# Patient Record
Sex: MALE | Race: WHITE | NOT HISPANIC OR LATINO | Employment: OTHER | URBAN - METROPOLITAN AREA
[De-identification: names, ages, dates, MRNs, and addresses within clinical notes are randomized per-mention and may not be internally consistent; named-entity substitution may affect disease eponyms.]

---

## 2023-10-26 ENCOUNTER — HOSPITAL ENCOUNTER (EMERGENCY)
Facility: HOSPITAL | Age: 83
Discharge: HOME | End: 2023-10-26
Attending: EMERGENCY MEDICINE
Payer: MEDICARE

## 2023-10-26 ENCOUNTER — APPOINTMENT (OUTPATIENT)
Dept: RADIOLOGY | Facility: HOSPITAL | Age: 83
End: 2023-10-26
Payer: MEDICARE

## 2023-10-26 VITALS
SYSTOLIC BLOOD PRESSURE: 148 MMHG | HEIGHT: 62 IN | TEMPERATURE: 98.1 F | OXYGEN SATURATION: 97 % | RESPIRATION RATE: 14 BRPM | BODY MASS INDEX: 24.84 KG/M2 | DIASTOLIC BLOOD PRESSURE: 76 MMHG | WEIGHT: 135 LBS | HEART RATE: 62 BPM

## 2023-10-26 DIAGNOSIS — F10.920 ALCOHOLIC INTOXICATION WITHOUT COMPLICATION (CMS-HCC): ICD-10-CM

## 2023-10-26 DIAGNOSIS — S09.90XA CLOSED HEAD INJURY, INITIAL ENCOUNTER: ICD-10-CM
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
## 2023-10-26 LAB
ALBUMIN SERPL BCP-MCNC: 4 G/DL (ref 3.4–5)
ALP SERPL-CCNC: 57 U/L (ref 33–136)
ALT SERPL W P-5'-P-CCNC: 20 U/L (ref 10–52)
ANION GAP SERPL CALC-SCNC: 13 MMOL/L (ref 10–20)
AST SERPL W P-5'-P-CCNC: 24 U/L (ref 9–39)
BASOPHILS # BLD AUTO: 0.03 X10*3/UL (ref 0–0.1)
BASOPHILS NFR BLD AUTO: 0.6 %
BILIRUB SERPL-MCNC: 0.4 MG/DL (ref 0–1.2)
BUN SERPL-MCNC: 12 MG/DL (ref 6–23)
CALCIUM SERPL-MCNC: 9.5 MG/DL (ref 8.6–10.3)
CHLORIDE SERPL-SCNC: 104 MMOL/L (ref 98–107)
CO2 SERPL-SCNC: 27 MMOL/L (ref 21–32)
CREAT SERPL-MCNC: 1.27 MG/DL (ref 0.5–1.3)
EOSINOPHIL # BLD AUTO: 0.32 X10*3/UL (ref 0–0.4)
EOSINOPHIL NFR BLD AUTO: 6.4 %
ERYTHROCYTE [DISTWIDTH] IN BLOOD BY AUTOMATED COUNT: 13.9 % (ref 11.5–14.5)
ETHANOL SERPL-MCNC: 171 MG/DL
GFR SERPL CREATININE-BSD FRML MDRD: 55 ML/MIN/1.73M*2
GLUCOSE SERPL-MCNC: 85 MG/DL (ref 74–99)
HCT VFR BLD AUTO: 47 % (ref 41–52)
HGB BLD-MCNC: 14.8 G/DL (ref 13.5–17.5)
IMM GRANULOCYTES # BLD AUTO: 0.03 X10*3/UL (ref 0–0.5)
IMM GRANULOCYTES NFR BLD AUTO: 0.6 % (ref 0–0.9)
LYMPHOCYTES # BLD AUTO: 1.48 X10*3/UL (ref 0.8–3)
LYMPHOCYTES NFR BLD AUTO: 29.8 %
MCH RBC QN AUTO: 29.3 PG (ref 26–34)
MCHC RBC AUTO-ENTMCNC: 31.5 G/DL (ref 32–36)
MCV RBC AUTO: 93 FL (ref 80–100)
MONOCYTES # BLD AUTO: 0.42 X10*3/UL (ref 0.05–0.8)
MONOCYTES NFR BLD AUTO: 8.5 %
NEUTROPHILS # BLD AUTO: 2.69 X10*3/UL (ref 1.6–5.5)
NEUTROPHILS NFR BLD AUTO: 54.1 %
NRBC BLD-RTO: 0 /100 WBCS (ref 0–0)
PLATELET # BLD AUTO: 221 X10*3/UL (ref 150–450)
PMV BLD AUTO: 10.6 FL (ref 7.5–11.5)
POTASSIUM SERPL-SCNC: 3.7 MMOL/L (ref 3.5–5.3)
PROT SERPL-MCNC: 7 G/DL (ref 6.4–8.2)
RBC # BLD AUTO: 5.05 X10*6/UL (ref 4.5–5.9)
SODIUM SERPL-SCNC: 140 MMOL/L (ref 136–145)
WBC # BLD AUTO: 5 X10*3/UL (ref 4.4–11.3)

## 2023-10-26 PROCEDURE — 72125 CT NECK SPINE W/O DYE: CPT | Performed by: RADIOLOGY

## 2023-10-26 PROCEDURE — 70450 CT HEAD/BRAIN W/O DYE: CPT | Performed by: RADIOLOGY

## 2023-10-26 PROCEDURE — 71045 X-RAY EXAM CHEST 1 VIEW: CPT | Mod: FOREIGN READ | Performed by: RADIOLOGY

## 2023-10-26 PROCEDURE — 36415 COLL VENOUS BLD VENIPUNCTURE: CPT | Performed by: EMERGENCY MEDICINE

## 2023-10-26 PROCEDURE — 70450 CT HEAD/BRAIN W/O DYE: CPT | Mod: ME

## 2023-10-26 PROCEDURE — 99285 EMERGENCY DEPT VISIT HI MDM: CPT | Mod: 25 | Performed by: EMERGENCY MEDICINE

## 2023-10-26 PROCEDURE — 80053 COMPREHEN METABOLIC PANEL: CPT | Performed by: EMERGENCY MEDICINE

## 2023-10-26 PROCEDURE — 82077 ASSAY SPEC XCP UR&BREATH IA: CPT | Performed by: EMERGENCY MEDICINE

## 2023-10-26 PROCEDURE — 72125 CT NECK SPINE W/O DYE: CPT | Mod: ME

## 2023-10-26 PROCEDURE — 85025 COMPLETE CBC W/AUTO DIFF WBC: CPT | Performed by: EMERGENCY MEDICINE

## 2023-10-26 PROCEDURE — 71045 X-RAY EXAM CHEST 1 VIEW: CPT | Mod: FY

## 2023-10-26 ASSESSMENT — COLUMBIA-SUICIDE SEVERITY RATING SCALE - C-SSRS
2. HAVE YOU ACTUALLY HAD ANY THOUGHTS OF KILLING YOURSELF?: NO
1. IN THE PAST MONTH, HAVE YOU WISHED YOU WERE DEAD OR WISHED YOU COULD GO TO SLEEP AND NOT WAKE UP?: NO
6. HAVE YOU EVER DONE ANYTHING, STARTED TO DO ANYTHING, OR PREPARED TO DO ANYTHING TO END YOUR LIFE?: NO

## 2023-10-26 ASSESSMENT — PAIN SCALES - GENERAL
PAINLEVEL_OUTOF10: 0 - NO PAIN

## 2023-10-26 ASSESSMENT — PAIN - FUNCTIONAL ASSESSMENT: PAIN_FUNCTIONAL_ASSESSMENT: 0-10

## 2023-10-26 NOTE — ED PROVIDER NOTES
"HPI   Chief Complaint   Patient presents with    Fall     Per family, pt \"lost his balance getting pants on\" and fell and hit the back of his head. Denies LOC, no blood thinners. Pt denies any head pain. Family states pt is normally alert and oriented, pt is alert to himself and the location. Pt states he has been drinking today, unknown amount.        83-year-old male who presents by squad for a fall.  Patient states he fell getting up today putting on his pants fell hitting his head.  He is not on any blood thinners.  Patient admits to doing 2 shots of Mauro Beam today.  He denies any other injuries in his neck or back.  Denies any pelvic pain.  Family states that he was hard to arouse initially.  He does live alone.                          No data recorded                Patient History   Past Medical History:   Diagnosis Date    Personal history of other diseases of the respiratory system 09/28/2016    History of acute bronchitis    Personal history of other endocrine, nutritional and metabolic disease 01/13/2017    History of hyperlipidemia    Unspecified asthma with (acute) exacerbation 08/15/2018    Asthma exacerbation    Vitamin D deficiency, unspecified 02/11/2022    Vitamin D deficiency     No past surgical history on file.  No family history on file.  Social History     Tobacco Use    Smoking status: Not on file    Smokeless tobacco: Not on file   Substance Use Topics    Alcohol use: Not on file    Drug use: Not on file       Physical Exam   ED Triage Vitals [10/26/23 1900]   Temp Heart Rate Resp BP   36.7 °C (98.1 °F) 78 15 (!) 179/105      SpO2 Temp Source Heart Rate Source Patient Position   98 % Temporal Monitor --      BP Location FiO2 (%)     -- --       Physical Exam  Constitutional:       Appearance: Normal appearance. He is normal weight.   HENT:      Head: Normocephalic and atraumatic.      Nose: Nose normal.      Mouth/Throat:      Mouth: Mucous membranes are moist.      Pharynx: Oropharynx is " clear.   Eyes:      Extraocular Movements: Extraocular movements intact.      Conjunctiva/sclera: Conjunctivae normal.      Pupils: Pupils are equal, round, and reactive to light.   Cardiovascular:      Rate and Rhythm: Normal rate and regular rhythm.   Pulmonary:      Effort: Pulmonary effort is normal.      Breath sounds: Normal breath sounds.   Abdominal:      General: Abdomen is flat. Bowel sounds are normal.      Palpations: Abdomen is soft.   Musculoskeletal:         General: Normal range of motion.      Cervical back: Normal range of motion and neck supple.   Skin:     General: Skin is warm and dry.      Capillary Refill: Capillary refill takes less than 2 seconds.   Neurological:      General: No focal deficit present.      Mental Status: He is alert.   Psychiatric:         Mood and Affect: Mood normal.         Behavior: Behavior normal.         Thought Content: Thought content normal.         Judgment: Judgment normal.         ED Course & MDM   Diagnoses as of 10/26/23 2043   Fall, initial encounter   Closed head injury, initial encounter   Alcoholic intoxication without complication (CMS/[facility])       Medical Decision Making  Emergency department course, laboratory studies were obtained and reviewed which were unremarkable.  Alcohol level was obtained which was 171.  Chest x-ray shows no acute infiltrate or effusion.  CT of the head and cervical spine will be obtained and is currently pending will be turned over to oncoming physician.        Procedure  Procedures     Raine Leonard DO  10/26/23 2043

## 2023-10-27 NOTE — PROGRESS NOTES
In short this is an 83-year-old individual that was signed out to me pending CT imaging of the head and cervical spine reads.  Up until this point he was worked up by the previous physician obtain basic labs to find that he is actively intoxicated patient does admit to alcohol use.  He is alert and oriented to person time place although is clinically intoxicated.  Plan at time of signout likely discharge home after metabolized alcohol or has safe discharge with family who can watch him.    Patient's family member is now present they have agreed to escort patient home in their car and continue to monitor until patient is sober.  He is recommended alcohol cessation once again provided strict return precautions appropriate follow-up and discharged in stable condition.  He is aware of incidental findings on CT imaging as well as family member.    CT head wo IV contrast   Final Result   1. Advanced brain atrophy and findings suggestive of chronic small   vessel ischemic disease. No evidence of acute cortical infarct or   intracranial hemorrhage.        2. Extensive postoperative changes and diffuse inflammatory changes   of the paranasal sinuses as above. No air-fluid levels.        MACRO:   None        Signed by: Yeny Justice 10/26/2023 9:18 PM   Dictation workstation:   [id number]      CT cervical spine wo IV contrast   Final Result   1. No evidence for an acute fracture or subluxation of the cervical   spine.        MACRO:   None        Signed by: Yeny Justice 10/26/2023 9:21 PM   Dictation workstation:   [id number]      XR chest 1 view   Final Result   No acute pulmonary pathology.   Signed by Familia Kwan MD

## 2023-10-27 NOTE — DISCHARGE INSTRUCTIONS
You are diagnosed with intoxication recommend alcohol cessation no evidence of fracture dislocation or bleeding of the brain.  Please follow-up with your primary physician as you may have suffered a concussion.  Should you been experiencing confusion new worsening pain symptoms concerning to call 911 or return immediately.

## 2024-02-22 ENCOUNTER — HOSPITAL ENCOUNTER (EMERGENCY)
Facility: HOSPITAL | Age: 84
Discharge: HOME | End: 2024-02-22
Attending: EMERGENCY MEDICINE
Payer: MEDICARE

## 2024-02-22 ENCOUNTER — APPOINTMENT (OUTPATIENT)
Dept: RADIOLOGY | Facility: HOSPITAL | Age: 84
End: 2024-02-22
Payer: MEDICARE

## 2024-02-22 VITALS
SYSTOLIC BLOOD PRESSURE: 168 MMHG | HEART RATE: 74 BPM | HEIGHT: 64 IN | WEIGHT: 135 LBS | OXYGEN SATURATION: 98 % | RESPIRATION RATE: 16 BRPM | DIASTOLIC BLOOD PRESSURE: 80 MMHG | BODY MASS INDEX: 23.05 KG/M2 | TEMPERATURE: 97.2 F

## 2024-02-22 DIAGNOSIS — R41.0 CONFUSION: Primary | ICD-10-CM

## 2024-02-22 LAB
ALBUMIN SERPL BCP-MCNC: 3.8 G/DL (ref 3.4–5)
ALP SERPL-CCNC: 53 U/L (ref 33–136)
ALT SERPL W P-5'-P-CCNC: 6 U/L (ref 10–52)
ANION GAP SERPL CALC-SCNC: 14 MMOL/L (ref 10–20)
APPEARANCE UR: CLEAR
AST SERPL W P-5'-P-CCNC: 11 U/L (ref 9–39)
BASOPHILS # BLD AUTO: 0.04 X10*3/UL (ref 0–0.1)
BASOPHILS NFR BLD AUTO: 0.5 %
BILIRUB SERPL-MCNC: 0.6 MG/DL (ref 0–1.2)
BILIRUB UR STRIP.AUTO-MCNC: NEGATIVE MG/DL
BUN SERPL-MCNC: 28 MG/DL (ref 6–23)
CALCIUM SERPL-MCNC: 9.5 MG/DL (ref 8.6–10.3)
CHLORIDE SERPL-SCNC: 104 MMOL/L (ref 98–107)
CO2 SERPL-SCNC: 25 MMOL/L (ref 21–32)
COLOR UR: YELLOW
CREAT SERPL-MCNC: 1.39 MG/DL (ref 0.5–1.3)
EGFRCR SERPLBLD CKD-EPI 2021: 50 ML/MIN/1.73M*2
EOSINOPHIL # BLD AUTO: 0.3 X10*3/UL (ref 0–0.4)
EOSINOPHIL NFR BLD AUTO: 3.6 %
ERYTHROCYTE [DISTWIDTH] IN BLOOD BY AUTOMATED COUNT: 13.3 % (ref 11.5–14.5)
ETHANOL SERPL-MCNC: <10 MG/DL
GLUCOSE SERPL-MCNC: 94 MG/DL (ref 74–99)
GLUCOSE UR STRIP.AUTO-MCNC: NEGATIVE MG/DL
HCT VFR BLD AUTO: 42.1 % (ref 41–52)
HGB BLD-MCNC: 13.7 G/DL (ref 13.5–17.5)
HOLD SPECIMEN: NORMAL
HYALINE CASTS #/AREA URNS AUTO: ABNORMAL /LPF
IMM GRANULOCYTES # BLD AUTO: 0.04 X10*3/UL (ref 0–0.5)
IMM GRANULOCYTES NFR BLD AUTO: 0.5 % (ref 0–0.9)
KETONES UR STRIP.AUTO-MCNC: NEGATIVE MG/DL
LEUKOCYTE ESTERASE UR QL STRIP.AUTO: NEGATIVE
LYMPHOCYTES # BLD AUTO: 1.96 X10*3/UL (ref 0.8–3)
LYMPHOCYTES NFR BLD AUTO: 23.4 %
MCH RBC QN AUTO: 30.4 PG (ref 26–34)
MCHC RBC AUTO-ENTMCNC: 32.5 G/DL (ref 32–36)
MCV RBC AUTO: 93 FL (ref 80–100)
MONOCYTES # BLD AUTO: 0.86 X10*3/UL (ref 0.05–0.8)
MONOCYTES NFR BLD AUTO: 10.3 %
NEUTROPHILS # BLD AUTO: 5.17 X10*3/UL (ref 1.6–5.5)
NEUTROPHILS NFR BLD AUTO: 61.7 %
NITRITE UR QL STRIP.AUTO: NEGATIVE
NRBC BLD-RTO: 0 /100 WBCS (ref 0–0)
PH UR STRIP.AUTO: 5 [PH]
PLATELET # BLD AUTO: 256 X10*3/UL (ref 150–450)
POTASSIUM SERPL-SCNC: 3.8 MMOL/L (ref 3.5–5.3)
PROT SERPL-MCNC: 6.9 G/DL (ref 6.4–8.2)
PROT UR STRIP.AUTO-MCNC: ABNORMAL MG/DL
RBC # BLD AUTO: 4.51 X10*6/UL (ref 4.5–5.9)
RBC # UR STRIP.AUTO: NEGATIVE /UL
RBC #/AREA URNS AUTO: ABNORMAL /HPF
SODIUM SERPL-SCNC: 139 MMOL/L (ref 136–145)
SP GR UR STRIP.AUTO: 1.01
UROBILINOGEN UR STRIP.AUTO-MCNC: <2 MG/DL
WBC # BLD AUTO: 8.4 X10*3/UL (ref 4.4–11.3)
WBC #/AREA URNS AUTO: ABNORMAL /HPF

## 2024-02-22 PROCEDURE — 99284 EMERGENCY DEPT VISIT MOD MDM: CPT | Mod: 25

## 2024-02-22 PROCEDURE — 82077 ASSAY SPEC XCP UR&BREATH IA: CPT | Performed by: EMERGENCY MEDICINE

## 2024-02-22 PROCEDURE — 70450 CT HEAD/BRAIN W/O DYE: CPT | Performed by: RADIOLOGY

## 2024-02-22 PROCEDURE — 71045 X-RAY EXAM CHEST 1 VIEW: CPT

## 2024-02-22 PROCEDURE — 80053 COMPREHEN METABOLIC PANEL: CPT | Performed by: EMERGENCY MEDICINE

## 2024-02-22 PROCEDURE — 71045 X-RAY EXAM CHEST 1 VIEW: CPT | Mod: FOREIGN READ | Performed by: RADIOLOGY

## 2024-02-22 PROCEDURE — 36415 COLL VENOUS BLD VENIPUNCTURE: CPT | Performed by: EMERGENCY MEDICINE

## 2024-02-22 PROCEDURE — 81001 URINALYSIS AUTO W/SCOPE: CPT | Performed by: EMERGENCY MEDICINE

## 2024-02-22 PROCEDURE — 70450 CT HEAD/BRAIN W/O DYE: CPT

## 2024-02-22 PROCEDURE — 85025 COMPLETE CBC W/AUTO DIFF WBC: CPT | Performed by: EMERGENCY MEDICINE

## 2024-02-22 ASSESSMENT — LIFESTYLE VARIABLES
HAVE YOU EVER FELT YOU SHOULD CUT DOWN ON YOUR DRINKING: NO
EVER HAD A DRINK FIRST THING IN THE MORNING TO STEADY YOUR NERVES TO GET RID OF A HANGOVER: NO
HAVE PEOPLE ANNOYED YOU BY CRITICIZING YOUR DRINKING: NO
EVER FELT BAD OR GUILTY ABOUT YOUR DRINKING: NO
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
## 2024-03-13 ENCOUNTER — NURSING HOME VISIT (OUTPATIENT)
Dept: POST ACUTE CARE | Facility: EXTERNAL LOCATION | Age: 84
End: 2024-03-13
Payer: MEDICARE

## 2024-03-13 DIAGNOSIS — I10 HYPERTENSION, ESSENTIAL: Primary | ICD-10-CM

## 2024-03-13 DIAGNOSIS — K21.9 GASTROESOPHAGEAL REFLUX DISEASE WITHOUT ESOPHAGITIS: ICD-10-CM

## 2024-03-13 DIAGNOSIS — F10.27 MILD DEMENTIA ASSOCIATED WITH ALCOHOLISM, WITHOUT BEHAVIORAL DISTURBANCE, PSYCHOTIC DISTURBANCE, MOOD DISTURBANCE, OR ANXIETY (MULTI): ICD-10-CM

## 2024-03-13 PROCEDURE — 99349 HOME/RES VST EST MOD MDM 40: CPT

## 2024-03-13 NOTE — LETTER
Patient: Rasheed Antonio  : 1940    Encounter Date: 2024    PROGRESS NOTE    Subjective  Chief complaint: Rasheed Antonio is a 84 y.o. male who is an assisted living facility patient being seen and evaluated for  general medical care and monthly follow-up    HPI:  Patient seen and evaluated for general medical care and monthly follow-up.  Patient at baseline mentation, cooperative, pleasant.  Offers no complaints at time.  Engages with other residents and meals and activities.  BP slightly elevated at time of assessment.  Offers no complaints of epigastric pain or acidic taste in mouth.  Appetite good.  Sleeping well.  No concerns per nursing      Objective  Vital signs:  148/84-99.0-72-18-99%    Physical Exam  Constitutional:       Appearance: Normal appearance.   HENT:      Head: Normocephalic.      Mouth/Throat:      Mouth: Mucous membranes are moist.   Eyes:      Extraocular Movements: Extraocular movements intact.   Cardiovascular:      Rate and Rhythm: Normal rate and regular rhythm.      Pulses: Normal pulses.      Heart sounds: Normal heart sounds.   Pulmonary:      Effort: Pulmonary effort is normal.      Breath sounds: Normal breath sounds.   Abdominal:      General: Bowel sounds are normal.      Palpations: Abdomen is soft.   Musculoskeletal:         General: Normal range of motion.      Cervical back: Normal range of motion and neck supple.   Skin:     General: Skin is warm and dry.      Capillary Refill: Capillary refill takes less than 2 seconds.   Neurological:      Mental Status: He is alert. Mental status is at baseline.   Psychiatric:         Mood and Affect: Mood normal.         Behavior: Behavior normal.         Assessment/Plan  Problem List Items Addressed This Visit       Hypertension, essential - Primary      BP elevated at time of assessment   trend for medication adjustment   HCTZ, losartan   routine BMP   monitor         Mild dementia associated with alcoholism, without behavioral  disturbance, psychotic disturbance, mood disturbance, or anxiety (CMS/HCC)      Stable   baseline mentation   no outbursts or agitation noted per nursing   Maintain safety in MCU   monitor         Gastroesophageal reflux disease without esophagitis      Stable   no complaints of epigastric pain or acidic taste in mouth   Encouraged to stay sitting up after eating   monitor          Medications, treatments, and labs reviewed  Continue medications and treatments as listed in EMR    JUDE Solano      Electronically Signed By: JUDE Solano   3/20/24  9:03 PM

## 2024-03-20 PROBLEM — K21.9 GASTROESOPHAGEAL REFLUX DISEASE WITHOUT ESOPHAGITIS: Status: ACTIVE | Noted: 2024-03-20

## 2024-03-20 PROBLEM — N18.30 CKD (CHRONIC KIDNEY DISEASE) STAGE 3, GFR 30-59 ML/MIN (MULTI): Status: ACTIVE | Noted: 2024-03-20

## 2024-03-20 PROBLEM — F10.27: Status: ACTIVE | Noted: 2024-03-20

## 2024-03-20 PROBLEM — H40.9 GLAUCOMA: Status: ACTIVE | Noted: 2024-03-20

## 2024-03-20 PROBLEM — I10 HYPERTENSION, ESSENTIAL: Status: ACTIVE | Noted: 2024-03-20

## 2024-03-21 NOTE — ASSESSMENT & PLAN NOTE
Stable   no complaints of epigastric pain or acidic taste in mouth   Encouraged to stay sitting up after eating   monitor

## 2024-03-21 NOTE — PROGRESS NOTES
PROGRESS NOTE    Subjective   Chief complaint: Rasheed Antonio is a 84 y.o. male who is an assisted living facility patient being seen and evaluated for  general medical care and monthly follow-up    HPI:  Patient seen and evaluated for general medical care and monthly follow-up.  Patient at baseline mentation, cooperative, pleasant.  Offers no complaints at time.  Engages with other residents and meals and activities.  BP slightly elevated at time of assessment.  Offers no complaints of epigastric pain or acidic taste in mouth.  Appetite good.  Sleeping well.  No concerns per nursing      Objective   Vital signs:  148/84-99.0-72-18-99%    Physical Exam  Constitutional:       Appearance: Normal appearance.   HENT:      Head: Normocephalic.      Mouth/Throat:      Mouth: Mucous membranes are moist.   Eyes:      Extraocular Movements: Extraocular movements intact.   Cardiovascular:      Rate and Rhythm: Normal rate and regular rhythm.      Pulses: Normal pulses.      Heart sounds: Normal heart sounds.   Pulmonary:      Effort: Pulmonary effort is normal.      Breath sounds: Normal breath sounds.   Abdominal:      General: Bowel sounds are normal.      Palpations: Abdomen is soft.   Musculoskeletal:         General: Normal range of motion.      Cervical back: Normal range of motion and neck supple.   Skin:     General: Skin is warm and dry.      Capillary Refill: Capillary refill takes less than 2 seconds.   Neurological:      Mental Status: He is alert. Mental status is at baseline.   Psychiatric:         Mood and Affect: Mood normal.         Behavior: Behavior normal.         Assessment/Plan   Problem List Items Addressed This Visit       Hypertension, essential - Primary      BP elevated at time of assessment   trend for medication adjustment   HCTZ, losartan   routine BMP   monitor         Mild dementia associated with alcoholism, without behavioral disturbance, psychotic disturbance, mood disturbance, or anxiety  (CMS/[facility])      Stable   baseline mentation   no outbursts or agitation noted per nursing   Maintain safety in MCU   monitor         Gastroesophageal reflux disease without esophagitis      Stable   no complaints of epigastric pain or acidic taste in mouth   Encouraged to stay sitting up after eating   monitor          Medications, treatments, and labs reviewed  Continue medications and treatments as listed in EMR    Lu Gayle, APRN-CNP

## 2024-03-21 NOTE — ASSESSMENT & PLAN NOTE
Stable   baseline mentation   no outbursts or agitation noted per nursing   Maintain safety in MCU   monitor

## 2024-03-21 NOTE — ASSESSMENT & PLAN NOTE
BP elevated at time of assessment   trend for medication adjustment   HCTZ, losartan   routine BMP   monitor

## 2024-03-28 DIAGNOSIS — E55.9 VITAMIN D DEFICIENCY: Primary | ICD-10-CM

## 2024-03-29 RX ORDER — ASPIRIN 325 MG
TABLET, DELAYED RELEASE (ENTERIC COATED) ORAL
Qty: 10 CAPSULE | Refills: 0 | Status: SHIPPED | OUTPATIENT
Start: 2024-03-29

## 2024-04-26 ENCOUNTER — NURSING HOME VISIT (OUTPATIENT)
Dept: POST ACUTE CARE | Facility: EXTERNAL LOCATION | Age: 84
End: 2024-04-26
Payer: MEDICARE

## 2024-04-26 DIAGNOSIS — F10.27 MILD DEMENTIA ASSOCIATED WITH ALCOHOLISM, WITHOUT BEHAVIORAL DISTURBANCE, PSYCHOTIC DISTURBANCE, MOOD DISTURBANCE, OR ANXIETY (MULTI): ICD-10-CM

## 2024-04-26 DIAGNOSIS — K21.9 GASTROESOPHAGEAL REFLUX DISEASE WITHOUT ESOPHAGITIS: Primary | ICD-10-CM

## 2024-04-26 DIAGNOSIS — I10 HYPERTENSION, ESSENTIAL: ICD-10-CM

## 2024-04-26 PROCEDURE — 99349 HOME/RES VST EST MOD MDM 40: CPT

## 2024-04-26 NOTE — LETTER
Patient: Rasheed Antonio  : 1940    Encounter Date: 2024    PROGRESS NOTE    Subjective  Chief complaint: Rasheed Antonio is a 84 y.o. male who is an assisted living facility patient being seen and evaluated for general medical care and monthly follow-up    HPI:  Patient seen and evaluated for general medical care and monthly follow-up.  Patient at baseline mentation, cooperative, pleasant.  Offers no complaints at time.  Engages with other residents and meals and activities. GERD stable- no epigastric pain or acidic taste in mouth.  Appetite good.  Sleeping well.  No concerns per nursing      Objective      Physical Exam  Constitutional:       Appearance: Normal appearance.   HENT:      Head: Normocephalic.      Mouth/Throat:      Mouth: Mucous membranes are moist.   Eyes:      Extraocular Movements: Extraocular movements intact.   Cardiovascular:      Rate and Rhythm: Normal rate and regular rhythm.      Pulses: Normal pulses.      Heart sounds: Normal heart sounds.   Pulmonary:      Effort: Pulmonary effort is normal.      Breath sounds: Normal breath sounds.   Abdominal:      General: Bowel sounds are normal.      Palpations: Abdomen is soft.   Musculoskeletal:         General: Normal range of motion.      Cervical back: Normal range of motion and neck supple.      Comments:  generalized weakness   Skin:     General: Skin is warm and dry.      Capillary Refill: Capillary refill takes less than 2 seconds.   Neurological:      Mental Status: He is alert. Mental status is at baseline.   Psychiatric:         Mood and Affect: Mood normal.         Assessment/Plan  Problem List Items Addressed This Visit       Hypertension, essential      BP elevated at time of assessment   trend for medication adjustment   HCTZ, losartan   routine BMP   monitor         Mild dementia associated with alcoholism, without behavioral disturbance, psychotic disturbance, mood disturbance, or anxiety (Multi)      Stable   baseline  mentation   no outbursts or agitation noted per nursing   Maintain safety in MCU   monitor         Gastroesophageal reflux disease without esophagitis - Primary      Stable   no complaints of epigastric pain or acidic taste in mouth   Encouraged to stay sitting up after eating   monitor          Medications, treatments, and labs reviewed  Continue medications and treatments as listed in EMR    JUDE Solano      Electronically Signed By: JUDE Solano   4/27/24 10:34 PM

## 2024-04-28 NOTE — PROGRESS NOTES
PROGRESS NOTE    Subjective   Chief complaint: Rasheed Antonio is a 84 y.o. male who is an assisted living facility patient being seen and evaluated for general medical care and monthly follow-up    HPI:  Patient seen and evaluated for general medical care and monthly follow-up.  Patient at baseline mentation, cooperative, pleasant.  Offers no complaints at time.  Engages with other residents and meals and activities. GERD stable- no epigastric pain or acidic taste in mouth.  Appetite good.  Sleeping well.  No concerns per nursing      Objective       Physical Exam  Constitutional:       Appearance: Normal appearance.   HENT:      Head: Normocephalic.      Mouth/Throat:      Mouth: Mucous membranes are moist.   Eyes:      Extraocular Movements: Extraocular movements intact.   Cardiovascular:      Rate and Rhythm: Normal rate and regular rhythm.      Pulses: Normal pulses.      Heart sounds: Normal heart sounds.   Pulmonary:      Effort: Pulmonary effort is normal.      Breath sounds: Normal breath sounds.   Abdominal:      General: Bowel sounds are normal.      Palpations: Abdomen is soft.   Musculoskeletal:         General: Normal range of motion.      Cervical back: Normal range of motion and neck supple.      Comments:  generalized weakness   Skin:     General: Skin is warm and dry.      Capillary Refill: Capillary refill takes less than 2 seconds.   Neurological:      Mental Status: He is alert. Mental status is at baseline.   Psychiatric:         Mood and Affect: Mood normal.         Assessment/Plan   Problem List Items Addressed This Visit       Hypertension, essential      BP elevated at time of assessment   trend for medication adjustment   HCTZ, losartan   routine BMP   monitor         Mild dementia associated with alcoholism, without behavioral disturbance, psychotic disturbance, mood disturbance, or anxiety (Multi)      Stable   baseline mentation   no outbursts or agitation noted per nursing   Maintain  safety in MCU   monitor         Gastroesophageal reflux disease without esophagitis - Primary      Stable   no complaints of epigastric pain or acidic taste in mouth   Encouraged to stay sitting up after eating   monitor          Medications, treatments, and labs reviewed  Continue medications and treatments as listed in EMR    Lu Gayle, APRN-CNP

## 2024-05-01 ENCOUNTER — NURSING HOME VISIT (OUTPATIENT)
Dept: POST ACUTE CARE | Facility: EXTERNAL LOCATION | Age: 84
End: 2024-05-01
Payer: MEDICARE

## 2024-05-01 DIAGNOSIS — K21.9 GASTROESOPHAGEAL REFLUX DISEASE WITHOUT ESOPHAGITIS: ICD-10-CM

## 2024-05-01 DIAGNOSIS — F10.27 MILD DEMENTIA ASSOCIATED WITH ALCOHOLISM, WITHOUT BEHAVIORAL DISTURBANCE, PSYCHOTIC DISTURBANCE, MOOD DISTURBANCE, OR ANXIETY (MULTI): Primary | ICD-10-CM

## 2024-05-01 DIAGNOSIS — F41.9 ANXIETY: ICD-10-CM

## 2024-05-01 DIAGNOSIS — I10 HYPERTENSION, ESSENTIAL: ICD-10-CM

## 2024-05-01 PROCEDURE — 99349 HOME/RES VST EST MOD MDM 40: CPT

## 2024-05-01 NOTE — LETTER
Patient: Rasheed Antonio  : 1940    Encounter Date: 2024    PROGRESS NOTE    Subjective  Chief complaint: Rasheed Antonio is a 84 y.o. male who is an assisted living facility patient being seen and evaluated for general medical care and monthly follow-up    HPI:  Patient seen and evaluated for general medical care and monthly follow-up.  Patient at baseline mentation, cooperative, pleasant.  Offers no complaints at time.  Engages with other residents and meals and activities. Offers no complaints at time.  Appetite good.  Sleeping well.  Patient is scheduled to transition into room outside U this week. No concerns per nursing      Objective  Vital signs:  132/74-98.3-82-20-93%    Physical Exam  Constitutional:       Appearance: Normal appearance.   HENT:      Head: Normocephalic.      Mouth/Throat:      Mouth: Mucous membranes are moist.   Eyes:      Extraocular Movements: Extraocular movements intact.   Cardiovascular:      Rate and Rhythm: Normal rate and regular rhythm.      Pulses: Normal pulses.      Heart sounds: Normal heart sounds.   Pulmonary:      Effort: Pulmonary effort is normal.      Breath sounds: Normal breath sounds.   Abdominal:      General: Bowel sounds are normal.      Palpations: Abdomen is soft.   Musculoskeletal:         General: Normal range of motion.      Cervical back: Normal range of motion and neck supple.      Comments: Generalized weakness   Skin:     General: Skin is warm and dry.      Capillary Refill: Capillary refill takes less than 2 seconds.   Neurological:      Mental Status: He is alert. Mental status is at baseline.   Psychiatric:         Mood and Affect: Mood normal.         Behavior: Behavior normal.         Assessment/Plan  Problem List Items Addressed This Visit       Hypertension, essential      continue HCTZ, losartan   routine BMP   monitor         Mild dementia associated with alcoholism, without behavioral disturbance, psychotic disturbance, mood  disturbance, or anxiety (Multi) - Primary      Stable   baseline mentation   no outbursts or agitation noted per nursing   Maintain safety in MCU   monitor         Gastroesophageal reflux disease without esophagitis      Stable   no complaints of epigastric pain or acidic taste in mouth   Encouraged to stay sitting up after eating   monitor         Anxiety     No outbursts or agitation per nursing  Monitor for behaviors          Medications, treatments, and labs reviewed  Continue medications and treatments as listed in EMR    JUDE Solano      Electronically Signed By: JUDE Solano   5/27/24 11:22 AM

## 2024-05-17 ENCOUNTER — NURSING HOME VISIT (OUTPATIENT)
Dept: POST ACUTE CARE | Facility: EXTERNAL LOCATION | Age: 84
End: 2024-05-17
Payer: MEDICARE

## 2024-05-17 DIAGNOSIS — I10 HYPERTENSION, ESSENTIAL: ICD-10-CM

## 2024-05-17 DIAGNOSIS — F10.27 MILD DEMENTIA ASSOCIATED WITH ALCOHOLISM, WITHOUT BEHAVIORAL DISTURBANCE, PSYCHOTIC DISTURBANCE, MOOD DISTURBANCE, OR ANXIETY (MULTI): ICD-10-CM

## 2024-05-17 DIAGNOSIS — F41.9 ANXIETY: Primary | ICD-10-CM

## 2024-05-17 DIAGNOSIS — R41.0 CONFUSION: ICD-10-CM

## 2024-05-17 PROCEDURE — 99349 HOME/RES VST EST MOD MDM 40: CPT

## 2024-05-27 PROBLEM — F41.9 ANXIETY: Status: ACTIVE | Noted: 2024-05-27

## 2024-05-27 PROBLEM — R41.0 CONFUSION: Status: ACTIVE | Noted: 2024-05-27

## 2024-05-27 NOTE — PROGRESS NOTES
PROGRESS NOTE    Subjective   Chief complaint: Rasheed Antonio is a 84 y.o. male who is an assisted living facility patient being seen and evaluated for general medical care and monthly follow-up    HPI:  Patient seen and evaluated for general medical care and monthly follow-up.  Patient at baseline mentation, cooperative, pleasant.  Offers no complaints at time.  Engages with other residents and meals and activities. Offers no complaints at time.  Appetite good.  Sleeping well.  Patient is scheduled to transition into room outside [facility] this week. No concerns per nursing      Objective   Vital signs:  132/74-98.3-82-20-93%    Physical Exam  Constitutional:       Appearance: Normal appearance.   HENT:      Head: Normocephalic.      Mouth/Throat:      Mouth: Mucous membranes are moist.   Eyes:      Extraocular Movements: Extraocular movements intact.   Cardiovascular:      Rate and Rhythm: Normal rate and regular rhythm.      Pulses: Normal pulses.      Heart sounds: Normal heart sounds.   Pulmonary:      Effort: Pulmonary effort is normal.      Breath sounds: Normal breath sounds.   Abdominal:      General: Bowel sounds are normal.      Palpations: Abdomen is soft.   Musculoskeletal:         General: Normal range of motion.      Cervical back: Normal range of motion and neck supple.      Comments: Generalized weakness   Skin:     General: Skin is warm and dry.      Capillary Refill: Capillary refill takes less than 2 seconds.   Neurological:      Mental Status: He is alert. Mental status is at baseline.   Psychiatric:         Mood and Affect: Mood normal.         Behavior: Behavior normal.         Assessment/Plan   Problem List Items Addressed This Visit       Hypertension, essential      continue HCTZ, losartan   routine BMP   monitor         Mild dementia associated with alcoholism, without behavioral disturbance, psychotic disturbance, mood disturbance, or anxiety (Multi) - Primary      Stable   baseline mentation    no outbursts or agitation noted per nursing   Maintain safety in [facility]   monitor         Gastroesophageal reflux disease without esophagitis      Stable   no complaints of epigastric pain or acidic taste in mouth   Encouraged to stay sitting up after eating   monitor         Anxiety     No outbursts or agitation per nursing  Monitor for behaviors          Medications, treatments, and labs reviewed  Continue medications and treatments as listed in EMR    Lu Gayle, APRN-CNP

## 2024-05-27 NOTE — ASSESSMENT & PLAN NOTE
Stable   baseline mentation   no outbursts or agitation noted per nursing   recent transition from [facility]   monitor

## 2024-05-27 NOTE — PROGRESS NOTES
PROGRESS NOTE    Subjective   Chief complaint: Rasheed Antonio is a 84 y.o. male who is an assisted living facility patient being seen and evaluated for  confusion, behaviors    HPI:  Requested to see patient due to agitation, behaviors, confusion.  Patient recently transitioned to second floor from [facility].  Per report, patient has been refusing medications and refusing to sign forms for therapy stating no need for therapy.  Anxiousness noted, possibly related to recent transition.  Nursing reports patient had episode of chest pressure but refused ED evaluation on 5/11.  Patient offers no complaints of chest pain, SOB.  Endorses anxiety.  No changes in appetite or sleeping pattern.  Offers no complaints of F/C/N/V/D, cough.  Patient noted to be at baseline mentation, cooperative.  No other concerns per nursing      Objective   Vital signs:  132/74-98.3-82-20-93%    Physical Exam  Constitutional:       Appearance: Normal appearance.   HENT:      Head: Normocephalic.      Mouth/Throat:      Mouth: Mucous membranes are moist.   Eyes:      Extraocular Movements: Extraocular movements intact.   Cardiovascular:      Rate and Rhythm: Normal rate and regular rhythm.      Pulses: Normal pulses.      Heart sounds: Normal heart sounds.   Pulmonary:      Effort: Pulmonary effort is normal.      Breath sounds: Normal breath sounds.   Abdominal:      General: Bowel sounds are normal.      Palpations: Abdomen is soft.   Musculoskeletal:         General: Normal range of motion.      Cervical back: Normal range of motion and neck supple.      Comments:  generalized weakness   Skin:     General: Skin is warm and dry.      Capillary Refill: Capillary refill takes less than 2 seconds.   Neurological:      Mental Status: He is alert. Mental status is at baseline.   Psychiatric:         Mood and Affect: Mood normal.         Behavior: Behavior normal.         Assessment/Plan   Problem List Items Addressed This Visit       Hypertension,  essential      continue HCTZ, losartan   routine BMP   monitor         Mild dementia associated with alcoholism, without behavioral disturbance, psychotic disturbance, mood disturbance, or anxiety (Multi)      Stable   baseline mentation   no outbursts or agitation noted per nursing   recent transition from [facility]   monitor         Anxiety - Primary      recent transition from [facility]   Endorses anxiousness   experienced episode of chest pain 5/11 - refused ED evaluation   Seroquel         Confusion      confusion worse since transition from [facility]   [facility] C&S          Medications, treatments, and labs reviewed  Continue medications and treatments as listed in EMR    Lu Gayle, APRN-CNP

## 2024-05-27 NOTE — ASSESSMENT & PLAN NOTE
recent transition from [facility]   Endorses anxiousness   experienced episode of chest pain 5/11 - refused ED evaluation   Seroquel

## 2024-06-26 ENCOUNTER — NURSING HOME VISIT (OUTPATIENT)
Dept: POST ACUTE CARE | Facility: EXTERNAL LOCATION | Age: 84
End: 2024-06-26
Payer: MEDICARE

## 2024-06-26 DIAGNOSIS — I10 HYPERTENSION, ESSENTIAL: ICD-10-CM

## 2024-06-26 DIAGNOSIS — F10.27 MILD DEMENTIA ASSOCIATED WITH ALCOHOLISM, WITHOUT BEHAVIORAL DISTURBANCE, PSYCHOTIC DISTURBANCE, MOOD DISTURBANCE, OR ANXIETY (MULTI): ICD-10-CM

## 2024-06-26 DIAGNOSIS — K21.9 GASTROESOPHAGEAL REFLUX DISEASE WITHOUT ESOPHAGITIS: ICD-10-CM

## 2024-06-26 DIAGNOSIS — F41.9 ANXIETY: Primary | ICD-10-CM

## 2024-06-26 PROCEDURE — 99348 HOME/RES VST EST LOW MDM 30: CPT

## 2024-06-26 NOTE — LETTER
Patient: Rasheed Antonio  : 1940    Encounter Date: 2024    PROGRESS NOTE    Subjective  Chief complaint: Rasheed Antonio is a 84 y.o. male who is an assisted living facility patient being seen and evaluated for general medical care and monthly follow-up    HPI:  Patient seen and evaluated for general medical care and monthly follow-up.  Patient at baseline mentation, cooperative, pleasant.  Offers no complaints at time.  Engages with other residents and meals and activities. GERD stable- no epigastric pain or acidic taste in mouth.  Appetite good.  Sleeping well.  No concerns per nursing      Objective  Vital signs 132/74-98.3-82-20-93%      Physical Exam  Constitutional:       Appearance: Normal appearance.   HENT:      Head: Normocephalic.      Mouth/Throat:      Mouth: Mucous membranes are moist.   Eyes:      Extraocular Movements: Extraocular movements intact.   Cardiovascular:      Rate and Rhythm: Normal rate and regular rhythm.      Pulses: Normal pulses.      Heart sounds: Normal heart sounds.   Pulmonary:      Effort: Pulmonary effort is normal.      Breath sounds: Normal breath sounds.   Abdominal:      General: Bowel sounds are normal.      Palpations: Abdomen is soft.   Musculoskeletal:         General: Normal range of motion.      Cervical back: Normal range of motion and neck supple.   Skin:     General: Skin is warm and dry.      Capillary Refill: Capillary refill takes less than 2 seconds.   Neurological:      Mental Status: He is alert. Mental status is at baseline.   Psychiatric:         Mood and Affect: Mood normal.         Behavior: Behavior normal.         Assessment/Plan  Problem List Items Addressed This Visit       Hypertension, essential      continue HCTZ, losartan   routine BMP   monitor         Mild dementia associated with alcoholism, without behavioral disturbance, psychotic disturbance, mood disturbance, or anxiety (Multi)      Stable   baseline mentation   no outbursts or  agitation noted per nursing   monitor         Gastroesophageal reflux disease without esophagitis      Stable   no complaints of epigastric pain or acidic taste in mouth   Encouraged to stay sitting up after eating   monitor         Anxiety - Primary       No outbursts or agitation per nursing    Seroquel          Medications, treatments, and labs reviewed  Continue medications and treatments as listed in EMR    JUDE Solano      Electronically Signed By: JUDE Solano   6/30/24  8:10 PM

## 2024-07-01 NOTE — PROGRESS NOTES
PROGRESS NOTE    Subjective   Chief complaint: Rasheed Antonio is a 84 y.o. male who is an assisted living facility patient being seen and evaluated for general medical care and monthly follow-up    HPI:  Patient seen and evaluated for general medical care and monthly follow-up.  Patient at baseline mentation, cooperative, pleasant.  Offers no complaints at time.  Engages with other residents and meals and activities. GERD stable- no epigastric pain or acidic taste in mouth.  Appetite good.  Sleeping well.  No concerns per nursing      Objective   Vital signs 132/74-98.3-82-20-93%      Physical Exam  Constitutional:       Appearance: Normal appearance.   HENT:      Head: Normocephalic.      Mouth/Throat:      Mouth: Mucous membranes are moist.   Eyes:      Extraocular Movements: Extraocular movements intact.   Cardiovascular:      Rate and Rhythm: Normal rate and regular rhythm.      Pulses: Normal pulses.      Heart sounds: Normal heart sounds.   Pulmonary:      Effort: Pulmonary effort is normal.      Breath sounds: Normal breath sounds.   Abdominal:      General: Bowel sounds are normal.      Palpations: Abdomen is soft.   Musculoskeletal:         General: Normal range of motion.      Cervical back: Normal range of motion and neck supple.   Skin:     General: Skin is warm and dry.      Capillary Refill: Capillary refill takes less than 2 seconds.   Neurological:      Mental Status: He is alert. Mental status is at baseline.   Psychiatric:         Mood and Affect: Mood normal.         Behavior: Behavior normal.         Assessment/Plan   Problem List Items Addressed This Visit       Hypertension, essential      continue HCTZ, losartan   routine BMP   monitor         Mild dementia associated with alcoholism, without behavioral disturbance, psychotic disturbance, mood disturbance, or anxiety (Multi)      Stable   baseline mentation   no outbursts or agitation noted per nursing   monitor         Gastroesophageal reflux  disease without esophagitis      Stable   no complaints of epigastric pain or acidic taste in mouth   Encouraged to stay sitting up after eating   monitor         Anxiety - Primary       No outbursts or agitation per nursing    Seroquel          Medications, treatments, and labs reviewed  Continue medications and treatments as listed in EMR    Lu Gayle, APRN-CNP

## 2024-07-24 ENCOUNTER — NURSING HOME VISIT (OUTPATIENT)
Dept: POST ACUTE CARE | Facility: EXTERNAL LOCATION | Age: 84
End: 2024-07-24
Payer: MEDICARE

## 2024-07-24 DIAGNOSIS — N18.30 STAGE 3 CHRONIC KIDNEY DISEASE, UNSPECIFIED WHETHER STAGE 3A OR 3B CKD (MULTI): ICD-10-CM

## 2024-07-24 DIAGNOSIS — F41.9 ANXIETY: Primary | ICD-10-CM

## 2024-07-24 DIAGNOSIS — K21.9 GASTROESOPHAGEAL REFLUX DISEASE WITHOUT ESOPHAGITIS: ICD-10-CM

## 2024-07-24 DIAGNOSIS — I10 HYPERTENSION, ESSENTIAL: ICD-10-CM

## 2024-07-24 DIAGNOSIS — F10.27 MILD DEMENTIA ASSOCIATED WITH ALCOHOLISM, WITHOUT BEHAVIORAL DISTURBANCE, PSYCHOTIC DISTURBANCE, MOOD DISTURBANCE, OR ANXIETY (MULTI): ICD-10-CM

## 2024-07-24 NOTE — LETTER
Patient: Rasheed Antonio  : 1940    Encounter Date: 2024    PROGRESS NOTE    Subjective  Chief complaint: Rasheed Antonio is a 84 y.o. male who is an assisted living facility patient being seen and evaluated for general medical care and monthly follow-up    HPI:  Patient seen and evaluated for general medical care and monthly follow-up.  Patient at baseline mentation, cooperative, pleasant.  Offers no complaints at time.  Engages with other residents and meals and activities. Offers no complaints at time.  Appetite good.  Sleeping well. No concerns per nursing      Objective  Vital signs:  130/74 - 98.5-70-18-98%    Physical Exam  Constitutional:       Appearance: Normal appearance.   HENT:      Head: Normocephalic.      Mouth/Throat:      Mouth: Mucous membranes are moist.   Eyes:      Extraocular Movements: Extraocular movements intact.   Cardiovascular:      Rate and Rhythm: Normal rate and regular rhythm.      Pulses: Normal pulses.      Heart sounds: Normal heart sounds.   Pulmonary:      Effort: Pulmonary effort is normal.      Breath sounds: Normal breath sounds.   Abdominal:      General: Bowel sounds are normal.      Palpations: Abdomen is soft.   Musculoskeletal:         General: Normal range of motion.      Cervical back: Normal range of motion and neck supple.   Skin:     General: Skin is warm and dry.      Capillary Refill: Capillary refill takes less than 2 seconds.   Neurological:      Mental Status: He is alert. Mental status is at baseline.   Psychiatric:         Mood and Affect: Mood normal.         Behavior: Behavior normal.         Assessment/Plan  Problem List Items Addressed This Visit       Hypertension, essential      continue HCTZ, losartan   routine BMP   monitor         Mild dementia associated with alcoholism, without behavioral disturbance, psychotic disturbance, mood disturbance, or anxiety (Multi)      Stable   baseline mentation   no outbursts or agitation noted per  nursing   monitor         Gastroesophageal reflux disease without esophagitis      Stable   no complaints of epigastric pain or acidic taste in mouth   Encouraged to stay sitting up after eating   monitor         CKD (chronic kidney disease) stage 3, GFR 30-59 ml/min (Multi)      Avoid nephrotoxins   monitor labs         Anxiety - Primary       No outbursts or agitation per nursing    Seroquel          Medications, treatments, and labs reviewed  Continue medications and treatments as listed in EMR    JUDE Solano      Electronically Signed By: JUDE Solano   7/27/24 12:27 PM

## 2024-07-27 NOTE — PROGRESS NOTES
PROGRESS NOTE    Subjective   Chief complaint: Rasheed Antonio is a 84 y.o. male who is an assisted living facility patient being seen and evaluated for general medical care and monthly follow-up    HPI:  Patient seen and evaluated for general medical care and monthly follow-up.  Patient at baseline mentation, cooperative, pleasant.  Offers no complaints at time.  Engages with other residents and meals and activities. Offers no complaints at time.  Appetite good.  Sleeping well. No concerns per nursing      Objective   Vital signs:  130/74 - 98.5-70-18-98%    Physical Exam  Constitutional:       Appearance: Normal appearance.   HENT:      Head: Normocephalic.      Mouth/Throat:      Mouth: Mucous membranes are moist.   Eyes:      Extraocular Movements: Extraocular movements intact.   Cardiovascular:      Rate and Rhythm: Normal rate and regular rhythm.      Pulses: Normal pulses.      Heart sounds: Normal heart sounds.   Pulmonary:      Effort: Pulmonary effort is normal.      Breath sounds: Normal breath sounds.   Abdominal:      General: Bowel sounds are normal.      Palpations: Abdomen is soft.   Musculoskeletal:         General: Normal range of motion.      Cervical back: Normal range of motion and neck supple.   Skin:     General: Skin is warm and dry.      Capillary Refill: Capillary refill takes less than 2 seconds.   Neurological:      Mental Status: He is alert. Mental status is at baseline.   Psychiatric:         Mood and Affect: Mood normal.         Behavior: Behavior normal.         Assessment/Plan   Problem List Items Addressed This Visit       Hypertension, essential      continue HCTZ, losartan   routine BMP   monitor         Mild dementia associated with alcoholism, without behavioral disturbance, psychotic disturbance, mood disturbance, or anxiety (Multi)      Stable   baseline mentation   no outbursts or agitation noted per nursing   monitor         Gastroesophageal reflux disease without  esophagitis      Stable   no complaints of epigastric pain or acidic taste in mouth   Encouraged to stay sitting up after eating   monitor         CKD (chronic kidney disease) stage 3, GFR 30-59 ml/min (Multi)      Avoid nephrotoxins   monitor labs         Anxiety - Primary       No outbursts or agitation per nursing    Seroquel          Medications, treatments, and labs reviewed  Continue medications and treatments as listed in EMR    Lu Gayle, APRN-CNP

## 2024-08-02 ENCOUNTER — NURSING HOME VISIT (OUTPATIENT)
Dept: POST ACUTE CARE | Facility: EXTERNAL LOCATION | Age: 84
End: 2024-08-02
Payer: MEDICARE

## 2024-08-02 DIAGNOSIS — F10.27 MILD DEMENTIA ASSOCIATED WITH ALCOHOLISM, WITHOUT BEHAVIORAL DISTURBANCE, PSYCHOTIC DISTURBANCE, MOOD DISTURBANCE, OR ANXIETY (MULTI): ICD-10-CM

## 2024-08-02 DIAGNOSIS — F41.9 ANXIETY: ICD-10-CM

## 2024-08-02 DIAGNOSIS — I10 HYPERTENSION, ESSENTIAL: ICD-10-CM

## 2024-08-02 DIAGNOSIS — N18.30 STAGE 3 CHRONIC KIDNEY DISEASE, UNSPECIFIED WHETHER STAGE 3A OR 3B CKD (MULTI): Primary | ICD-10-CM

## 2024-08-02 PROCEDURE — 99348 HOME/RES VST EST LOW MDM 30: CPT

## 2024-08-02 NOTE — LETTER
Patient: Rasheed Antonio  : 1940    Encounter Date: 2024    PROGRESS NOTE    Subjective  Chief complaint: Rasheed Antonio is a 84 y.o. male who is an assisted living facility patient being seen and evaluated for general medical care and monthly follow    HPI:  Patient seen and evaluated for general medical care and monthly follow-up.  Patient at baseline mentation, cooperative and pleasant.  No outbursts or agitation noted per nursing.  BP stable.  Patient denies F/C/N/V/D, SOB, cough.  No concerns per nursing.          Objective  Vital signs: 130/74 - 98.5-70-18-98%    Physical Exam  Constitutional:       Appearance: Normal appearance.   HENT:      Head: Normocephalic.      Mouth/Throat:      Mouth: Mucous membranes are moist.   Eyes:      Extraocular Movements: Extraocular movements intact.   Cardiovascular:      Rate and Rhythm: Normal rate and regular rhythm.      Pulses: Normal pulses.      Heart sounds: Normal heart sounds.   Pulmonary:      Effort: Pulmonary effort is normal.      Breath sounds: Normal breath sounds.   Abdominal:      General: Bowel sounds are normal.      Palpations: Abdomen is soft.   Musculoskeletal:         General: Normal range of motion.      Cervical back: Normal range of motion and neck supple.   Skin:     General: Skin is warm and dry.      Capillary Refill: Capillary refill takes less than 2 seconds.   Neurological:      Mental Status: He is alert. Mental status is at baseline.   Psychiatric:         Mood and Affect: Mood normal.         Behavior: Behavior normal.         Assessment/Plan  Problem List Items Addressed This Visit       Hypertension, essential      continue HCTZ, losartan   routine BMP   monitor         Mild dementia associated with alcoholism, without behavioral disturbance, psychotic disturbance, mood disturbance, or anxiety (Multi)      Stable   baseline mentation   no outbursts or agitation noted per nursing   monitor         CKD (chronic kidney disease)  stage 3, GFR 30-59 ml/min (Multi) - Primary      Avoid nephrotoxins   monitor labs         Anxiety       No outbursts or agitation per nursing    Seroquel          Medications, treatments, and labs reviewed  Continue medications and treatments as listed in EMR    Scribe Attestation  IIvan Scribe   attest that this documentation has been prepared under the direction and in the presence of JUDE Solano    Provider Attestation - Scribe documentation  All medical record entries made by the Scribe were at my direction and personally dictated by me. I have reviewed the chart and agree that the record accurately reflects my personal performance of the history, physical exam, discussion and plan.   JUDE Solano      Electronically Signed By: JUDE Solano   8/8/24  1:42 AM

## 2024-08-05 NOTE — PROGRESS NOTES
PROGRESS NOTE    Subjective   Chief complaint: Rasheed Antonio is a 84 y.o. male who is an assisted living facility patient being seen and evaluated for general medical care and monthly follow    HPI:  Patient seen and evaluated for general medical care and monthly follow-up.  Patient at baseline mentation, cooperative and pleasant.  No outbursts or agitation noted per nursing.  BP stable.  Patient denies F/C/N/V/D, SOB, cough.  No concerns per nursing.          Objective   Vital signs: 130/74 - 98.5-70-18-98%    Physical Exam  Constitutional:       Appearance: Normal appearance.   HENT:      Head: Normocephalic.      Mouth/Throat:      Mouth: Mucous membranes are moist.   Eyes:      Extraocular Movements: Extraocular movements intact.   Cardiovascular:      Rate and Rhythm: Normal rate and regular rhythm.      Pulses: Normal pulses.      Heart sounds: Normal heart sounds.   Pulmonary:      Effort: Pulmonary effort is normal.      Breath sounds: Normal breath sounds.   Abdominal:      General: Bowel sounds are normal.      Palpations: Abdomen is soft.   Musculoskeletal:         General: Normal range of motion.      Cervical back: Normal range of motion and neck supple.   Skin:     General: Skin is warm and dry.      Capillary Refill: Capillary refill takes less than 2 seconds.   Neurological:      Mental Status: He is alert. Mental status is at baseline.   Psychiatric:         Mood and Affect: Mood normal.         Behavior: Behavior normal.         Assessment/Plan   Problem List Items Addressed This Visit       Hypertension, essential      continue HCTZ, losartan   routine BMP   monitor         Mild dementia associated with alcoholism, without behavioral disturbance, psychotic disturbance, mood disturbance, or anxiety (Multi)      Stable   baseline mentation   no outbursts or agitation noted per nursing   monitor         CKD (chronic kidney disease) stage 3, GFR 30-59 ml/min (Multi) - Primary      Avoid  nephrotoxins   monitor labs         Anxiety       No outbursts or agitation per nursing    Seroquel          Medications, treatments, and labs reviewed  Continue medications and treatments as listed in EMR    Scribe Attestation  I, Mac You   attest that this documentation has been prepared under the direction and in the presence of JUDE Solano    Provider Attestation - Scribe documentation  All medical record entries made by the Scribe were at my direction and personally dictated by me. I have reviewed the chart and agree that the record accurately reflects my personal performance of the history, physical exam, discussion and plan.   JUDE Solano

## 2024-08-05 NOTE — ASSESSMENT & PLAN NOTE
SUBJECTIVE:   Devendra Pastrana is a 8 year old male, here for a routine health maintenance visit,   accompanied by his mother and sister.    Patient was roomed by: Nirmala Duran MA    Do you have any forms to be completed?  no    SOCIAL HISTORY  Child lives with: mother, father, sister and maternal grandparents   Who takes care of your child: school  Language(s) spoken at home: English  Recent family changes/social stressors: none noted    SAFETY/HEALTH RISK  Is your child around anyone who smokes:  No  TB exposure:  No  Does your child always wear a seat belt?  Yes  Helmet worn for bicycle/roller blades/skateboard?  Yes  Home Safety Survey:    Guns/firearms in the home: YES, Trigger locks present? YES, Ammunition separate from firearm: YES  Is your child ever at home alone:  No- if yes very short periods   Do you monitor your child's screen use?  Yes  Cardiac risk assessment:     Family history (males <55, females <65) of angina (chest pain), heart attack, heart surgery for clogged arteries, or stroke: YES, Maternal Great grandmother stroke     Biological parent(s) with a total cholesterol over 240:  no    DENTAL  Dental health HIGH risk factors: none  Water source:  city water    No sports physical needed.    DAILY ACTIVITIES  DIET AND EXERCISE  Does your child get at least 4 helpings of a fruit or vegetable every day: Yes  What does your child drink besides milk and water (and how much?): Juice on occasion   Does your child get at least 60 minutes per day of active play, including time in and out of school: Yes  TV in child's bedroom: No    Dairy/ calcium: 2% milk, yogurt and cheese    SLEEP:  No concerns, sleeps well through night    ELIMINATION  Normal bowel movements and Normal urination    MEDIA  >2 hours/ day    ACTIVITIES:  Age appropriate activities  After school clubs     VISION   No corrective lenses (H Plus Lens Screening required)  Tool used: Cabral  Right eye: 10/12.5 (20/25)  Left eye: 10/12.5    No outbursts or agitation per nursing    Seroquel   (20/25)  Two Line Difference: No  Visual Acuity: Pass  H Plus Lens Screening: Pass    Vision Assessment: normal      HEARING  Right Ear:      1000 Hz RESPONSE- on Level: 40 db (Conditioning sound)   1000 Hz: RESPONSE- on Level:   20 db    2000 Hz: RESPONSE- on Level:   20 db    4000 Hz: RESPONSE- on Level:   20 db     Left Ear:      4000 Hz: RESPONSE- on Level:   20 db    2000 Hz: RESPONSE- on Level:   20 db    1000 Hz: RESPONSE- on Level:   20 db     500 Hz: RESPONSE- on Level: 25 db    Right Ear:    500 Hz: RESPONSE- on Level: 25 db    Hearing Acuity: Pass    Hearing Assessment: normal    QUESTIONS/CONCERNS: None     ==================    MENTAL HEALTH  Screening:  Pediatric Symptom Checklist PASS (<28 pass), no followup necessary  No concerns    EDUCATION  Concerns: no  School: [employer]   Grade: 4 th     PROBLEM LIST  Patient Active Problem List   Diagnosis     Molluscum contagiosum     History of penicillin allergy     Moderate persistent asthma without complication     Chronic nasal congestion     MEDICATIONS  Current Outpatient Prescriptions   Medication Sig Dispense Refill     cetirizine (ZYRTEC) 5 MG CHEW Take 1-2 tablets (5-10 mg) by mouth daily       albuterol (PROAIR HFA/PROVENTIL HFA/VENTOLIN HFA) 108 (90 BASE) MCG/ACT Inhaler Inhale 2 puffs into the lungs every 4 hours as needed for shortness of breath / dyspnea or wheezing (Patient not taking: Reported on 5/8/2018) 3 Inhaler 1     azelastine (ASTELIN) 0.1 % spray USE 1 SPRAY IN EACH NOSTRIL TWICE A DAY  2     budesonide (PULMICORT) 0.5 MG/2ML neb solution Use 1 vial in neb bid daily-start at onset of cough/bark and wean as better (Patient not taking: Reported on 5/8/2018) 30 ampule 11     budesonide (PULMICORT) 0.5 MG/2ML nebulizer solution Start right away 2x day  with onset of any cough/bark and wean as better. (Patient not taking: Reported on 4/19/2017) 1 Box 3     fluticasone (FLONASE) 50 MCG/ACT spray Spray 1 spray into both nostrils daily (Patient  "not taking: Reported on 5/8/2018) 16 g 0     olopatadine (PATANOL) 0.1 % ophthalmic solution Place 1 drop into both eyes 2 times daily (Patient not taking: Reported on 11/7/2017) 5 mL 3     Pediatric Multiple Vit-C-FA (CHILDRENS CHEWABLE MULTI VITS) CHEW Take  by mouth. Once a day       sodium fluoride (LURIDE) 2.2 (1 F) MG chewable tablet Take 1 tablet (2.2 mg) by mouth daily (Patient not taking: Reported on 9/26/2018) 90 tablet 3     VENTOLIN  (90 BASE) MCG/ACT inhaler Reported on 4/19/2017  0      ALLERGY  Allergies   Allergen Reactions     Amoxicillin Hives       IMMUNIZATIONS  Immunization History   Administered Date(s) Administered     DTAP-IPV, <7Y 10/01/2012     DTAP-IPV/HIB (PENTACEL) 12/31/2009     DTaP / Hep B / IPV 2008, 01/26/2009, 03/30/2009     HEPA 09/29/2009, 03/31/2010     HepB 2008     Hib (PRP-T) 2008, 01/26/2009, 03/30/2009     Influenza (H1N1) 11/11/2009, 12/14/2009     Influenza (IIV3) PF 11/11/2009, 12/14/2009, 09/24/2010, 10/01/2012     Influenza Intranasal Vaccine 10/03/2011     Influenza Intranasal Vaccine 4 valent 10/01/2013, 11/24/2014     Influenza Vaccine IM 3yrs+ 4 Valent IIV4 10/19/2016, 11/07/2017     MMR 09/29/2009, 10/01/2012     Pneumo Conj 13-V (2010&after) 09/24/2010     Pneumococcal (PCV 7) 2008, 01/26/2009, 03/30/2009, 12/31/2009     Rotavirus, pentavalent 2008, 01/26/2009, 03/30/2009     Varicella 09/29/2009, 10/01/2012       HEALTH HISTORY SINCE LAST VISIT  No surgery, major illness or injury since last physical exam    ROS  Constitutional, eye, ENT, skin, respiratory, cardiac, and GI are normal except as otherwise noted.    OBJECTIVE:   EXAM  /67 (BP Location: Right arm, Patient Position: Sitting, Cuff Size: Adult Small)  Pulse 90  Temp 98.1  F (36.7  C) (Tympanic)  Ht 4' 8.25\" (1.429 m)  Wt 86 lb 8 oz (39.2 kg)  BMI 19.22 kg/m2  74 %ile based on CDC 2-20 Years stature-for-age data using vitals from 9/26/2018.  85 %ile based " on CDC 2-20 Years weight-for-age data using vitals from 9/26/2018.  84 %ile based on CDC 2-20 Years BMI-for-age data using vitals from 9/26/2018.  Blood pressure percentiles are 69.9 % systolic and 66.2 % diastolic based on the August 2017 AAP Clinical Practice Guideline.  GENERAL: Active, alert, in no acute distress.  SKIN: Clear. No significant rash, abnormal pigmentation or lesions  HEAD: Normocephalic  EYES: Pupils equal, round, reactive, Extraocular muscles intact. Normal conjunctivae.  EARS: Normal canals. Tympanic membranes are normal; gray and translucent.  NOSE: Normal without discharge.  MOUTH/THROAT: Clear. No oral lesions. Teeth without obvious abnormalities.  NECK: Supple, no masses.  No thyromegaly.  LYMPH NODES: No adenopathy  LUNGS: Clear. No rales, rhonchi, wheezing or retractions  HEART: Regular rhythm. Normal S1/S2. No murmurs. Normal pulses.  ABDOMEN: Soft, non-tender, not distended, no masses or hepatosplenomegaly. Bowel sounds normal.   NEUROLOGIC: No focal findings. Cranial nerves grossly intact: DTR's normal. Normal gait, strength and tone  BACK: Spine is straight, no scoliosis.  EXTREMITIES: Full range of motion, no deformities  -M: Normal male external genitalia. Chidi stage 1,  both testes descended, no hernia.      ASSESSMENT/PLAN:   1. Encounter for routine child health examination w/o abnormal findings  Doing excellent.  - PURE TONE HEARING TEST, AIR  - SCREENING, VISUAL ACUITY, QUANTITATIVE, BILAT  - BEHAVIORAL / EMOTIONAL ASSESSMENT [[id number]]    2. Moderate persistent asthma without complication  Doing amazing    3. Environmental allergies  -followed by ENT/allergy-doing well on allergy drops.      Anticipatory Guidance  The following topics were discussed:  SOCIAL/ FAMILY:    Praise for positive activities    Encourage reading    Limit / supervise TV/ media    Limits and consequences    Friends  NUTRITION:    Healthy snacks    Family meals    Balanced diet  HEALTH/ SAFETY:     Physical activity    Regular dental care    Sleep issues    Booster seat/ Seat belts    Sunscreen/ insect repellent    Bike/sport helmets    Preventive Care Plan  Immunizations    See orders in EpicCare.  I reviewed the signs and symptoms of adverse effects and when to seek medical care if they should arise.  Referrals/Ongoing Specialty care: No   See other orders in EpicCare.  Cleared for sports:  Not addressed  BMI at 84 %ile based on CDC 2-20 Years BMI-for-age data using vitals from 9/26/2018.  No weight concerns.  Dyslipidemia risk:    None  Dental visit recommended: Yes  Dental varnish declined by parent    FOLLOW-UP:    in 1 year for a Preventive Care visit    Resources  HPV and Cancer Prevention:  What Parents Should Know  What Kids Should Know About HPV and Cancer  Goal Tracker: Be More Active  Goal Tracker: Less Screen Time  Goal Tracker: Drink More Water  Goal Tracker: Eat More Fruits and Veggies  [place] Child and Teen Checkups (C&TC) Schedule of Age-Related Screening Standards    Dulce Peralta MD, MD  [facility]

## 2024-09-12 ENCOUNTER — APPOINTMENT (OUTPATIENT)
Dept: RADIOLOGY | Facility: HOSPITAL | Age: 84
DRG: 369 | End: 2024-09-12
Payer: MEDICARE

## 2024-09-12 ENCOUNTER — HOSPITAL ENCOUNTER (INPATIENT)
Facility: HOSPITAL | Age: 84
DRG: 811 | End: 2024-09-12
Attending: STUDENT IN AN ORGANIZED HEALTH CARE EDUCATION/TRAINING PROGRAM | Admitting: INTERNAL MEDICINE
Payer: MEDICARE

## 2024-09-12 DIAGNOSIS — R53.1 WEAKNESS: ICD-10-CM

## 2024-09-12 DIAGNOSIS — K21.9 GASTROESOPHAGEAL REFLUX DISEASE WITHOUT ESOPHAGITIS: ICD-10-CM

## 2024-09-12 DIAGNOSIS — F10.27 MILD DEMENTIA ASSOCIATED WITH ALCOHOLISM, WITHOUT BEHAVIORAL DISTURBANCE, PSYCHOTIC DISTURBANCE, MOOD DISTURBANCE, OR ANXIETY (MULTI): ICD-10-CM

## 2024-09-12 DIAGNOSIS — N18.30 STAGE 3 CHRONIC KIDNEY DISEASE, UNSPECIFIED WHETHER STAGE 3A OR 3B CKD (MULTI): ICD-10-CM

## 2024-09-12 DIAGNOSIS — R10.84 GENERALIZED ABDOMINAL PAIN: ICD-10-CM

## 2024-09-12 DIAGNOSIS — D64.9 ANEMIA, UNSPECIFIED TYPE: ICD-10-CM

## 2024-09-12 DIAGNOSIS — R19.5 DARK STOOLS: Primary | ICD-10-CM

## 2024-09-12 DIAGNOSIS — I10 HYPERTENSION, ESSENTIAL: ICD-10-CM

## 2024-09-12 LAB
ABO GROUP (TYPE) IN BLOOD: NORMAL
ABO GROUP (TYPE) IN BLOOD: NORMAL
ALBUMIN SERPL BCP-MCNC: 3.8 G/DL (ref 3.4–5)
ALP SERPL-CCNC: 47 U/L (ref 33–136)
ALT SERPL W P-5'-P-CCNC: 6 U/L (ref 10–52)
ANION GAP SERPL CALC-SCNC: 13 MMOL/L (ref 10–20)
ANTIBODY SCREEN: NORMAL
APTT PPP: 34 SECONDS (ref 27–38)
AST SERPL W P-5'-P-CCNC: 10 U/L (ref 9–39)
BASOPHILS # BLD AUTO: 0.04 X10*3/UL (ref 0–0.1)
BASOPHILS NFR BLD AUTO: 0.4 %
BILIRUB SERPL-MCNC: 0.3 MG/DL (ref 0–1.2)
BUN SERPL-MCNC: 57 MG/DL (ref 6–23)
CALCIUM SERPL-MCNC: 9.3 MG/DL (ref 8.6–10.3)
CHLORIDE SERPL-SCNC: 109 MMOL/L (ref 98–107)
CO2 SERPL-SCNC: 22 MMOL/L (ref 21–32)
CREAT SERPL-MCNC: 2.22 MG/DL (ref 0.5–1.3)
EGFRCR SERPLBLD CKD-EPI 2021: 28 ML/MIN/1.73M*2
EOSINOPHIL # BLD AUTO: 0.72 X10*3/UL (ref 0–0.4)
EOSINOPHIL NFR BLD AUTO: 7.1 %
ERYTHROCYTE [DISTWIDTH] IN BLOOD BY AUTOMATED COUNT: 13 % (ref 11.5–14.5)
GLUCOSE SERPL-MCNC: 103 MG/DL (ref 74–99)
HCT VFR BLD AUTO: 23.5 % (ref 41–52)
HGB BLD-MCNC: 7.3 G/DL (ref 13.5–17.5)
IMM GRANULOCYTES # BLD AUTO: 0.07 X10*3/UL (ref 0–0.5)
IMM GRANULOCYTES NFR BLD AUTO: 0.7 % (ref 0–0.9)
INR PPP: 1 (ref 0.9–1.1)
LYMPHOCYTES # BLD AUTO: 1.79 X10*3/UL (ref 0.8–3)
LYMPHOCYTES NFR BLD AUTO: 17.5 %
MCH RBC QN AUTO: 28.2 PG (ref 26–34)
MCHC RBC AUTO-ENTMCNC: 31.1 G/DL (ref 32–36)
MCV RBC AUTO: 91 FL (ref 80–100)
MONOCYTES # BLD AUTO: 1.01 X10*3/UL (ref 0.05–0.8)
MONOCYTES NFR BLD AUTO: 9.9 %
NEUTROPHILS # BLD AUTO: 6.57 X10*3/UL (ref 1.6–5.5)
NEUTROPHILS NFR BLD AUTO: 64.4 %
NRBC BLD-RTO: 0 /100 WBCS (ref 0–0)
PLATELET # BLD AUTO: 297 X10*3/UL (ref 150–450)
POTASSIUM SERPL-SCNC: 3.8 MMOL/L (ref 3.5–5.3)
PROT SERPL-MCNC: 7 G/DL (ref 6.4–8.2)
PROTHROMBIN TIME: 11.5 SECONDS (ref 9.8–12.8)
RBC # BLD AUTO: 2.59 X10*6/UL (ref 4.5–5.9)
RH FACTOR (ANTIGEN D): NORMAL
RH FACTOR (ANTIGEN D): NORMAL
SODIUM SERPL-SCNC: 140 MMOL/L (ref 136–145)
WBC # BLD AUTO: 10.2 X10*3/UL (ref 4.4–11.3)

## 2024-09-12 PROCEDURE — 86920 COMPATIBILITY TEST SPIN: CPT

## 2024-09-12 PROCEDURE — 86901 BLOOD TYPING SEROLOGIC RH(D): CPT | Performed by: NURSE PRACTITIONER

## 2024-09-12 PROCEDURE — 85610 PROTHROMBIN TIME: CPT | Performed by: NURSE PRACTITIONER

## 2024-09-12 PROCEDURE — G0378 HOSPITAL OBSERVATION PER HR: HCPCS

## 2024-09-12 PROCEDURE — 2500000004 HC RX 250 GENERAL PHARMACY W/ HCPCS (ALT 636 FOR OP/ED): Performed by: NURSE PRACTITIONER

## 2024-09-12 PROCEDURE — 80053 COMPREHEN METABOLIC PANEL: CPT | Performed by: NURSE PRACTITIONER

## 2024-09-12 PROCEDURE — 36415 COLL VENOUS BLD VENIPUNCTURE: CPT | Performed by: NURSE PRACTITIONER

## 2024-09-12 PROCEDURE — 71045 X-RAY EXAM CHEST 1 VIEW: CPT

## 2024-09-12 PROCEDURE — 96374 THER/PROPH/DIAG INJ IV PUSH: CPT

## 2024-09-12 PROCEDURE — 2500000004 HC RX 250 GENERAL PHARMACY W/ HCPCS (ALT 636 FOR OP/ED)

## 2024-09-12 PROCEDURE — 36415 COLL VENOUS BLD VENIPUNCTURE: CPT | Performed by: STUDENT IN AN ORGANIZED HEALTH CARE EDUCATION/TRAINING PROGRAM

## 2024-09-12 PROCEDURE — 99285 EMERGENCY DEPT VISIT HI MDM: CPT | Mod: 25

## 2024-09-12 PROCEDURE — 71045 X-RAY EXAM CHEST 1 VIEW: CPT | Performed by: RADIOLOGY

## 2024-09-12 PROCEDURE — 85025 COMPLETE CBC W/AUTO DIFF WBC: CPT | Performed by: NURSE PRACTITIONER

## 2024-09-12 RX ORDER — POLYETHYLENE GLYCOL 3350 17 G/17G
17 POWDER, FOR SOLUTION ORAL DAILY
Status: DISCONTINUED | OUTPATIENT
Start: 2024-09-13 | End: 2024-09-17 | Stop reason: HOSPADM

## 2024-09-12 RX ORDER — ACETAMINOPHEN 325 MG/1
650 TABLET ORAL EVERY 4 HOURS PRN
Status: DISCONTINUED | OUTPATIENT
Start: 2024-09-12 | End: 2024-09-17 | Stop reason: HOSPADM

## 2024-09-12 RX ORDER — SODIUM CHLORIDE, SODIUM LACTATE, POTASSIUM CHLORIDE, CALCIUM CHLORIDE 600; 310; 30; 20 MG/100ML; MG/100ML; MG/100ML; MG/100ML
75 INJECTION, SOLUTION INTRAVENOUS CONTINUOUS
Status: DISCONTINUED | OUTPATIENT
Start: 2024-09-12 | End: 2024-09-17 | Stop reason: HOSPADM

## 2024-09-12 RX ORDER — PANTOPRAZOLE SODIUM 40 MG/10ML
40 INJECTION, POWDER, LYOPHILIZED, FOR SOLUTION INTRAVENOUS DAILY
Status: DISCONTINUED | OUTPATIENT
Start: 2024-09-12 | End: 2024-09-13

## 2024-09-12 ASSESSMENT — PAIN SCALES - GENERAL
PAINLEVEL_OUTOF10: 0 - NO PAIN
PAINLEVEL_OUTOF10: 0 - NO PAIN

## 2024-09-12 ASSESSMENT — COLUMBIA-SUICIDE SEVERITY RATING SCALE - C-SSRS
6. HAVE YOU EVER DONE ANYTHING, STARTED TO DO ANYTHING, OR PREPARED TO DO ANYTHING TO END YOUR LIFE?: NO
2. HAVE YOU ACTUALLY HAD ANY THOUGHTS OF KILLING YOURSELF?: NO
1. IN THE PAST MONTH, HAVE YOU WISHED YOU WERE DEAD OR WISHED YOU COULD GO TO SLEEP AND NOT WAKE UP?: NO

## 2024-09-12 ASSESSMENT — PAIN - FUNCTIONAL ASSESSMENT: PAIN_FUNCTIONAL_ASSESSMENT: 0-10

## 2024-09-12 NOTE — ED TRIAGE NOTES
[facility].4 HGB. PT. DENIES NOTICING BLOOD IN STOOL, URINE. DENIES WEAKNESS, DIZZINESS, SOB.

## 2024-09-13 ENCOUNTER — APPOINTMENT (OUTPATIENT)
Dept: GASTROENTEROLOGY | Facility: HOSPITAL | Age: 84
DRG: 369 | End: 2024-09-13
Payer: MEDICARE

## 2024-09-13 ENCOUNTER — ANESTHESIA EVENT (OUTPATIENT)
Dept: GASTROENTEROLOGY | Facility: HOSPITAL | Age: 84
End: 2024-09-13
Payer: MEDICARE

## 2024-09-13 ENCOUNTER — ANESTHESIA (OUTPATIENT)
Dept: GASTROENTEROLOGY | Facility: HOSPITAL | Age: 84
End: 2024-09-13
Payer: MEDICARE

## 2024-09-13 DIAGNOSIS — K20.90 ESOPHAGITIS: Primary | ICD-10-CM

## 2024-09-13 LAB
ANION GAP SERPL CALC-SCNC: 10 MMOL/L (ref 10–20)
APPEARANCE UR: CLEAR
BILIRUB UR STRIP.AUTO-MCNC: NEGATIVE MG/DL
BLOOD EXPIRATION DATE: NORMAL
BUN SERPL-MCNC: 56 MG/DL (ref 6–23)
CALCIUM SERPL-MCNC: 8.8 MG/DL (ref 8.6–10.3)
CHLORIDE SERPL-SCNC: 113 MMOL/L (ref 98–107)
CO2 SERPL-SCNC: 22 MMOL/L (ref 21–32)
COLOR UR: NORMAL
CREAT SERPL-MCNC: 2.15 MG/DL (ref 0.5–1.3)
DISPENSE STATUS: NORMAL
EGFRCR SERPLBLD CKD-EPI 2021: 29 ML/MIN/1.73M*2
ERYTHROCYTE [DISTWIDTH] IN BLOOD BY AUTOMATED COUNT: 12.8 % (ref 11.5–14.5)
GLUCOSE SERPL-MCNC: 92 MG/DL (ref 74–99)
GLUCOSE UR STRIP.AUTO-MCNC: NORMAL MG/DL
HCT VFR BLD AUTO: 20.3 % (ref 41–52)
HCT VFR BLD AUTO: 25.4 % (ref 41–52)
HCT VFR BLD AUTO: 25.7 % (ref 41–52)
HGB BLD-MCNC: 6.1 G/DL (ref 13.5–17.5)
HGB BLD-MCNC: 8 G/DL (ref 13.5–17.5)
HGB BLD-MCNC: 8.1 G/DL (ref 13.5–17.5)
KETONES UR STRIP.AUTO-MCNC: NEGATIVE MG/DL
LEUKOCYTE ESTERASE UR QL STRIP.AUTO: NEGATIVE
MCH RBC QN AUTO: 27.2 PG (ref 26–34)
MCHC RBC AUTO-ENTMCNC: 30 G/DL (ref 32–36)
MCV RBC AUTO: 91 FL (ref 80–100)
NITRITE UR QL STRIP.AUTO: NEGATIVE
NRBC BLD-RTO: 0 /100 WBCS (ref 0–0)
PH UR STRIP.AUTO: 5 [PH]
PLATELET # BLD AUTO: 272 X10*3/UL (ref 150–450)
POTASSIUM SERPL-SCNC: 3.6 MMOL/L (ref 3.5–5.3)
PRODUCT BLOOD TYPE: 600
PRODUCT CODE: NORMAL
PROT UR STRIP.AUTO-MCNC: NEGATIVE MG/DL
RBC # BLD AUTO: 2.24 X10*6/UL (ref 4.5–5.9)
RBC # UR STRIP.AUTO: NEGATIVE /UL
SODIUM SERPL-SCNC: 141 MMOL/L (ref 136–145)
SP GR UR STRIP.AUTO: 1.01
UNIT ABO: NORMAL
UNIT NUMBER: NORMAL
UNIT RH: NORMAL
UNIT VOLUME: 292
UROBILINOGEN UR STRIP.AUTO-MCNC: NORMAL MG/DL
WBC # BLD AUTO: 9 X10*3/UL (ref 4.4–11.3)
XM INTEP: NORMAL

## 2024-09-13 PROCEDURE — 2500000004 HC RX 250 GENERAL PHARMACY W/ HCPCS (ALT 636 FOR OP/ED): Performed by: NURSE PRACTITIONER

## 2024-09-13 PROCEDURE — 97161 PT EVAL LOW COMPLEX 20 MIN: CPT | Mod: GP

## 2024-09-13 PROCEDURE — 2500000002 HC RX 250 W HCPCS SELF ADMINISTERED DRUGS (ALT 637 FOR MEDICARE OP, ALT 636 FOR OP/ED)

## 2024-09-13 PROCEDURE — 80048 BASIC METABOLIC PNL TOTAL CA: CPT

## 2024-09-13 PROCEDURE — 97165 OT EVAL LOW COMPLEX 30 MIN: CPT | Mod: GO

## 2024-09-13 PROCEDURE — 7100000001 HC RECOVERY ROOM TIME - INITIAL BASE CHARGE

## 2024-09-13 PROCEDURE — 7100000002 HC RECOVERY ROOM TIME - EACH INCREMENTAL 1 MINUTE

## 2024-09-13 PROCEDURE — G0378 HOSPITAL OBSERVATION PER HR: HCPCS

## 2024-09-13 PROCEDURE — 0DJ08ZZ INSPECTION OF UPPER INTESTINAL TRACT, VIA NATURAL OR ARTIFICIAL OPENING ENDOSCOPIC: ICD-10-PCS | Performed by: INTERNAL MEDICINE

## 2024-09-13 PROCEDURE — 99223 1ST HOSP IP/OBS HIGH 75: CPT | Performed by: INTERNAL MEDICINE

## 2024-09-13 PROCEDURE — 96361 HYDRATE IV INFUSION ADD-ON: CPT

## 2024-09-13 PROCEDURE — 2500000004 HC RX 250 GENERAL PHARMACY W/ HCPCS (ALT 636 FOR OP/ED): Performed by: PHYSICIAN ASSISTANT

## 2024-09-13 PROCEDURE — 2500000001 HC RX 250 WO HCPCS SELF ADMINISTERED DRUGS (ALT 637 FOR MEDICARE OP): Performed by: NURSE PRACTITIONER

## 2024-09-13 PROCEDURE — 2500000002 HC RX 250 W HCPCS SELF ADMINISTERED DRUGS (ALT 637 FOR MEDICARE OP, ALT 636 FOR OP/ED): Performed by: NURSE PRACTITIONER

## 2024-09-13 PROCEDURE — 99223 1ST HOSP IP/OBS HIGH 75: CPT

## 2024-09-13 PROCEDURE — 85027 COMPLETE CBC AUTOMATED: CPT

## 2024-09-13 PROCEDURE — 85014 HEMATOCRIT: CPT

## 2024-09-13 PROCEDURE — 36415 COLL VENOUS BLD VENIPUNCTURE: CPT

## 2024-09-13 PROCEDURE — 36430 TRANSFUSION BLD/BLD COMPNT: CPT

## 2024-09-13 PROCEDURE — 81003 URINALYSIS AUTO W/O SCOPE: CPT | Performed by: NURSE PRACTITIONER

## 2024-09-13 PROCEDURE — 43235 EGD DIAGNOSTIC BRUSH WASH: CPT | Performed by: INTERNAL MEDICINE

## 2024-09-13 PROCEDURE — 2500000004 HC RX 250 GENERAL PHARMACY W/ HCPCS (ALT 636 FOR OP/ED)

## 2024-09-13 PROCEDURE — 36415 COLL VENOUS BLD VENIPUNCTURE: CPT | Performed by: NURSE PRACTITIONER

## 2024-09-13 PROCEDURE — P9016 RBC LEUKOCYTES REDUCED: HCPCS

## 2024-09-13 PROCEDURE — 3700000002 HC GENERAL ANESTHESIA TIME - EACH INCREMENTAL 1 MINUTE

## 2024-09-13 PROCEDURE — 3700000001 HC GENERAL ANESTHESIA TIME - INITIAL BASE CHARGE

## 2024-09-13 PROCEDURE — 2500000005 HC RX 250 GENERAL PHARMACY W/O HCPCS: Performed by: NURSE ANESTHETIST, CERTIFIED REGISTERED

## 2024-09-13 PROCEDURE — 2500000004 HC RX 250 GENERAL PHARMACY W/ HCPCS (ALT 636 FOR OP/ED): Performed by: NURSE ANESTHETIST, CERTIFIED REGISTERED

## 2024-09-13 RX ORDER — PHENYLEPHRINE HCL IN 0.9% NACL 1 MG/10 ML
SYRINGE (ML) INTRAVENOUS AS NEEDED
Status: DISCONTINUED | OUTPATIENT
Start: 2024-09-13 | End: 2024-09-13

## 2024-09-13 RX ORDER — QUETIAPINE FUMARATE 25 MG/1
25 TABLET, FILM COATED ORAL ONCE
Status: COMPLETED | OUTPATIENT
Start: 2024-09-13 | End: 2024-09-13

## 2024-09-13 RX ORDER — PANTOPRAZOLE SODIUM 40 MG/10ML
40 INJECTION, POWDER, LYOPHILIZED, FOR SOLUTION INTRAVENOUS 2 TIMES DAILY
Status: DISCONTINUED | OUTPATIENT
Start: 2024-09-13 | End: 2024-09-17 | Stop reason: HOSPADM

## 2024-09-13 RX ORDER — DORZOLAMIDE HCL 20 MG/ML
1 SOLUTION/ DROPS OPHTHALMIC 3 TIMES DAILY
COMMUNITY

## 2024-09-13 RX ORDER — ERGOCALCIFEROL 1.25 MG/1
1250 CAPSULE ORAL
Status: DISCONTINUED | OUTPATIENT
Start: 2024-09-14 | End: 2024-09-17 | Stop reason: HOSPADM

## 2024-09-13 RX ORDER — LOSARTAN POTASSIUM 50 MG/1
50 TABLET ORAL DAILY
COMMUNITY
End: 2024-09-17 | Stop reason: HOSPADM

## 2024-09-13 RX ORDER — LIDOCAINE HCL/PF 100 MG/5ML
SYRINGE (ML) INTRAVENOUS AS NEEDED
Status: DISCONTINUED | OUTPATIENT
Start: 2024-09-13 | End: 2024-09-13

## 2024-09-13 RX ORDER — ASPIRIN 325 MG
50000 TABLET, DELAYED RELEASE (ENTERIC COATED) ORAL WEEKLY
Status: DISCONTINUED | OUTPATIENT
Start: 2024-09-13 | End: 2024-09-13

## 2024-09-13 RX ORDER — QUETIAPINE FUMARATE 25 MG/1
25 TABLET, FILM COATED ORAL 2 TIMES DAILY
COMMUNITY

## 2024-09-13 RX ORDER — LATANOPROST 50 UG/ML
1 SOLUTION/ DROPS OPHTHALMIC NIGHTLY
COMMUNITY

## 2024-09-13 RX ORDER — HYDROCHLOROTHIAZIDE 25 MG/1
25 TABLET ORAL DAILY
COMMUNITY
End: 2024-09-17 | Stop reason: HOSPADM

## 2024-09-13 RX ORDER — HYDROCHLOROTHIAZIDE 25 MG/1
25 TABLET ORAL DAILY
Status: DISCONTINUED | OUTPATIENT
Start: 2024-09-13 | End: 2024-09-14

## 2024-09-13 RX ORDER — PROPOFOL 10 MG/ML
INJECTION, EMULSION INTRAVENOUS AS NEEDED
Status: DISCONTINUED | OUTPATIENT
Start: 2024-09-13 | End: 2024-09-13

## 2024-09-13 RX ORDER — LATANOPROST 50 UG/ML
1 SOLUTION/ DROPS OPHTHALMIC NIGHTLY
Status: DISCONTINUED | OUTPATIENT
Start: 2024-09-13 | End: 2024-09-17 | Stop reason: HOSPADM

## 2024-09-13 RX ORDER — QUETIAPINE FUMARATE 25 MG/1
25 TABLET, FILM COATED ORAL 2 TIMES DAILY
Status: DISCONTINUED | OUTPATIENT
Start: 2024-09-13 | End: 2024-09-17 | Stop reason: HOSPADM

## 2024-09-13 RX ORDER — DORZOLAMIDE HCL 20 MG/ML
1 SOLUTION/ DROPS OPHTHALMIC 3 TIMES DAILY
Status: DISCONTINUED | OUTPATIENT
Start: 2024-09-13 | End: 2024-09-17 | Stop reason: HOSPADM

## 2024-09-13 RX ORDER — LOSARTAN POTASSIUM 50 MG/1
50 TABLET ORAL DAILY
Status: DISCONTINUED | OUTPATIENT
Start: 2024-09-13 | End: 2024-09-14

## 2024-09-13 SDOH — SOCIAL STABILITY: SOCIAL INSECURITY: ARE THERE ANY APPARENT SIGNS OF INJURIES/BEHAVIORS THAT COULD BE RELATED TO ABUSE/NEGLECT?: NO

## 2024-09-13 SDOH — SOCIAL STABILITY: SOCIAL INSECURITY: ABUSE: ADULT

## 2024-09-13 SDOH — SOCIAL STABILITY: SOCIAL INSECURITY: DO YOU FEEL ANYONE HAS EXPLOITED OR TAKEN ADVANTAGE OF YOU FINANCIALLY OR OF YOUR PERSONAL PROPERTY?: NO

## 2024-09-13 SDOH — HEALTH STABILITY: MENTAL HEALTH: CURRENT SMOKER: 0

## 2024-09-13 SDOH — SOCIAL STABILITY: SOCIAL INSECURITY: DOES ANYONE TRY TO KEEP YOU FROM HAVING/CONTACTING OTHER FRIENDS OR DOING THINGS OUTSIDE YOUR HOME?: NO

## 2024-09-13 SDOH — SOCIAL STABILITY: SOCIAL INSECURITY: ARE YOU OR HAVE YOU BEEN THREATENED OR ABUSED PHYSICALLY, EMOTIONALLY, OR SEXUALLY BY ANYONE?: NO

## 2024-09-13 SDOH — SOCIAL STABILITY: SOCIAL INSECURITY: HAS ANYONE EVER THREATENED TO HURT YOUR FAMILY OR YOUR PETS?: NO

## 2024-09-13 SDOH — SOCIAL STABILITY: SOCIAL INSECURITY: HAVE YOU HAD ANY THOUGHTS OF HARMING ANYONE ELSE?: NO

## 2024-09-13 SDOH — SOCIAL STABILITY: SOCIAL INSECURITY: DO YOU FEEL UNSAFE GOING BACK TO THE PLACE WHERE YOU ARE LIVING?: NO

## 2024-09-13 SDOH — SOCIAL STABILITY: SOCIAL INSECURITY: HAVE YOU HAD THOUGHTS OF HARMING ANYONE ELSE?: NO

## 2024-09-13 ASSESSMENT — LIFESTYLE VARIABLES
HOW OFTEN DO YOU HAVE 6 OR MORE DRINKS ON ONE OCCASION: NEVER
AUDIT-C TOTAL SCORE: 0
HOW MANY STANDARD DRINKS CONTAINING ALCOHOL DO YOU HAVE ON A TYPICAL DAY: PATIENT DOES NOT DRINK
HOW OFTEN DO YOU HAVE A DRINK CONTAINING ALCOHOL: NEVER
AUDIT-C TOTAL SCORE: 0
SKIP TO QUESTIONS 9-10: 1

## 2024-09-13 ASSESSMENT — ACTIVITIES OF DAILY LIVING (ADL)
DRESSING YOURSELF: INDEPENDENT
WALKS IN HOME: NEEDS ASSISTANCE
ADEQUATE_TO_COMPLETE_ADL: YES
HEARING - RIGHT EAR: HEARING AID
TOILETING: NEEDS ASSISTANCE
ASSISTIVE_DEVICE: WALKER
HEARING - LEFT EAR: HEARING AID
GROOMING: INDEPENDENT
BATHING: NEEDS ASSISTANCE
PATIENT'S MEMORY ADEQUATE TO SAFELY COMPLETE DAILY ACTIVITIES?: YES
JUDGMENT_ADEQUATE_SAFELY_COMPLETE_DAILY_ACTIVITIES: YES
FEEDING YOURSELF: INDEPENDENT

## 2024-09-13 ASSESSMENT — COGNITIVE AND FUNCTIONAL STATUS - GENERAL
PERSONAL GROOMING: A LITTLE
DRESSING REGULAR UPPER BODY CLOTHING: A LITTLE
STANDING UP FROM CHAIR USING ARMS: A LITTLE
HELP NEEDED FOR BATHING: A LITTLE
DAILY ACTIVITIY SCORE: 22
WALKING IN HOSPITAL ROOM: A LITTLE
MOVING TO AND FROM BED TO CHAIR: A LITTLE
DRESSING REGULAR LOWER BODY CLOTHING: A LITTLE
TURNING FROM BACK TO SIDE WHILE IN FLAT BAD: A LITTLE
MOVING FROM LYING ON BACK TO SITTING ON SIDE OF FLAT BED WITH BEDRAILS: A LITTLE
TOILETING: A LITTLE
WALKING IN HOSPITAL ROOM: A LITTLE
CLIMB 3 TO 5 STEPS WITH RAILING: A LITTLE
MOBILITY SCORE: 20
MOBILITY SCORE: 18
TURNING FROM BACK TO SIDE WHILE IN FLAT BAD: A LITTLE
PATIENT BASELINE BEDBOUND: NO
MOVING TO AND FROM BED TO CHAIR: A LITTLE
CLIMB 3 TO 5 STEPS WITH RAILING: A LITTLE
DAILY ACTIVITIY SCORE: 19
HELP NEEDED FOR BATHING: A LITTLE
TOILETING: A LITTLE

## 2024-09-13 ASSESSMENT — PAIN SCALES - GENERAL
PAINLEVEL_OUTOF10: 0 - NO PAIN

## 2024-09-13 ASSESSMENT — PAIN - FUNCTIONAL ASSESSMENT
PAIN_FUNCTIONAL_ASSESSMENT: 0-10

## 2024-09-13 ASSESSMENT — COLUMBIA-SUICIDE SEVERITY RATING SCALE - C-SSRS
2. HAVE YOU ACTUALLY HAD ANY THOUGHTS OF KILLING YOURSELF?: NO
6. HAVE YOU EVER DONE ANYTHING, STARTED TO DO ANYTHING, OR PREPARED TO DO ANYTHING TO END YOUR LIFE?: NO
1. IN THE PAST MONTH, HAVE YOU WISHED YOU WERE DEAD OR WISHED YOU COULD GO TO SLEEP AND NOT WAKE UP?: NO

## 2024-09-13 ASSESSMENT — PATIENT HEALTH QUESTIONNAIRE - PHQ9
SUM OF ALL RESPONSES TO PHQ9 QUESTIONS 1 & 2: 0
2. FEELING DOWN, DEPRESSED OR HOPELESS: NOT AT ALL
1. LITTLE INTEREST OR PLEASURE IN DOING THINGS: NOT AT ALL

## 2024-09-13 NOTE — CONSULTS
Inpatient consult to Gastroenterology  Consult performed by: Melquiades Lea DO  Consult ordered by: Roxanne Márquez, APRN-CNP  Reason for consult: GI bleed      Reason For Consult  GI bleed    History Of Present Illness  Rasheed Antonio is a 84 y.o. male presenting with diarrhea.    HPI  Rasheed Antonio is a 84 y.o. male with a past medical history of HTN, CKD III, GERD, dementia, anxiety, who presented to [facility] ED today via EMS from [facility] with a hemoglobin of 6.4. States he had 4 episodes of diarrhea that were very dark yesterday.  Denies urinary symptoms or hematuria. Denies nausea or vomiting. Denies weakness, dizziness, chest pain, shortness of breath, diarrhea, or constipation. Denies fever or chills.     ED Course: Hemodynamically stable. Afebrile. /81, HR 95, RR 16, 94%RA. EKG unavailable for my review. Labs: glucose 103. Chloride 109. BUN 57, creatinine 2.22, GFR 28. ALT 6. RBC 2.59, H&H 7.3/23.5. Neutrophils 6.57. UA ordered.      Past Medical History  He has a past medical history of Personal history of other diseases of the respiratory system (09/28/2016), Personal history of other endocrine, nutritional and metabolic disease (01/13/2017), Unspecified asthma with (acute) exacerbation ([facility]) (08/15/2018), and Vitamin D deficiency, unspecified (02/11/2022).    Surgical History  He has no past surgical history on file.     Social History  He reports that he has never smoked. He has never used smokeless tobacco. He reports that he does not currently use alcohol. He reports that he does not use drugs.    Family History  No family history on file.     Allergies  Grass pollen and Penicillins    Review of Systems  See HPI     Physical Exam  CONSTITUTIONAL - well nourished, well developed, looks like stated age, in no acute distress, not ill-appearing, and not tired appearing  SKIN - normal skin color and pigmentation, normal skin turgor without rash, lesions, or nodules  visualized  HEAD - no trauma, normocephalic  EYES - pupils are equal and reactive to light, extraocular muscles are intact, and normal external exam  NECK - supple without rigidity, no neck mass was observed, no thyromegaly or thyroid nodules  CHEST - clear to auscultation, no wheezing, no crackles and no rales, good effort  CARDIAC - regular rate and regular rhythm, no skipped beats, no murmur  ABDOMEN - no organomegaly, soft, nontender, nondistended, normal bowel sounds, no guarding/rebound/rigidity, negative McBurney sign and negative Ayala sign  EXTREMITIES - no edema, no deformities  NEUROLOGICAL - normal gait, normal balance, normal motor, no ataxia, DTRs equal and symmetrical; alert, oriented and no focal signs  PSYCHIATRIC - alert, history of dementia  IMMUNOLOGIC - no cervical lymphadenopathy        Last Recorded Vitals  /78   Pulse 87   Temp 36.4 °C (97.5 °F)   Resp 18   Wt 59 kg (130 lb)   SpO2 97%        Assessment/Plan   Rasheed Antonio is a 84 y.o. male with a past medical history of HTN, CKD III, GERD, dementia, anxiety, who presented to [facility] ED today via EMS from [facility] with a hemoglobin of 6.4.  Baseline hemoglobin is around 14-15.  GI consulted for concern of GI bleed.    Anemia  Suspected upper GI bleed  Melena versus hematochezia  GERD  -Patient reports 4 dark stools a day prior to hospitalization, has history of GERD with no prior history of EGD or colonoscopy per chart review.  He states he had last colonoscopy within last 10 years but does not remember recommendations.  -Keep hemoglobin above 7.2  -Pending results of EGD 9/13/2024    Dr. Melquiades Lea   Internal Medicine PGY-1  Available on Threadbox for any questions.    This is a preliminary note pending signature from the attending physician.

## 2024-09-13 NOTE — ED PROVIDER NOTES
HPI   Chief Complaint   Patient presents with    Abnormal Lab     PT. BIBA FROM [facility] FOR LOW 6.4 HGB. PT. DENIES NOTICING BLOOD IN STOOL, URINE. DENIES WEAKNESS, DIZZINESS, SOB.        Patient is a healthy nontoxic-appearing 84-year-old male with past medical history of hypertension, mild dementia, esophageal reflux, glaucoma, stage III chronic kidney disease, anxiety, confusion, presents to the emergency today for complaint of abnormal lab work.  Patient states he received notification from his primary care provider that his hemoglobin was 6.4 plan to go to the emergency room for evaluation.  Patient states he was experiencing some abdominal pain previously however denies any abdominal pain upon arrival to the emergency room.  Patient has he has been having some dark tarry stools over the past several days.  Patient denies any dietary changes, fatigue, lightheadedness or dizziness, syncopal or syncopal events, chest pain, shortness of breath difficulty breathing, nausea, vomit, diarrhea or constipation, urinary complaints, testicular pain, fever, shaking, or chills.              Patient History   Past Medical History:   Diagnosis Date    Personal history of other diseases of the respiratory system 09/28/2016    History of acute bronchitis    Personal history of other endocrine, nutritional and metabolic disease 01/13/2017    History of hyperlipidemia    Unspecified asthma with (acute) exacerbation ([facility]) 08/15/2018    Asthma exacerbation    Vitamin D deficiency, unspecified 02/11/2022    Vitamin D deficiency     History reviewed. No pertinent surgical history.  No family history on file.  Social History     Tobacco Use    Smoking status: Never    Smokeless tobacco: Never   Vaping Use    Vaping status: Never Used   Substance Use Topics    Alcohol use: Not Currently    Drug use: Never       Physical Exam   ED Triage Vitals [09/12/24 1905]   Temp Heart Rate Resp BP   36.9 °C (98.4 °F) 95 16 156/81      SpO2 Temp  Source Heart Rate Source Patient Position   94 % Temporal Monitor Lying      BP Location FiO2 (%)     Right arm --       Physical Exam  Vitals and nursing note reviewed. Exam conducted with a chaperone present.   Constitutional:       General: He is not in acute distress.     Appearance: Normal appearance. He is not ill-appearing, toxic-appearing or diaphoretic.   HENT:      Head: Normocephalic.      Nose: Nose normal.      Mouth/Throat:      Mouth: Mucous membranes are moist.   Eyes:      Extraocular Movements: Extraocular movements intact.      Pupils: Pupils are equal, round, and reactive to light.   Cardiovascular:      Rate and Rhythm: Normal rate and regular rhythm.      Pulses: Normal pulses.      Heart sounds: Normal heart sounds. No murmur heard.     No friction rub. No gallop.   Pulmonary:      Effort: Pulmonary effort is normal. No respiratory distress.      Breath sounds: Normal breath sounds. No stridor. No wheezing, rhonchi or rales.   Chest:      Chest wall: No tenderness.   Abdominal:      General: Abdomen is flat. There is no distension.      Palpations: Abdomen is soft. There is no mass.      Tenderness: There is no abdominal tenderness. There is no right CVA tenderness, left CVA tenderness, guarding or rebound.      Hernia: No hernia is present.   Musculoskeletal:         General: Normal range of motion.      Cervical back: Normal range of motion and neck supple.   Skin:     General: Skin is warm and dry.      Capillary Refill: Capillary refill takes less than 2 seconds.      Coloration: Skin is not jaundiced or pale.      Findings: No bruising, erythema, lesion or rash.   Neurological:      Mental Status: He is alert.           ED Course & MDM   ED Course as of 09/13/24 0213   Fri Sep 13, 2024   0211 Chest x-ray reveals  IMPRESSION:  No airspace consolidation or pleural effusion.   [EC]      ED Course User Index  [EC] Amadou Sosa, EMIR-CNP         Diagnoses as of 09/13/24 0213   Dark stools    Anemia, unspecified type   Generalized abdominal pain                 No data recorded     Shemar Coma Scale Score: 15 (09/12/24 2312 : Taya Enriquez RN)                           Medical Decision Making  Given patient's pain presentation a thorough exam was performed patient tesha hemodynamically stable during emergency evaluation, is afebrile, no reproduced abdominal tenderness upon palpation with bowel sounds present all 4 quadrants, no CVA tenderness upon palpation, rectal exam reveals no active bleeding or dark tarry stools during emergency evaluation, lab work is ordered including Protonix.  Rectal exam reveals no active bleeding or melena present.  Lab work revealed several irregularities without any critical lab values, H&H reveals a value of 7.3 and 23.5.  Chest x-ray as interpreted by radiologist reveals no acute cardiopulmonary process.  Previous lab work was reviewed that does reveal H&H is typically normal.  Given patient has been complaining of dark tarry stools, intermittent abdominal pain I discussed with patient regards to admission for GI bleed and anemia.  Patient was agreeable with this plan.  I consulted Dr. Sommers regards to admission.  Dr. Sommers has agreed to admit patient to his service and will manage inpatient care.    JUDE Hope     Portions of this note were generated using digital voice recognition software, and may contain grammatical errors      Procedure  Procedures     JUDE Hope  09/13/24 0213

## 2024-09-13 NOTE — CARE PLAN
Gastroenterology  Chart Update    EGD by Dr Gan 9/13/24  Grade D esophagitis in the middle third of the esophagus, lower third of the esophagus and GE junction  Medium type I hiatal hernia  The cardia, fundus of the stomach, body of the stomach and antrum appeared normal.  The duodenal bulb and 2nd part of the duodenum appeared normal.    PLAN  Recommend repeat EGD in 2 months, due 11/12/2024  Recommend pantoprazole 40 mg twice daily for 2 months  Recommend full liquid diet today with possible advancement 9/14/24  Continue supportive care per primary team    GI to sign off please call questions or concerns  Kylie Danielle PA-C

## 2024-09-13 NOTE — PROGRESS NOTES
09/13/24 1314   Discharge Planning   Type of Residence Assisted living   [facility] [address]    Who is requesting discharge planning? Provider   Expected Discharge Disposition Home     Called in to patients room and his cell number.  As per ER note I called [facility]. He does reside there.  Message left wit nursing supervisor  to return my call.  HGB 6.4 , receiving blod transfusion.

## 2024-09-13 NOTE — NURSING NOTE
Endoscopy called and said patient will be going down for a procedure. Spoke to COREY Mercado and informed her that patient currently has blood infusing. Jess sending an RN up to accompany patient during transport.

## 2024-09-13 NOTE — CARE PLAN
The patient's goals for the shift include comfort and no bloody stool.     Patient met his goal, no bloody stool reported and denies any pain/discomfort.

## 2024-09-13 NOTE — PROGRESS NOTES
Physical Therapy    Physical Therapy Evaluation    Patient Name: Rasheed Antonio  MRN: [id number]  Today's Date: 9/13/2024   Time Calculation  Start Time: 0913  Stop Time: 0928  Time Calculation (min): 15 min  614/614-A    Assessment/Plan   PT Assessment  PT Assessment Results: Decreased strength, Impaired balance, Decreased endurance, Decreased mobility, Decreased cognition, Decreased safety awareness  Rehab Prognosis: Fair  Evaluation/Treatment Tolerance: Patient limited by fatigue  End of Session Communication: Bedside nurse  Assessment Comment: Pt admitted 9/12 with low hemoglobin from [facility]. Pt demos decreased endurance and balance placing him at an increased fall risk. Pt with safety concerns and baseline dementia. Pt would benefit from low intensity therapy and initial 24hr supervision.  End of Session Patient Position: Up in chair, Alarm on  IP OR SWING BED PT PLAN  Inpatient or Swing Bed: Inpatient  PT Plan  Treatment/Interventions: Bed mobility, Transfer training, Gait training, Balance training, Strengthening, Endurance training, Therapeutic exercise, Therapeutic activity, Home exercise program  PT Plan: Ongoing PT  PT Frequency: 2 times per week  PT Discharge Recommendations: Low intensity level of continued care, 24 hr supervision due to cognition  PT - OK to Discharge: Yes (once medically cleared)    Subjective     Current Problem:  1. Dark stools        2. Anemia, unspecified type  Esophagogastroduodenoscopy (EGD)      3. Generalized abdominal pain          Patient Active Problem List   Diagnosis    Hypertension, essential    Mild dementia associated with alcoholism, without behavioral disturbance, psychotic disturbance, mood disturbance, or anxiety (Multi)    Gastroesophageal reflux disease without esophagitis    Glaucoma    CKD (chronic kidney disease) stage 3, GFR 30-59 ml/min (Multi)    Anxiety    Confusion    Anemia       General Visit Information:  General  Reason for Referral: PT eval and treat;  impaired mobility  Referred By: Roxanne Márquez APRN-CNP  Past Medical History Relevant to Rehab: HTN, anxiety, CKD, mild dementia, glaucoma  Co-Treatment: OT  Co-Treatment Reason: to maximize patient safety and participation  Prior to Session Communication: Bedside nurse  Patient Position Received: Bed, 2 rail up, Alarm off, not on at start of session  General Comment: Pt pleasant and agreeable to therapy    Home Living:  Home Living  Home Living Comments: Pt from [facility] and states he ambulates to dining yan independenlty and to bathroom with rollator. Walk in shower with chair and grabbars. Denies any recent falls.    Prior Level of Function:  Prior Function Per Pt/Caregiver Report  Level of [place]: Independent with ADLs and functional transfers, Needs assistance with ADLs    Precautions:  Precautions  Medical Precautions: Fall precautions     Objective     Pain:  Pain Assessment  Pain Assessment: 0-10  0-10 (Numeric) Pain Score: 0 - No pain    Cognition:  Cognition  Overall Cognitive Status: Impaired at baseline (Pt with decreased short term memory and safety concerns with mild cues)    General Assessments:      Activity Tolerance  Endurance: Decreased tolerance for upright activites  Sensation  Light Touch: No apparent deficits  Strength  Strength Comments: demos WFL BLE     Functional Assessments:     Bed Mobility  Bed Mobility:  (Pt completed supine to sit with SBA and HOB sightly elevated)  Transfers  Transfer:  (Pt completed STS from eob to FWW with CGA and cues for hand placement)  Ambulation/Gait Training  Ambulation/Gait Training Performed:  (completed ~75'x1 with Katelin for FWW management and cues for safety. Denies dizziness. No acute LOB.)     Extremity/Trunk Assessments:        RLE   RLE : Within Functional Limits  LLE   LLE : Within Functional Limits    Outcome Measures:     [facility] Basic Mobility  Turning from your back to your side while in a flat bed without using bedrails: A  little  Moving from lying on your back to sitting on the side of a flat bed without using bedrails: A little  Moving to and from bed to chair (including a wheelchair): A little  Standing up from a chair using your arms (e.g. wheelchair or bedside chair): A little  To walk in hospital room: A little  Climbing 3-5 steps with railing: A little  Basic Mobility - Total Score: 18     Goals:  Encounter Problems       Encounter Problems (Active)       PT Problem       PT Goal 1 STG - Pt will transition supine <> sitting with MOD I  (Progressing)       Start:  09/13/24    Expected End:  09/20/24            PT Goal 2 STG - Pt will transfer STS with LRD supervision  (Progressing)       Start:  09/13/24    Expected End:  09/20/24            PT Goal 3 STG - Pt will amb 100' using LRD with supervision  (Progressing)       Start:  09/13/24    Expected End:  09/20/24               Pain - Adult            Education Documentation  Mobility Training, taught by Florencia Hicks, PT at 9/13/2024 11:28 AM.  Learner: Patient  Readiness: Acceptance  Method: Explanation  Response: Verbalizes Understanding    Education Comments  No comments found.

## 2024-09-13 NOTE — ANESTHESIA PREPROCEDURE EVALUATION
Patient: Rasheed Antonio    Procedure Information       Date/Time: 09/13/24 6438    Scheduled providers: Dayton Gan MD; Cassi Grider MD    Procedure: EGD    Location: [facility]            Relevant Problems   Anesthesia (within normal limits)      Cardiac   (+) Hypertension, essential      Neuro   (+) Anxiety   (+) Mild dementia associated with alcoholism, without behavioral disturbance, psychotic disturbance, mood disturbance, or anxiety (Multi)      GI   (+) Gastroesophageal reflux disease without esophagitis      Hematology   (+) Anemia      HEENT   (+) Glaucoma       Clinical information reviewed:   Tobacco  Allergies  Meds   Med Hx  Surg Hx   Fam Hx  Soc Hx        NPO Detail:  No data recorded     Physical Exam    Airway  Mallampati: III  TM distance: >3 FB  Neck ROM: full     Cardiovascular - normal exam     Dental   (+) upper dentures, lower dentures     Pulmonary - normal exam     Abdominal - normal exam             Anesthesia Plan    History of general anesthesia?: yes  History of complications of general anesthesia?: no    ASA 3 - emergent     MAC     The patient is not a current smoker.    intravenous induction   Anesthetic plan and risks discussed with patient.    Plan discussed with CAA.

## 2024-09-13 NOTE — ANESTHESIA POSTPROCEDURE EVALUATION
Patient: Rasheed Antonio    Procedure Summary       Date: 09/13/24 Room / Location: [facility]    Anesthesia Start: 1505 Anesthesia Stop: 1524    Procedure: EGD Diagnosis: Anemia, unspecified type    Scheduled Providers: Dayton Gan MD; Cassi Grider MD Responsible Provider: Cassi Grider MD    Anesthesia Type: MAC ASA Status: 3 - Emergent            Anesthesia Type: MAC    Vitals Value Taken Time   /67 09/13/24 1523   Temp 36 09/13/24 1524   Pulse 90 09/13/24 1523   Resp 15 09/13/24 1524   SpO2 98 % 09/13/24 1523   Vitals shown include unfiled device data.    Anesthesia Post Evaluation    Patient location during evaluation: PACU  Patient participation: waiting for patient participation  Level of consciousness: sedated  Pain management: adequate  Airway patency: fixed obstruction  Cardiovascular status: acceptable  Respiratory status: acceptable and spontaneous ventilation  Hydration status: acceptable  Postoperative Nausea and Vomiting: none      No notable events documented.

## 2024-09-13 NOTE — H&P
History Of Present Illness  Rasheed Antonio is a 84 y.o. male with a past medical history of HTN, CKD III, GERD, dementia, anxiety, who presented to [facility] ED today via EMS from [facility] with a hemoglobin of 6.4. States he had a couple episodes of diarrhea that were very dark a couple days ago but none yesterday. Patient has dementia so unclear if patient is a good historian. Denies urinary symptoms or hematuria. Denies nausea or vomiting. Denies weakness, dizziness, chest pain, shortness of breath, abdominal pain, diarrhea, or constipation. Denies fever or chills.    ED Course: Hemodynamically stable. Afebrile. /81, HR 95, RR 16, 94%RA. EKG unavailable for my review. Labs: glucose 103. Chloride 109. BUN 57, creatinine 2.22, GFR 28. ALT 6. RBC 2.59, H&H 7.3/23.5. Neutrophils 6.57. UA ordered. Patient will be admitted and continue to follow under the care of Dr. Luc Sommers. I was asked to do H&P and place initial admission orders.      Past Medical History  Past Medical History:   Diagnosis Date    Personal history of other diseases of the respiratory system 09/28/2016    History of acute bronchitis    Personal history of other endocrine, nutritional and metabolic disease 01/13/2017    History of hyperlipidemia    Unspecified asthma with (acute) exacerbation ([facility]) 08/15/2018    Asthma exacerbation    Vitamin D deficiency, unspecified 02/11/2022    Vitamin D deficiency       Surgical History  History reviewed. No pertinent surgical history.     Social History  He reports that he has never smoked. He has never used smokeless tobacco. He reports that he does not currently use alcohol. He reports that he does not use drugs.    Family History  No family history on file.     Allergies  Grass pollen and Penicillins    Review of Systems   10 point ROS reviewed and otherwise negative except what is stated in HPI.   Physical Exam  Constitutional:       Appearance: Normal appearance.   HENT:  "     Head: Normocephalic and atraumatic.      Nose: Nose normal.      Mouth/Throat:      Mouth: Mucous membranes are moist.      Pharynx: Oropharynx is clear.   Eyes:      Extraocular Movements: Extraocular movements intact.      Conjunctiva/sclera: Conjunctivae normal.      Pupils: Pupils are equal, round, and reactive to light.   Cardiovascular:      Rate and Rhythm: Normal rate and regular rhythm.      Pulses: Normal pulses.      Heart sounds: Normal heart sounds.   Pulmonary:      Effort: Pulmonary effort is normal.      Breath sounds: Normal breath sounds.   Abdominal:      General: Abdomen is flat. Bowel sounds are normal.      Palpations: Abdomen is soft.   Musculoskeletal:         General: Normal range of motion.      Cervical back: Normal range of motion and neck supple.   Skin:     General: Skin is warm and dry.      Capillary Refill: Capillary refill takes less than 2 seconds.   Neurological:      General: No focal deficit present.      Mental Status: He is alert and oriented to person, place, and time.   Psychiatric:         Mood and Affect: Mood normal.         Behavior: Behavior normal.         Thought Content: Thought content normal.         Judgment: Judgment normal.          Last Recorded Vitals  Blood pressure 145/73, pulse 93, temperature 36.9 °C (98.4 °F), temperature source Temporal, resp. rate (!) 21, height 1.626 m (5' 4\"), weight 59 kg (130 lb), SpO2 98%.    Relevant Results  Results for orders placed or performed during the hospital encounter of 09/12/24 (from the past 24 hour(s))   CBC and Auto Differential   Result Value Ref Range    WBC 10.2 4.4 - 11.3 x10*3/uL    nRBC 0.0 0.0 - 0.0 /100 WBCs    RBC 2.59 (L) 4.50 - 5.90 x10*6/uL    Hemoglobin 7.3 (L) 13.5 - 17.5 g/dL    Hematocrit 23.5 (L) 41.0 - 52.0 %    MCV 91 80 - 100 fL    MCH 28.2 26.0 - 34.0 pg    MCHC 31.1 (L) 32.0 - 36.0 g/dL    RDW 13.0 11.5 - 14.5 %    Platelets 297 150 - 450 x10*3/uL    Neutrophils % 64.4 40.0 - 80.0 %    " Immature Granulocytes %, Automated 0.7 0.0 - 0.9 %    Lymphocytes % 17.5 13.0 - 44.0 %    Monocytes % 9.9 2.0 - 10.0 %    Eosinophils % 7.1 0.0 - 6.0 %    Basophils % 0.4 0.0 - 2.0 %    Neutrophils Absolute 6.57 (H) 1.60 - 5.50 x10*3/uL    Immature Granulocytes Absolute, Automated 0.07 0.00 - 0.50 x10*3/uL    Lymphocytes Absolute 1.79 0.80 - 3.00 x10*3/uL    Monocytes Absolute 1.01 (H) 0.05 - 0.80 x10*3/uL    Eosinophils Absolute 0.72 (H) 0.00 - 0.40 x10*3/uL    Basophils Absolute 0.04 0.00 - 0.10 x10*3/uL   Coagulation Screen   Result Value Ref Range    Protime 11.5 9.8 - 12.8 seconds    INR 1.0 0.9 - 1.1    aPTT 34 27 - 38 seconds   Type And Screen   Result Value Ref Range    ABO TYPE A     Rh TYPE NEG     ANTIBODY SCREEN NEG    Comprehensive Metabolic Panel   Result Value Ref Range    Glucose 103 (H) 74 - 99 mg/dL    Sodium 140 136 - 145 mmol/L    Potassium 3.8 3.5 - 5.3 mmol/L    Chloride 109 (H) 98 - 107 mmol/L    Bicarbonate 22 21 - 32 mmol/L    Anion Gap 13 10 - 20 mmol/L    Urea Nitrogen 57 (H) 6 - 23 mg/dL    Creatinine 2.22 (H) 0.50 - 1.30 mg/dL    eGFR 28 (L) >60 mL/min/1.73m*2    Calcium 9.3 8.6 - 10.3 mg/dL    Albumin 3.8 3.4 - 5.0 g/dL    Alkaline Phosphatase 47 33 - 136 U/L    Total Protein 7.0 6.4 - 8.2 g/dL    AST 10 9 - 39 U/L    Bilirubin, Total 0.3 0.0 - 1.2 mg/dL    ALT 6 (L) 10 - 52 U/L   VERIFY ABO/Rh Group Test   Result Value Ref Range    ABO TYPE A     Rh TYPE NEG         Assessment/Plan   Assessment & Plan  Anemia    84 year old male with a past medical history of HTN, CKD III, GERD, dementia, anxiety, who presented to [facility] ED today via EMS from [facility] with a hemoglobin of 6.4. States he had a couple episodes of diarrhea that were very dark a couple days ago but none yesterday. GI consulted. Patient to be admitted to hospital for further medical management.     #?GI bleed (patient reported dark stools)  #Acute on chronic anemia  #DILMA on CKD  -Admit to OBS/RMF to   Luc Sommers   -GI consult and appreciate recs  -H&H 7.3/23.5. Transfuse pRBC if Hgb<7. Will trend H&H.   -Blood consent signed and in computer  -Check hemoccult   -LR@75ml/hr  -NPO. Attending to advance as tolerated.  -CXR ordered  -UA ordered and pending   -Repeat labs (type and screen ordered) in AM      Chronic conditions  #HTN, GERD, Dementia, anxiety  -Continue home meds as appropriate when nursing completes home med rec.   -PT/OT/SW  -DNAR/DNI    #DVT prophylaxis   Hold chemical prophylaxis d/t suspected GI bleed  Up in chair TID   SCD's     I spent 35 minutes in the professional and overall care of this patient.    Roxanne Márquez, APRN-CNP

## 2024-09-13 NOTE — CARE PLAN
Called by nursing for reported worsening h/h.  Patient stable but requiring blood transfusion recommened by attending. Blood consent obtained on admission and on chart. Worsening h/h discussed with patient at bedside and agrees with transfusion today.  One unit prbc's ordered.  With repeat h/h this evening.

## 2024-09-13 NOTE — PROGRESS NOTES
Occupational Therapy    Evaluation    Patient Name: Rasheed Antonio  MRN: [id number]  Today's Date: 9/13/2024  Time Calculation  Start Time: 0912  Stop Time: 0928  Time Calculation (min): 16 min    Assessment  IP OT Assessment  OT Assessment: Patient requires assist for ADLs & functional transfers secondary to impaired activity tolerance and impaired dynamic stand balance; patient would benefit from 24 hour support with O.T. services at a low intensity to improve independence with ADLs, transfers & mobility.  Prognosis: Good  End of Session Patient Position: Up in chair, Alarm on (call light in reach)    Plan:  Treatment Interventions: ADL retraining, UE strengthening/ROM, Functional transfer training, Neuromuscular reeducation, Compensatory technique education  OT Frequency: 3 times per week  OT Discharge Recommendations: Moderate intensity level of continued care, 24 hr supervision due to cognition  OT - OK to Discharge: Yes (from an O.T. standpoint)    Subjective     Current Problem:  Patient Active Problem List   Diagnosis    Hypertension, essential    Mild dementia associated with alcoholism, without behavioral disturbance, psychotic disturbance, mood disturbance, or anxiety (Multi)    Gastroesophageal reflux disease without esophagitis    Glaucoma    CKD (chronic kidney disease) stage 3, GFR 30-59 ml/min (Multi)    Anxiety    Confusion    Anemia       General:  General  Reason for Referral: OT eval & treat for ADLs/safety (Anemia)  Referred By: EMIR Shen-CNP  Past Medical History Relevant to Rehab: anxiety, CKD, HTN, mild dementia, glaucoma  Co-Treatment: PT  Co-Treatment Reason: to maximize patient safety & mobility  Prior to Session Communication: Bedside nurse  Patient Position Received: Bed, 3 rail up  General Comment: Per conference with RN, patient is medically stable for therapy eval    Precautions:  Precautions Comment: hearing impairment, fall/safety, posey alarm in chair          Pain:  Pain Assessment  Pain Assessment: 0-10  0-10 (Numeric) Pain Score: 0 - No pain    Objective     Cognition:  Overall Cognitive Status:  (A & O x 2, disoriented to time. Follows directions well with mod cues for safety. Impaired memory, impaired insight)        Home Living:  Home Living Comments: Lives at assisted living, independent ADLs, transfers & mobility with rollator. IADLs provided at facility.          ADL:  Grooming Assistance: Stand by  UE Dressing Assistance: Minimal  LE Dressing Assistance: Minimal  Toileting Assistance with Device: Minimal    Activity Tolerance:  Endurance: Decreased tolerance for upright activites    Bed Mobility/Transfers:   Bed Mobility  Bed Mobility:  (SBA supine to sit)  Transfers  Transfer:  (CGA sit to stand from edge of bed)    Ambulation/Gait Training:  Functional Mobility  Functional Mobility Performed:  (min assist for balance with wheeled walker)    Sitting Balance:  Dynamic Sitting Balance  Dynamic Sitting-Level of Assistance: Distant supervision    Standing Balance:  Dynamic Standing Balance  Dynamic Standing-Level of Assistance: Minimum assistance    Sensation:  Light Touch: No apparent deficits    Strength:  Strength Comments: BUE grossly 3-/5 proximally, 4-/5 distally    Coordination:  Movements are Fluid and Coordinated: Yes         Outcome Measures: [facility] Daily Activity  Putting on and taking off regular lower body clothing: A little  Bathing (including washing, rinsing, drying): A little  Putting on and taking off regular upper body clothing: A little  Toileting, which includes using toilet, bedpan or urinal: A little  Taking care of personal grooming such as brushing teeth: A little  Eating Meals: None  Daily Activity - Total Score: 19           EDUCATION:     Education Documentation  ADL Training, taught by Emily Nunez OT at 9/13/2024 11:16 AM.  Learner: Patient  Readiness: Acceptance  Method: Explanation  Response: Verbalizes Understanding, Needs  Reinforcement    Education Comments  No comments found.        Goals:   Encounter Problems       Encounter Problems (Active)       OT Goals       Tolerate 15 minutes UE therex with rest periods prn to promote increased activity tolerance for ADLs  (Progressing)       Start:  09/13/24    Expected End:  09/27/24            Increase LE bathing & dressing to supervision with adaptive equipment as needed.  (Progressing)       Start:  09/13/24    Expected End:  09/27/24            Increase functional transfers to/from bed, chair & commode to supervision with DME for safety  (Progressing)       Start:  09/13/24    Expected End:  09/27/24            Demonstrate compliance to energy conservation techs and safety techs during ADLs   (Progressing)       Start:  09/13/24    Expected End:  09/27/24

## 2024-09-14 PROBLEM — R19.5 DARK STOOLS: Status: ACTIVE | Noted: 2024-09-14

## 2024-09-14 LAB
ANION GAP SERPL CALC-SCNC: 12 MMOL/L (ref 10–20)
BLOOD EXPIRATION DATE: NORMAL
BUN SERPL-MCNC: 39 MG/DL (ref 6–23)
CALCIUM SERPL-MCNC: 8.6 MG/DL (ref 8.6–10.3)
CHLORIDE SERPL-SCNC: 112 MMOL/L (ref 98–107)
CO2 SERPL-SCNC: 20 MMOL/L (ref 21–32)
CREAT SERPL-MCNC: 2.11 MG/DL (ref 0.5–1.3)
DISPENSE STATUS: NORMAL
EGFRCR SERPLBLD CKD-EPI 2021: 30 ML/MIN/1.73M*2
ERYTHROCYTE [DISTWIDTH] IN BLOOD BY AUTOMATED COUNT: 13.5 % (ref 11.5–14.5)
GLUCOSE SERPL-MCNC: 87 MG/DL (ref 74–99)
HCT VFR BLD AUTO: 22.5 % (ref 41–52)
HGB BLD-MCNC: 7.1 G/DL (ref 13.5–17.5)
MCH RBC QN AUTO: 27.7 PG (ref 26–34)
MCHC RBC AUTO-ENTMCNC: 31.6 G/DL (ref 32–36)
MCV RBC AUTO: 88 FL (ref 80–100)
NRBC BLD-RTO: 0 /100 WBCS (ref 0–0)
PLATELET # BLD AUTO: 266 X10*3/UL (ref 150–450)
POTASSIUM SERPL-SCNC: 3.6 MMOL/L (ref 3.5–5.3)
PRODUCT BLOOD TYPE: 600
PRODUCT CODE: NORMAL
RBC # BLD AUTO: 2.56 X10*6/UL (ref 4.5–5.9)
SODIUM SERPL-SCNC: 140 MMOL/L (ref 136–145)
UNIT ABO: NORMAL
UNIT NUMBER: NORMAL
UNIT RH: NORMAL
UNIT VOLUME: 350
WBC # BLD AUTO: 7 X10*3/UL (ref 4.4–11.3)
XM INTEP: NORMAL

## 2024-09-14 PROCEDURE — 80048 BASIC METABOLIC PNL TOTAL CA: CPT | Performed by: INTERNAL MEDICINE

## 2024-09-14 PROCEDURE — 2500000004 HC RX 250 GENERAL PHARMACY W/ HCPCS (ALT 636 FOR OP/ED)

## 2024-09-14 PROCEDURE — 1200000002 HC GENERAL ROOM WITH TELEMETRY DAILY

## 2024-09-14 PROCEDURE — 36415 COLL VENOUS BLD VENIPUNCTURE: CPT | Performed by: INTERNAL MEDICINE

## 2024-09-14 PROCEDURE — 85027 COMPLETE CBC AUTOMATED: CPT | Performed by: INTERNAL MEDICINE

## 2024-09-14 PROCEDURE — 2500000002 HC RX 250 W HCPCS SELF ADMINISTERED DRUGS (ALT 637 FOR MEDICARE OP, ALT 636 FOR OP/ED): Performed by: NURSE PRACTITIONER

## 2024-09-14 PROCEDURE — P9040 RBC LEUKOREDUCED IRRADIATED: HCPCS

## 2024-09-14 PROCEDURE — 2500000001 HC RX 250 WO HCPCS SELF ADMINISTERED DRUGS (ALT 637 FOR MEDICARE OP): Performed by: NURSE PRACTITIONER

## 2024-09-14 PROCEDURE — 2500000004 HC RX 250 GENERAL PHARMACY W/ HCPCS (ALT 636 FOR OP/ED): Performed by: PHYSICIAN ASSISTANT

## 2024-09-14 PROCEDURE — 2500000001 HC RX 250 WO HCPCS SELF ADMINISTERED DRUGS (ALT 637 FOR MEDICARE OP): Performed by: INTERNAL MEDICINE

## 2024-09-14 PROCEDURE — 36430 TRANSFUSION BLD/BLD COMPNT: CPT

## 2024-09-14 PROCEDURE — 99233 SBSQ HOSP IP/OBS HIGH 50: CPT | Performed by: INTERNAL MEDICINE

## 2024-09-14 RX ORDER — AMLODIPINE BESYLATE 10 MG/1
10 TABLET ORAL DAILY
Status: DISCONTINUED | OUTPATIENT
Start: 2024-09-14 | End: 2024-09-17 | Stop reason: HOSPADM

## 2024-09-14 ASSESSMENT — PAIN SCALES - GENERAL
PAINLEVEL_OUTOF10: 0 - NO PAIN

## 2024-09-14 ASSESSMENT — PAIN - FUNCTIONAL ASSESSMENT: PAIN_FUNCTIONAL_ASSESSMENT: 0-10

## 2024-09-14 ASSESSMENT — COGNITIVE AND FUNCTIONAL STATUS - GENERAL
WALKING IN HOSPITAL ROOM: A LITTLE
CLIMB 3 TO 5 STEPS WITH RAILING: A LITTLE
MOVING TO AND FROM BED TO CHAIR: A LITTLE
MOBILITY SCORE: 19
HELP NEEDED FOR BATHING: A LITTLE
TURNING FROM BACK TO SIDE WHILE IN FLAT BAD: A LITTLE
DAILY ACTIVITIY SCORE: 22
TOILETING: A LITTLE
STANDING UP FROM CHAIR USING ARMS: A LITTLE

## 2024-09-14 NOTE — H&P
History Of Present Illness  Rasheed Antonio is a 84 y.o. male with a past medical history of HTN, CKD III, GERD, dementia, anxiety, who presented to [facility] ED today via EMS from [facility] with a hemoglobin of 6.4. States he had a couple episodes of diarrhea that were very dark a couple days ago but none yesterday. Patient has dementia so unclear if patient is a good historian. Denies urinary symptoms or hematuria. Denies nausea or vomiting. Denies weakness, dizziness, chest pain, shortness of breath, abdominal pain, diarrhea, or constipation. Denies fever or chills.    ED Course: Hemodynamically stable. Afebrile. /81, HR 95, RR 16, 94%RA. EKG unavailable for my review. Labs: glucose 103. Chloride 109. BUN 57, creatinine 2.22, GFR 28. ALT 6. RBC 2.59, H&H 7.3/23.5. Neutrophils 6.57. UA ordered. Patient will be admitted and continue to follow under the care of Dr. Luc Sommers. I was asked to do H&P and place initial admission orders.      Past Medical History  Past Medical History:   Diagnosis Date    Personal history of other diseases of the respiratory system 09/28/2016    History of acute bronchitis    Personal history of other endocrine, nutritional and metabolic disease 01/13/2017    History of hyperlipidemia    Unspecified asthma with (acute) exacerbation ([facility]) 08/15/2018    Asthma exacerbation    Vitamin D deficiency, unspecified 02/11/2022    Vitamin D deficiency       Surgical History  History reviewed. No pertinent surgical history.     Social History  He reports that he has never smoked. He has never used smokeless tobacco. He reports that he does not currently use alcohol. He reports that he does not use drugs.    Family History  No family history on file.     Allergies  Grass pollen and Penicillins    Review of Systems   10 point ROS reviewed and otherwise negative except what is stated in HPI.   Physical Exam  Constitutional:       Appearance: Normal appearance.   HENT:  "     Head: Normocephalic and atraumatic.      Nose: Nose normal.      Mouth/Throat:      Mouth: Mucous membranes are moist.      Pharynx: Oropharynx is clear.   Eyes:      Extraocular Movements: Extraocular movements intact.      Conjunctiva/sclera: Conjunctivae normal.      Pupils: Pupils are equal, round, and reactive to light.   Cardiovascular:      Rate and Rhythm: Normal rate and regular rhythm.      Pulses: Normal pulses.      Heart sounds: Normal heart sounds.   Pulmonary:      Effort: Pulmonary effort is normal.      Breath sounds: Normal breath sounds.   Abdominal:      General: Abdomen is flat. Bowel sounds are normal.      Palpations: Abdomen is soft.   Musculoskeletal:         General: Normal range of motion.      Cervical back: Normal range of motion and neck supple.   Skin:     General: Skin is warm and dry.      Capillary Refill: Capillary refill takes less than 2 seconds.   Neurological:      General: No focal deficit present.      Mental Status: He is alert and oriented to person, place, and time.   Psychiatric:         Mood and Affect: Mood normal.         Behavior: Behavior normal.         Thought Content: Thought content normal.         Judgment: Judgment normal.          Last Recorded Vitals  Blood pressure 137/74, pulse 80, temperature 37.4 °C (99.3 °F), temperature source Temporal, resp. rate 16, height 1.626 m (5' 4\"), weight 59 kg (130 lb), SpO2 99%.    Relevant Results  Results for orders placed or performed during the hospital encounter of 09/12/24 (from the past 24 hour(s))   Urinalysis with Reflex Microscopic   Result Value Ref Range    Color, Urine Light-Yellow Light-Yellow, Yellow, Dark-Yellow    Appearance, Urine Clear Clear    Specific Gravity, Urine 1.013 1.005 - 1.035    pH, Urine 5.0 5.0, 5.5, 6.0, 6.5, 7.0, 7.5, 8.0    Protein, Urine NEGATIVE NEGATIVE, 10 (TRACE), 20 (TRACE) mg/dL    Glucose, Urine Normal Normal mg/dL    Blood, Urine NEGATIVE NEGATIVE    Ketones, Urine NEGATIVE " NEGATIVE mg/dL    Bilirubin, Urine NEGATIVE NEGATIVE    Urobilinogen, Urine Normal Normal mg/dL    Nitrite, Urine NEGATIVE NEGATIVE    Leukocyte Esterase, Urine NEGATIVE NEGATIVE   CBC   Result Value Ref Range    WBC 9.0 4.4 - 11.3 x10*3/uL    nRBC 0.0 0.0 - 0.0 /100 WBCs    RBC 2.24 (L) 4.50 - 5.90 x10*6/uL    Hemoglobin 6.1 (LL) 13.5 - 17.5 g/dL    Hematocrit 20.3 (L) 41.0 - 52.0 %    MCV 91 80 - 100 fL    MCH 27.2 26.0 - 34.0 pg    MCHC 30.0 (L) 32.0 - 36.0 g/dL    RDW 12.8 11.5 - 14.5 %    Platelets 272 150 - 450 x10*3/uL   Basic metabolic panel   Result Value Ref Range    Glucose 92 74 - 99 mg/dL    Sodium 141 136 - 145 mmol/L    Potassium 3.6 3.5 - 5.3 mmol/L    Chloride 113 (H) 98 - 107 mmol/L    Bicarbonate 22 21 - 32 mmol/L    Anion Gap 10 10 - 20 mmol/L    Urea Nitrogen 56 (H) 6 - 23 mg/dL    Creatinine 2.15 (H) 0.50 - 1.30 mg/dL    eGFR 29 (L) >60 mL/min/1.73m*2    Calcium 8.8 8.6 - 10.3 mg/dL   Prepare RBC: 1 Units   Result Value Ref Range    PRODUCT CODE L2961G99     Unit Number [id number]     Unit ABO A     Unit RH NEG     XM INTEP COMP     Dispense Status TR     Blood Expiration Date 9/23/2024 11:59:00 PM EDT     PRODUCT BLOOD TYPE 0600     UNIT VOLUME 292    Hemoglobin and Hematocrit, Blood   Result Value Ref Range    Hemoglobin 8.1 (L) 13.5 - 17.5 g/dL    Hematocrit 25.4 (L) 41.0 - 52.0 %   Hemoglobin and Hematocrit, Blood   Result Value Ref Range    Hemoglobin 8.0 (L) 13.5 - 17.5 g/dL    Hematocrit 25.7 (L) 41.0 - 52.0 %        Assessment/Plan   Assessment & Plan  Anemia    84 year old male with a past medical history of HTN, CKD III, GERD, dementia, anxiety, who presented to [facility] ED today via EMS from [facility] with a hemoglobin of 6.4. States he had a couple episodes of diarrhea that were very dark a couple days ago but none yesterday. GI consulted. Patient to be admitted to hospital for further medical management.     #?GI bleed (patient reported dark stools)  #Acute on  chronic anemia  #DILMA on CKD  -Admit to OBS/RMF to Dr. Luc Sommers   -GI consult and appreciate recs  -H&H 7.3/23.5. Transfuse pRBC if Hgb<7. Will trend H&H. -- transfused 2 units pRBCs  -Blood consent signed and in computer  -Check hemoccult   -LR@75ml/hr  -NPO. Attending to advance as tolerated.  -CXR ordered  -UA ordered and pending   -Repeat labs (type and screen ordered) in AM      Chronic conditions  #HTN, GERD, Dementia, anxiety  -Continue home meds as appropriate when nursing completes home med rec.   -PT/OT/SW  -DNAR/DNI    #DVT prophylaxis   Hold chemical prophylaxis d/t suspected GI bleed  Up in chair TID   SCD's     I spent 35 minutes in the professional and overall care of this patient.    Luc Sommers, DO

## 2024-09-14 NOTE — PROGRESS NOTES
"Rasheed Antonio is a 84 y.o. male on day 0 of admission presenting with Anemia.    Subjective   No events overnight. Patient seen and examined at bedside. He is alert and oriented to name and year.       Objective     Physical Exam  Constitutional:       Appearance: Normal appearance.   HENT:      Head: Normocephalic and atraumatic.      Nose: Nose normal.      Mouth/Throat:      Mouth: Mucous membranes are moist.      Pharynx: Oropharynx is clear.   Eyes:      Extraocular Movements: Extraocular movements intact.      Conjunctiva/sclera: Conjunctivae normal.      Pupils: Pupils are equal, round, and reactive to light.   Cardiovascular:      Rate and Rhythm: Normal rate and regular rhythm.      Pulses: Normal pulses.      Heart sounds: Normal heart sounds.   Pulmonary:      Effort: Pulmonary effort is normal.      Breath sounds: Normal breath sounds.   Abdominal:      General: Abdomen is flat. Bowel sounds are normal.      Palpations: Abdomen is soft.   Musculoskeletal:         General: Normal range of motion.      Cervical back: Normal range of motion and neck supple.   Skin:     General: Skin is warm and dry.      Capillary Refill: Capillary refill takes less than 2 seconds.   Neurological:      General: No focal deficit present.      Mental Status: He is alert and oriented to person, place, and time.   Psychiatric:         Mood and Affect: Mood normal.         Behavior: Behavior normal.         Thought Content: Thought content normal.         Judgment: Judgment normal.      Last Recorded Vitals  Blood pressure 161/90, pulse 85, temperature 36.3 °C (97.3 °F), temperature source Temporal, resp. rate 20, height 1.626 m (5' 4\"), weight 59 kg (130 lb), SpO2 95%.  Intake/Output last 3 Shifts:  I/O last 3 completed shifts:  In: 942.1 (16 mL/kg) [I.V.:753.8 (12.8 mL/kg); Blood:188.3]  Out: 100 (1.7 mL/kg) [Urine:100 (0 mL/kg/hr)]  Weight: 59 kg     Relevant Results                              Assessment/Plan   Assessment " & Plan  Anemia    Dark stools    84 year old male with a past medical history of HTN, CKD III, GERD, dementia, anxiety, who presented to [facility] ED today via EMS from [facility] with a hemoglobin of 6.4. States he had a couple episodes of diarrhea that were very dark a couple days ago but none yesterday. GI consulted. Patient to be admitted to hospital for further medical management.      #?GI bleed (patient reported dark stools)  #Acute on chronic anemia  #DILMA on CKD  -Admit to OBS/RMF to Dr. Luc Sommers   -GI consult and appreciate recs  -H&H 7.3/23.5. Transfuse pRBC if Hgb<7. Will trend H&H. -- transfused 2 units pRBCs  -Blood consent signed and in computer  -Check hemoccult   -LR@75ml/hr  -NPO. Attending to advance as tolerated.  -CXR ordered  -UA ordered and pending   -Repeat labs (type and screen ordered) in AM      Chronic conditions  #HTN, GERD, Dementia, anxiety  -Continue home meds as appropriate when nursing completes home med rec.   -PT/OT/SW  -DNAR/DNI     #DVT prophylaxis   Hold chemical prophylaxis d/t suspected GI bleed  Up in chair TID   SCD's      9/14: Hb 7.1, will transfuse an additional unit pRBCs as his anemia if contributing to DILMA. Continue IVF. Adjust BP meds to avoid nephrotoxins. Repeat labs in the morning.        I spent 55 minutes in the professional and overall care of this patient.      Luc Sommers, DO

## 2024-09-15 VITALS
DIASTOLIC BLOOD PRESSURE: 91 MMHG | RESPIRATION RATE: 20 BRPM | HEART RATE: 83 BPM | WEIGHT: 130 LBS | OXYGEN SATURATION: 96 % | SYSTOLIC BLOOD PRESSURE: 155 MMHG | TEMPERATURE: 98.1 F | BODY MASS INDEX: 22.2 KG/M2 | HEIGHT: 64 IN

## 2024-09-15 LAB
ANION GAP SERPL CALC-SCNC: 13 MMOL/L (ref 10–20)
BUN SERPL-MCNC: 31 MG/DL (ref 6–23)
CALCIUM SERPL-MCNC: 9.3 MG/DL (ref 8.6–10.3)
CHLORIDE SERPL-SCNC: 109 MMOL/L (ref 98–107)
CO2 SERPL-SCNC: 22 MMOL/L (ref 21–32)
CREAT SERPL-MCNC: 2.07 MG/DL (ref 0.5–1.3)
EGFRCR SERPLBLD CKD-EPI 2021: 31 ML/MIN/1.73M*2
ERYTHROCYTE [DISTWIDTH] IN BLOOD BY AUTOMATED COUNT: 14 % (ref 11.5–14.5)
GLUCOSE SERPL-MCNC: 90 MG/DL (ref 74–99)
HCT VFR BLD AUTO: 30 % (ref 41–52)
HGB BLD-MCNC: 9.2 G/DL (ref 13.5–17.5)
MCH RBC QN AUTO: 27.4 PG (ref 26–34)
MCHC RBC AUTO-ENTMCNC: 30.7 G/DL (ref 32–36)
MCV RBC AUTO: 89 FL (ref 80–100)
NRBC BLD-RTO: 0 /100 WBCS (ref 0–0)
PLATELET # BLD AUTO: 288 X10*3/UL (ref 150–450)
POTASSIUM SERPL-SCNC: 3.7 MMOL/L (ref 3.5–5.3)
RBC # BLD AUTO: 3.36 X10*6/UL (ref 4.5–5.9)
SODIUM SERPL-SCNC: 140 MMOL/L (ref 136–145)
WBC # BLD AUTO: 10.3 X10*3/UL (ref 4.4–11.3)

## 2024-09-15 PROCEDURE — 2500000004 HC RX 250 GENERAL PHARMACY W/ HCPCS (ALT 636 FOR OP/ED)

## 2024-09-15 PROCEDURE — 2500000001 HC RX 250 WO HCPCS SELF ADMINISTERED DRUGS (ALT 637 FOR MEDICARE OP): Performed by: INTERNAL MEDICINE

## 2024-09-15 PROCEDURE — 85027 COMPLETE CBC AUTOMATED: CPT | Performed by: INTERNAL MEDICINE

## 2024-09-15 PROCEDURE — 80048 BASIC METABOLIC PNL TOTAL CA: CPT | Performed by: INTERNAL MEDICINE

## 2024-09-15 PROCEDURE — 1100000001 HC PRIVATE ROOM DAILY

## 2024-09-15 PROCEDURE — 2500000002 HC RX 250 W HCPCS SELF ADMINISTERED DRUGS (ALT 637 FOR MEDICARE OP, ALT 636 FOR OP/ED): Performed by: NURSE PRACTITIONER

## 2024-09-15 PROCEDURE — 36415 COLL VENOUS BLD VENIPUNCTURE: CPT | Performed by: INTERNAL MEDICINE

## 2024-09-15 PROCEDURE — 99232 SBSQ HOSP IP/OBS MODERATE 35: CPT | Performed by: INTERNAL MEDICINE

## 2024-09-15 PROCEDURE — 2500000004 HC RX 250 GENERAL PHARMACY W/ HCPCS (ALT 636 FOR OP/ED): Performed by: PHYSICIAN ASSISTANT

## 2024-09-15 ASSESSMENT — COGNITIVE AND FUNCTIONAL STATUS - GENERAL
MOBILITY SCORE: 20
WALKING IN HOSPITAL ROOM: A LITTLE
TOILETING: A LITTLE
DAILY ACTIVITIY SCORE: 20
MOVING TO AND FROM BED TO CHAIR: A LITTLE
DRESSING REGULAR LOWER BODY CLOTHING: A LITTLE
HELP NEEDED FOR BATHING: A LITTLE
MOVING TO AND FROM BED TO CHAIR: A LITTLE
STANDING UP FROM CHAIR USING ARMS: A LITTLE
PERSONAL GROOMING: A LITTLE
MOBILITY SCORE: 20
HELP NEEDED FOR BATHING: A LITTLE
CLIMB 3 TO 5 STEPS WITH RAILING: A LITTLE
DRESSING REGULAR LOWER BODY CLOTHING: A LITTLE
STANDING UP FROM CHAIR USING ARMS: A LITTLE
TOILETING: A LITTLE
WALKING IN HOSPITAL ROOM: A LITTLE
CLIMB 3 TO 5 STEPS WITH RAILING: A LITTLE

## 2024-09-15 ASSESSMENT — PAIN - FUNCTIONAL ASSESSMENT
PAIN_FUNCTIONAL_ASSESSMENT: 0-10

## 2024-09-15 ASSESSMENT — PAIN SCALES - GENERAL
PAINLEVEL_OUTOF10: 0 - NO PAIN

## 2024-09-16 PROBLEM — R53.1 WEAKNESS: Status: ACTIVE | Noted: 2024-09-16

## 2024-09-16 LAB
ANION GAP SERPL CALC-SCNC: 11 MMOL/L (ref 10–20)
BUN SERPL-MCNC: 28 MG/DL (ref 6–23)
CALCIUM SERPL-MCNC: 9.3 MG/DL (ref 8.6–10.3)
CHLORIDE SERPL-SCNC: 107 MMOL/L (ref 98–107)
CO2 SERPL-SCNC: 26 MMOL/L (ref 21–32)
CREAT SERPL-MCNC: 2.01 MG/DL (ref 0.5–1.3)
EGFRCR SERPLBLD CKD-EPI 2021: 32 ML/MIN/1.73M*2
ERYTHROCYTE [DISTWIDTH] IN BLOOD BY AUTOMATED COUNT: 13.7 % (ref 11.5–14.5)
GLUCOSE SERPL-MCNC: 101 MG/DL (ref 74–99)
HCT VFR BLD AUTO: 29.7 % (ref 41–52)
HGB BLD-MCNC: 9.2 G/DL (ref 13.5–17.5)
MCH RBC QN AUTO: 27.5 PG (ref 26–34)
MCHC RBC AUTO-ENTMCNC: 31 G/DL (ref 32–36)
MCV RBC AUTO: 89 FL (ref 80–100)
NRBC BLD-RTO: 0 /100 WBCS (ref 0–0)
PLATELET # BLD AUTO: 294 X10*3/UL (ref 150–450)
POTASSIUM SERPL-SCNC: 3.7 MMOL/L (ref 3.5–5.3)
RBC # BLD AUTO: 3.35 X10*6/UL (ref 4.5–5.9)
SODIUM SERPL-SCNC: 140 MMOL/L (ref 136–145)
WBC # BLD AUTO: 7.9 X10*3/UL (ref 4.4–11.3)

## 2024-09-16 PROCEDURE — 2500000004 HC RX 250 GENERAL PHARMACY W/ HCPCS (ALT 636 FOR OP/ED)

## 2024-09-16 PROCEDURE — 2500000001 HC RX 250 WO HCPCS SELF ADMINISTERED DRUGS (ALT 637 FOR MEDICARE OP): Performed by: INTERNAL MEDICINE

## 2024-09-16 PROCEDURE — 36415 COLL VENOUS BLD VENIPUNCTURE: CPT | Performed by: INTERNAL MEDICINE

## 2024-09-16 PROCEDURE — 2500000004 HC RX 250 GENERAL PHARMACY W/ HCPCS (ALT 636 FOR OP/ED): Performed by: PHYSICIAN ASSISTANT

## 2024-09-16 PROCEDURE — 80048 BASIC METABOLIC PNL TOTAL CA: CPT | Performed by: INTERNAL MEDICINE

## 2024-09-16 PROCEDURE — 2500000002 HC RX 250 W HCPCS SELF ADMINISTERED DRUGS (ALT 637 FOR MEDICARE OP, ALT 636 FOR OP/ED): Performed by: NURSE PRACTITIONER

## 2024-09-16 PROCEDURE — 1100000001 HC PRIVATE ROOM DAILY

## 2024-09-16 PROCEDURE — 99232 SBSQ HOSP IP/OBS MODERATE 35: CPT | Performed by: INTERNAL MEDICINE

## 2024-09-16 PROCEDURE — 85027 COMPLETE CBC AUTOMATED: CPT | Performed by: INTERNAL MEDICINE

## 2024-09-16 RX ORDER — PANTOPRAZOLE SODIUM 40 MG/1
40 TABLET, DELAYED RELEASE ORAL 2 TIMES DAILY
Qty: 60 TABLET | Refills: 1 | Status: SHIPPED | OUTPATIENT
Start: 2024-09-16 | End: 2024-09-17

## 2024-09-16 RX ORDER — AMLODIPINE BESYLATE 10 MG/1
10 TABLET ORAL DAILY
Qty: 90 TABLET | Refills: 0 | Status: SHIPPED | OUTPATIENT
Start: 2024-09-17 | End: 2024-09-17

## 2024-09-16 ASSESSMENT — COGNITIVE AND FUNCTIONAL STATUS - GENERAL
MOVING TO AND FROM BED TO CHAIR: A LITTLE
HELP NEEDED FOR BATHING: A LITTLE
TURNING FROM BACK TO SIDE WHILE IN FLAT BAD: A LITTLE
TOILETING: A LITTLE
CLIMB 3 TO 5 STEPS WITH RAILING: A LOT
PERSONAL GROOMING: A LITTLE
STANDING UP FROM CHAIR USING ARMS: A LITTLE
MOBILITY SCORE: 18
DAILY ACTIVITIY SCORE: 21
WALKING IN HOSPITAL ROOM: A LITTLE

## 2024-09-16 ASSESSMENT — PAIN SCALES - GENERAL
PAINLEVEL_OUTOF10: 0 - NO PAIN
PAINLEVEL_OUTOF10: 0 - NO PAIN

## 2024-09-16 ASSESSMENT — PAIN - FUNCTIONAL ASSESSMENT: PAIN_FUNCTIONAL_ASSESSMENT: 0-10

## 2024-09-16 NOTE — PROGRESS NOTES
"Rasheed Antonio is a 84 y.o. male on day 1 of admission presenting with Anemia.    Subjective   No events overnight. Patient seen and examined at bedside. He is alert and oriented x3. No complaints.        Objective     Physical Exam  Constitutional:       Appearance: Normal appearance.   HENT:      Head: Normocephalic and atraumatic.      Nose: Nose normal.      Mouth/Throat:      Mouth: Mucous membranes are moist.      Pharynx: Oropharynx is clear.   Eyes:      Extraocular Movements: Extraocular movements intact.      Conjunctiva/sclera: Conjunctivae normal.      Pupils: Pupils are equal, round, and reactive to light.   Cardiovascular:      Rate and Rhythm: Normal rate and regular rhythm.      Pulses: Normal pulses.      Heart sounds: Normal heart sounds.   Pulmonary:      Effort: Pulmonary effort is normal.      Breath sounds: Normal breath sounds.   Abdominal:      General: Abdomen is flat. Bowel sounds are normal.      Palpations: Abdomen is soft.   Musculoskeletal:         General: Normal range of motion.      Cervical back: Normal range of motion and neck supple.   Skin:     General: Skin is warm and dry.      Capillary Refill: Capillary refill takes less than 2 seconds.   Neurological:      General: No focal deficit present.      Mental Status: He is alert and oriented to person, place, and time.   Psychiatric:         Mood and Affect: Mood normal.         Behavior: Behavior normal.         Thought Content: Thought content normal.         Judgment: Judgment normal.      Last Recorded Vitals  Blood pressure 151/74, pulse 84, temperature 37.2 °C (99 °F), resp. rate 18, height 1.626 m (5' 4\"), weight 59 kg (130 lb), SpO2 100%.  Intake/Output last 3 Shifts:  I/O last 3 completed shifts:  In: 4183.5 (70.9 mL/kg) [I.V.:3827.5 (64.9 mL/kg); Blood:356]  Out: 1350 (22.9 mL/kg) [Urine:1350 (0.6 mL/kg/hr)]  Weight: 59 kg     Relevant Results                              Assessment/Plan   Assessment & " Plan  Anemia    Dark stools    84 year old male with a past medical history of HTN, CKD III, GERD, dementia, anxiety, who presented to [facility] ED today via EMS from [facility] with a hemoglobin of 6.4. States he had a couple episodes of diarrhea that were very dark a couple days ago but none yesterday. GI consulted. Patient to be admitted to hospital for further medical management.      #?GI bleed (patient reported dark stools)  #Acute on chronic anemia  #DILMA on CKD  -Admit to OBS/RMF to Dr. Luc Sommers   -GI consult and appreciate recs  -H&H 7.3/23.5. Transfuse pRBC if Hgb<7. Will trend H&H. -- transfused 2 units pRBCs  -Blood consent signed and in computer  -Check hemoccult   -LR@75ml/hr  -NPO. Attending to advance as tolerated.  -CXR ordered  -UA ordered and pending   -Repeat labs (type and screen ordered) in AM      Chronic conditions  #HTN, GERD, Dementia, anxiety  -Continue home meds as appropriate when nursing completes home med rec.   -PT/OT/SW  -DNAR/DNI     #DVT prophylaxis   Hold chemical prophylaxis d/t suspected GI bleed  Up in chair TID   SCD's      9/14: Hb 7.1, will transfuse an additional unit pRBCs as his anemia if contributing to DILMA. Continue IVF. Adjust BP meds to avoid nephrotoxins. Repeat labs in the morning.     9/15: Hb up to 9.2. Creatinine slowly improving from 2.1 down to 2.0. Continue IVF. Potential discharge tomorrow if Hb stable and Cr continues to improve.          I spent 55 minutes in the professional and overall care of this patient.      Luc Sommers, DO

## 2024-09-16 NOTE — PROGRESS NOTES
09/16/24 1615   Discharge Planning   Living Arrangements Other (Comment)   Support Systems Children   Assistance Needed iADLs, transportation   Type of Residence Assisted living   Care Facility Name [facility] [address]    Expected Discharge Disposition Home   Does the patient need discharge transport arranged? Yes     Patient with discharge orders, returning to [facility].  He is agreeable to [facility] for therapies; referral sent to [facility] as [facility] contracts with them.  HCOs attached to referral; transportation to be arranged for 1900 as patient will need to be able to tolerate regular diet before he can leave.  RN aware.  Geraldo NICOLE TCC  [id number]:  Patient not coming up in round trip; transportation set up to be arranged in the morning as patient has not eaten regular diet yet.

## 2024-09-16 NOTE — DISCHARGE INSTRUCTIONS
Patient will need Renal Function Panel and Complete blood Count in one week. Send results to PCP  Continue Pantoprazole 40 mg once daily for 2 months for treatment of Grade D esophagitis  Follow up with PCP for monitoring/management of anemia and renal function.  Needs repeat EGD in 2 months, due 11/12/2024  -> Call office to schedule.      EGD 9/13/2024 results:  ? Grade D esophagitis in the middle third of the esophagus, lower third of the esophagus and GE junction  ? Medium type I hiatal hernia  ? The cardia, fundus of the stomach, body of the stomach and antrum appeared normal.  ? The duodenal bulb and 2nd part of the duodenum appeared normal.

## 2024-09-16 NOTE — CARE PLAN
I was asked to discharge patient on behalf of Dr Luc Sommers    Patient was evaluated at bedside.  Stable, no complaints at this time.  He feels ready to discharge back to assisted living.  He is a poor historian due to dementia.   Patient has been on a full liquid diet.  This will be advanced prior to his discharge.  I spoke with the nursing staff and they will arrange for transport after patient eats dinner this evening.  Chart was reviewed,.  Patient hemodynamically stable, on presentation patient did have an DILMA and was also anemic.  Creatinine has improved but not back to baseline.  Will continue to hold hydrochlorothiazide and ARB on discharge, Norvasc 10 mg once daily ordered instead and prescription sent to the pharmacy.  He is status post transfusion of 1 unit PRBCs and underwent EGD on 9/13/2024 which showed grade D esophagitis.  He was discharged on 40 mg pantoprazole by mouth twice daily with plan for repeat EGD in 2 months per gastroenterology.  Patient needs to follow-up with his PCP in 1 week for hospital follow-up. CBC and renal function panel to be drawn in 1 week.  Results to be sent to PCP.    Fabien Musa, APRN-CNP

## 2024-09-17 ENCOUNTER — PHARMACY VISIT (OUTPATIENT)
Dept: PHARMACY | Facility: CLINIC | Age: 84
End: 2024-09-17
Payer: MEDICARE

## 2024-09-17 VITALS
DIASTOLIC BLOOD PRESSURE: 86 MMHG | WEIGHT: 130 LBS | OXYGEN SATURATION: 94 % | RESPIRATION RATE: 16 BRPM | HEIGHT: 64 IN | BODY MASS INDEX: 22.2 KG/M2 | SYSTOLIC BLOOD PRESSURE: 174 MMHG | TEMPERATURE: 99 F | HEART RATE: 83 BPM

## 2024-09-17 LAB
ANION GAP SERPL CALC-SCNC: 12 MMOL/L (ref 10–20)
BUN SERPL-MCNC: 28 MG/DL (ref 6–23)
CALCIUM SERPL-MCNC: 8.8 MG/DL (ref 8.6–10.3)
CHLORIDE SERPL-SCNC: 107 MMOL/L (ref 98–107)
CO2 SERPL-SCNC: 25 MMOL/L (ref 21–32)
CREAT SERPL-MCNC: 2.17 MG/DL (ref 0.5–1.3)
EGFRCR SERPLBLD CKD-EPI 2021: 29 ML/MIN/1.73M*2
ERYTHROCYTE [DISTWIDTH] IN BLOOD BY AUTOMATED COUNT: 13.8 % (ref 11.5–14.5)
GLUCOSE SERPL-MCNC: 96 MG/DL (ref 74–99)
HCT VFR BLD AUTO: 29.5 % (ref 41–52)
HGB BLD-MCNC: 9.1 G/DL (ref 13.5–17.5)
MCH RBC QN AUTO: 28 PG (ref 26–34)
MCHC RBC AUTO-ENTMCNC: 30.8 G/DL (ref 32–36)
MCV RBC AUTO: 91 FL (ref 80–100)
NRBC BLD-RTO: 0 /100 WBCS (ref 0–0)
PLATELET # BLD AUTO: 288 X10*3/UL (ref 150–450)
POTASSIUM SERPL-SCNC: 3.8 MMOL/L (ref 3.5–5.3)
RBC # BLD AUTO: 3.25 X10*6/UL (ref 4.5–5.9)
SODIUM SERPL-SCNC: 140 MMOL/L (ref 136–145)
WBC # BLD AUTO: 8.6 X10*3/UL (ref 4.4–11.3)

## 2024-09-17 PROCEDURE — 99238 HOSP IP/OBS DSCHRG MGMT 30/<: CPT | Performed by: INTERNAL MEDICINE

## 2024-09-17 PROCEDURE — 2500000002 HC RX 250 W HCPCS SELF ADMINISTERED DRUGS (ALT 637 FOR MEDICARE OP, ALT 636 FOR OP/ED): Performed by: NURSE PRACTITIONER

## 2024-09-17 PROCEDURE — 80048 BASIC METABOLIC PNL TOTAL CA: CPT | Performed by: INTERNAL MEDICINE

## 2024-09-17 PROCEDURE — 2500000004 HC RX 250 GENERAL PHARMACY W/ HCPCS (ALT 636 FOR OP/ED): Performed by: PHYSICIAN ASSISTANT

## 2024-09-17 PROCEDURE — 85027 COMPLETE CBC AUTOMATED: CPT | Performed by: INTERNAL MEDICINE

## 2024-09-17 PROCEDURE — 2500000001 HC RX 250 WO HCPCS SELF ADMINISTERED DRUGS (ALT 637 FOR MEDICARE OP): Performed by: INTERNAL MEDICINE

## 2024-09-17 PROCEDURE — 36415 COLL VENOUS BLD VENIPUNCTURE: CPT | Performed by: INTERNAL MEDICINE

## 2024-09-17 PROCEDURE — RXMED WILLOW AMBULATORY MEDICATION CHARGE

## 2024-09-17 PROCEDURE — 97535 SELF CARE MNGMENT TRAINING: CPT | Mod: GO

## 2024-09-17 RX ORDER — PANTOPRAZOLE SODIUM 40 MG/1
40 TABLET, DELAYED RELEASE ORAL 2 TIMES DAILY
Qty: 60 TABLET | Refills: 0 | Status: SHIPPED | OUTPATIENT
Start: 2024-09-17 | End: 2024-10-17

## 2024-09-17 RX ORDER — AMLODIPINE BESYLATE 10 MG/1
10 TABLET ORAL DAILY
Qty: 30 TABLET | Refills: 0 | Status: SHIPPED | OUTPATIENT
Start: 2024-09-17 | End: 2024-10-17

## 2024-09-17 ASSESSMENT — COGNITIVE AND FUNCTIONAL STATUS - GENERAL
CLIMB 3 TO 5 STEPS WITH RAILING: A LOT
MOVING TO AND FROM BED TO CHAIR: A LITTLE
DRESSING REGULAR LOWER BODY CLOTHING: A LITTLE
MOBILITY SCORE: 18
TURNING FROM BACK TO SIDE WHILE IN FLAT BAD: A LITTLE
DAILY ACTIVITIY SCORE: 19
HELP NEEDED FOR BATHING: A LITTLE
WALKING IN HOSPITAL ROOM: A LITTLE
TOILETING: A LITTLE
DRESSING REGULAR UPPER BODY CLOTHING: A LITTLE
DAILY ACTIVITIY SCORE: 20
PERSONAL GROOMING: A LITTLE
TOILETING: A LITTLE
EATING MEALS: A LITTLE
STANDING UP FROM CHAIR USING ARMS: A LITTLE
HELP NEEDED FOR BATHING: A LITTLE
PERSONAL GROOMING: A LITTLE

## 2024-09-17 ASSESSMENT — PAIN SCALES - GENERAL
PAINLEVEL_OUTOF10: 0 - NO PAIN

## 2024-09-17 ASSESSMENT — ACTIVITIES OF DAILY LIVING (ADL): HOME_MANAGEMENT_TIME_ENTRY: 32

## 2024-09-17 NOTE — CARE PLAN
The patient's goals for the shift include comfort    The clinical goals for the shift include pt to remain free of falls during this shift    Over the shift, the patient did not make progress toward the following goals. Barriers to progression include n/a. Recommendations to address these barriers include n/a.

## 2024-09-17 NOTE — PROGRESS NOTES
Occupational Therapy    OT Treatment    Patient Name: Rasheed Antonio  MRN: [id number]  Department: [facility]  Room: [address] [place] [address]-A  Today's Date: 9/17/2024  Time Calculation  Start Time: 1030  Stop Time: 1102  Time Calculation (min): 32 min        Assessment:        Plan:  Treatment Interventions: ADL retraining, UE strengthening/ROM, Functional transfer training, Neuromuscular reeducation, Compensatory technique education  OT Frequency: 3 times per week  OT Discharge Recommendations: Moderate intensity level of continued care, 24 hr supervision due to cognition  OT - OK to Discharge: Yes  Treatment Interventions: ADL retraining, UE strengthening/ROM, Functional transfer training, Neuromuscular reeducation, Compensatory technique education    Subjective   Previous Visit Info:     General:  General  Prior to Session Communication: Bedside nurse  Patient Position Received: Bed, 2 rail up, Alarm on  General Comment: pt agreeable to working with therapy, pt reported he is fearful of falling and that when he does loose his balance he falls forward.  pt confessed he uses the walls for support when he walks, due to his fear of falling.  Precautions:  Precautions Comment: falls, external cathedar.    Pain:  Pain Assessment  0-10 (Numeric) Pain Score: 0 - No pain    Objective    ctivities of Daily Living:      Grooming  Grooming Level of Assistance:  (pt able to clean dentures standing sink level x 4 minutes in bathroom with sink support for balance and cga.)    UE Bathing  UE Bathing Level of Assistance:  (pt able to bath ub seated with sponge wipes with verbal cues for initiation of task and throughness.  sba.)    LE Bathing  LE Bathing Level of Assistance:  (pt able to complete lb hygiene up to table support for balance with cga)    Functional Standing Tolerance:  Activity: pt tolerated lt dyn standing for adls x 4.5 lmintues with cga  Bed Mobility/Transfers: Bed Mobility  Bed Mobility:  (supine to sit at edge of bed with  cga.)    Transfers  Transfer:  (sit to stand from bed surface with cga.)    Functional Mobility:  Functional Mobility  Functional Mobility Performed:  (pt able to ambulate from bed to bath 6 x 6 with use of iv pole for support with min assist.)      Outcome Measures:[facility] Daily Activity  Putting on and taking off regular lower body clothing: A little  Bathing (including washing, rinsing, drying): A little  Putting on and taking off regular upper body clothing: A little  Toileting, which includes using toilet, bedpan or urinal: A little  Taking care of personal grooming such as brushing teeth: A little  Eating Meals: None  Daily Activity - Total Score: 19        Education Documentation  ADL Training, taught by Janie Levin, OT at 9/17/2024 12:53 PM.  Learner: Patient  Readiness: Acceptance  Method: Explanation, Demonstration  Response: Needs Reinforcement    Education Comments  No comments found.        OP EDUCATION:       Goals:  Encounter Problems       Encounter Problems (Active)       OT Goals       Tolerate 15 minutes UE therex with rest periods prn to promote increased activity tolerance for ADLs  (Progressing)       Start:  09/13/24    Expected End:  09/27/24            Increase LE bathing & dressing to supervision with adaptive equipment as needed.  (Progressing)       Start:  09/13/24    Expected End:  09/27/24            Increase functional transfers to/from bed, chair & commode to supervision with DME for safety  (Progressing)       Start:  09/13/24    Expected End:  09/27/24            Demonstrate compliance to energy conservation techs and safety techs during ADLs   (Progressing)       Start:  09/13/24    Expected End:  09/27/24

## 2024-09-17 NOTE — DISCHARGE SUMMARY
Discharge Diagnosis  Anemia    Issues Requiring Follow-Up  DILMA, CKD, anemia, esophagitis     Test Results Pending At Discharge  Pending Labs       No current pending labs.            Hospital Course   84 year old male with a past medical history of HTN, CKD III, GERD, dementia, anxiety, who presented to [facility] ED today via EMS from [facility] with a hemoglobin of 6.4. States he had a couple episodes of diarrhea that were very dark a couple days ago but none yesterday. GI consulted. Patient to be admitted to hospital for further medical management.      #?GI bleed (patient reported dark stools)  #Acute on chronic anemia  #DILMA on CKD  -Admit to OBS/RMF to Dr. Luc Sommers   -GI consult and appreciate recs  -H&H 7.3/23.5. Transfuse pRBC if Hgb<7. Will trend H&H. -- transfused 2 units pRBCs  -Blood consent signed and in computer  -Check hemoccult   -LR@75ml/hr  -NPO. Attending to advance as tolerated.  -CXR ordered  -UA ordered and pending   -Repeat labs (type and screen ordered) in AM      Chronic conditions  #HTN, GERD, Dementia, anxiety  -Continue home meds as appropriate when nursing completes home med rec.   -PT/OT/SW  -DNAR/DNI     #DVT prophylaxis   Hold chemical prophylaxis d/t suspected GI bleed  Up in chair TID   SCD's      9/14: Hb 7.1, will transfuse an additional unit pRBCs as his anemia if contributing to DILMA. Continue IVF. Adjust BP meds to avoid nephrotoxins. Repeat labs in the morning.      9/15: Hb up to 9.2. Creatinine slowly improving from 2.1 down to 2.0. Continue IVF. Potential discharge tomorrow if Hb stable and Cr continues to improve.      9/16: Hb stable. Creatinine improving. Patient medically ready for discharge.      9/17: Hb and Cr stable. Patient discharged.     Pertinent Physical Exam At Time of Discharge  Physical Exam  Constitutional:       Appearance: Normal appearance.   HENT:      Head: Normocephalic and atraumatic.      Nose: Nose normal.      Mouth/Throat:       Mouth: Mucous membranes are moist.      Pharynx: Oropharynx is clear.   Eyes:      Extraocular Movements: Extraocular movements intact.      Conjunctiva/sclera: Conjunctivae normal.      Pupils: Pupils are equal, round, and reactive to light.   Cardiovascular:      Rate and Rhythm: Normal rate and regular rhythm.      Pulses: Normal pulses.      Heart sounds: Normal heart sounds.   Pulmonary:      Effort: Pulmonary effort is normal.      Breath sounds: Normal breath sounds.   Abdominal:      General: Abdomen is flat. Bowel sounds are normal.      Palpations: Abdomen is soft.   Musculoskeletal:         General: Normal range of motion.      Cervical back: Normal range of motion and neck supple.   Skin:     General: Skin is warm and dry.      Capillary Refill: Capillary refill takes less than 2 seconds.   Neurological:      General: No focal deficit present.      Mental Status: He is alert and oriented to person, place, and time.   Psychiatric:         Mood and Affect: Mood normal.         Behavior: Behavior normal.         Thought Content: Thought content normal.         Judgment: Judgment normal.      Home Medications     Medication List      START taking these medications     amLODIPine 10 mg tablet; Commonly known as: Norvasc; Take 1 tablet (10   mg) by mouth once daily.   pantoprazole 40 mg EC tablet; Commonly known as: ProtoNix; Take 1 tablet   (40 mg) by mouth 2 times a day. Do not crush, chew, or split.     CONTINUE taking these medications     cholecalciferol 50,000 unit capsule; Commonly known as: Vitamin D-3;   TAKE ONE TABLET BY MOUTH ONCE WEEKLY   dorzolamide 2 % ophthalmic solution; Commonly known as: Trusopt   latanoprost 0.005 % ophthalmic solution; Commonly known as: Xalatan   QUEtiapine 25 mg tablet; Commonly known as: SEROquel     STOP taking these medications     hydroCHLOROthiazide 25 mg tablet; Commonly known as: HYDRODiuril   losartan 50 mg tablet; Commonly known as: Cozaar       Outpatient  Follow-Up  Future Appointments   Date Time Provider Department Center   11/26/2024 10:00 AM MD [facility] [place]       Luc Sommers, DO

## 2024-09-17 NOTE — NURSING NOTE
Met with patient at the bedside. Discharge Planning discussed. AVS updated with follow-ups, education, and medication information. Preparing for your Discharge paper filled out. Verified/ entered a PCP and pharmacy. New medications and side effects discussed. All questions answered.  Updated nurse on this info. AVS printed and hi-lighted. IV removed.

## 2024-09-17 NOTE — PROGRESS NOTES
"Rasheed Antonio is a 84 y.o. male on day 2 of admission presenting with Anemia.    Subjective   No events overnight. Patient seen and examined at bedside. He is alert and oriented x3. No complaints.        Objective     Physical Exam  Constitutional:       Appearance: Normal appearance.   HENT:      Head: Normocephalic and atraumatic.      Nose: Nose normal.      Mouth/Throat:      Mouth: Mucous membranes are moist.      Pharynx: Oropharynx is clear.   Eyes:      Extraocular Movements: Extraocular movements intact.      Conjunctiva/sclera: Conjunctivae normal.      Pupils: Pupils are equal, round, and reactive to light.   Cardiovascular:      Rate and Rhythm: Normal rate and regular rhythm.      Pulses: Normal pulses.      Heart sounds: Normal heart sounds.   Pulmonary:      Effort: Pulmonary effort is normal.      Breath sounds: Normal breath sounds.   Abdominal:      General: Abdomen is flat. Bowel sounds are normal.      Palpations: Abdomen is soft.   Musculoskeletal:         General: Normal range of motion.      Cervical back: Normal range of motion and neck supple.   Skin:     General: Skin is warm and dry.      Capillary Refill: Capillary refill takes less than 2 seconds.   Neurological:      General: No focal deficit present.      Mental Status: He is alert and oriented to person, place, and time.   Psychiatric:         Mood and Affect: Mood normal.         Behavior: Behavior normal.         Thought Content: Thought content normal.         Judgment: Judgment normal.      Last Recorded Vitals  Blood pressure 154/83, pulse 82, temperature 37.4 °C (99.3 °F), resp. rate 16, height 1.626 m (5' 4\"), weight 59 kg (130 lb), SpO2 96%.  Intake/Output last 3 Shifts:  I/O last 3 completed shifts:  In: 900 (15.3 mL/kg) [I.V.:900 (15.3 mL/kg)]  Out: 1325 (22.5 mL/kg) [Urine:1325 (0.6 mL/kg/hr)]  Weight: 59 kg     Relevant Results                              Assessment/Plan   Assessment & Plan  Anemia    Dark " stools    Weakness    84 year old male with a past medical history of HTN, CKD III, GERD, dementia, anxiety, who presented to [facility] ED today via EMS from [facility] with a hemoglobin of 6.4. States he had a couple episodes of diarrhea that were very dark a couple days ago but none yesterday. GI consulted. Patient to be admitted to hospital for further medical management.      #?GI bleed (patient reported dark stools)  #Acute on chronic anemia  #DILMA on CKD  -Admit to OBS/RMF to Dr. Luc Sommers   -GI consult and appreciate recs  -H&H 7.3/23.5. Transfuse pRBC if Hgb<7. Will trend H&H. -- transfused 2 units pRBCs  -Blood consent signed and in computer  -Check hemoccult   -LR@75ml/hr  -NPO. Attending to advance as tolerated.  -CXR ordered  -UA ordered and pending   -Repeat labs (type and screen ordered) in AM      Chronic conditions  #HTN, GERD, Dementia, anxiety  -Continue home meds as appropriate when nursing completes home med rec.   -PT/OT/SW  -DNAR/DNI     #DVT prophylaxis   Hold chemical prophylaxis d/t suspected GI bleed  Up in chair TID   SCD's      9/14: Hb 7.1, will transfuse an additional unit pRBCs as his anemia if contributing to DILMA. Continue IVF. Adjust BP meds to avoid nephrotoxins. Repeat labs in the morning.     9/15: Hb up to 9.2. Creatinine slowly improving from 2.1 down to 2.0. Continue IVF. Potential discharge tomorrow if Hb stable and Cr continues to improve.     9/16: Hb stable. Creatinine improving. Patient medically ready for discharge.          I spent 55 minutes in the professional and overall care of this patient.      Luc Sommers, DO

## 2024-09-17 NOTE — PROGRESS NOTES
Social work consult placed for discharge planning. SW reviewed pt's chart and communicated with TCC. No SW needs foreseen at this time. SW signing off; available upon request.

## 2024-09-17 NOTE — CARE PLAN
Mare Banuelos in the procedure area called to go over pre op for patient since she is scheduled 10/26 for Lumbar RFA, in previous notes it is stated here that no pa was required for lumbar RFA but not sure if we wanted to take that since there was issues in the past with no pa req.     Patient thought this procedure was cancelled after speaking with you but I was not aware. Patient is requesting a call back from you directly. Once you speak with her let me know and I can take her off schedule if need be.   The patient's goals for the shift include comfort    The clinical goals for the shift include pt to remain free of falls during this shift    Over the shift, the patient is progressing toward the following goals.

## 2024-09-17 NOTE — PROGRESS NOTES
09/17/24 1032   Discharge Planning   Care Facility Name Returning to aL     Spoke with bedside nurse. Plan for patient to return to AL today with HC--HC agency made aware of dc today. Nursing to have floor arrange transport back home.

## 2024-09-18 ENCOUNTER — NURSING HOME VISIT (OUTPATIENT)
Dept: POST ACUTE CARE | Facility: EXTERNAL LOCATION | Age: 84
End: 2024-09-18
Payer: MEDICARE

## 2024-09-18 DIAGNOSIS — F10.27 MILD DEMENTIA ASSOCIATED WITH ALCOHOLISM, WITHOUT BEHAVIORAL DISTURBANCE, PSYCHOTIC DISTURBANCE, MOOD DISTURBANCE, OR ANXIETY (MULTI): ICD-10-CM

## 2024-09-18 DIAGNOSIS — N18.30 STAGE 3 CHRONIC KIDNEY DISEASE, UNSPECIFIED WHETHER STAGE 3A OR 3B CKD (MULTI): ICD-10-CM

## 2024-09-18 DIAGNOSIS — D50.0 IRON DEFICIENCY ANEMIA DUE TO CHRONIC BLOOD LOSS: Primary | ICD-10-CM

## 2024-09-18 DIAGNOSIS — K21.9 GASTROESOPHAGEAL REFLUX DISEASE WITHOUT ESOPHAGITIS: ICD-10-CM

## 2024-09-18 DIAGNOSIS — I10 HYPERTENSION, ESSENTIAL: ICD-10-CM

## 2024-09-18 PROCEDURE — 99348 HOME/RES VST EST LOW MDM 30: CPT

## 2024-09-18 NOTE — LETTER
Patient: Rasheed Antonio  : 1940    Encounter Date: 2024    PROGRESS NOTE    Subjective  Chief complaint: Rasheed Antonio is a 84 y.o. male who is an assisted living facility patient being seen and evaluated for general medical care and monthly follow-up    HPI:  Patient seen and evaluated for general medical care and monthly follow-up.  Patient at baseline mentation, cooperative, pleasant.  Recent IPA for anemia, DILMA and possible GI bleed.  Transfused with 2 units P RBCs.  Hgb 9.1 on discharge.   Offers no complaints of dark tarry stools, abdominal pain, dizziness, F/C/N/V/D.  Appetite good.  Sleeping well.  EGD scheduled for 2024. No concerns per nursing      Objective  Vital signs:  114/67, 98.7, 78, 18, 96%    Physical Exam  Constitutional:       Appearance: Normal appearance.   HENT:      Head: Normocephalic.      Mouth/Throat:      Mouth: Mucous membranes are moist.   Eyes:      Extraocular Movements: Extraocular movements intact.   Cardiovascular:      Rate and Rhythm: Normal rate and regular rhythm.      Pulses: Normal pulses.      Heart sounds: Normal heart sounds.   Pulmonary:      Effort: Pulmonary effort is normal.      Breath sounds: Normal breath sounds.   Abdominal:      General: Bowel sounds are normal.      Palpations: Abdomen is soft.   Musculoskeletal:         General: Normal range of motion.      Cervical back: Normal range of motion and neck supple.   Skin:     General: Skin is warm and dry.      Capillary Refill: Capillary refill takes less than 2 seconds.   Neurological:      Mental Status: He is alert. Mental status is at baseline.   Psychiatric:         Mood and Affect: Mood normal.         Behavior: Behavior normal.         Assessment/Plan  Problem List Items Addressed This Visit       Hypertension, essential      BP at goal   Amlodipine   routine BMP   monitor         Mild dementia associated with alcoholism, without behavioral disturbance, psychotic disturbance, mood  disturbance, or anxiety (Multi)      Stable   baseline mentation   no outbursts or agitation noted per nursing   monitor         Gastroesophageal reflux disease without esophagitis      Stable   no complaints of epigastric pain or acidic taste in mouth   Encouraged to stay sitting up after eating   Pantoprazole   EGD scheduled for 9/26   monitor         CKD (chronic kidney disease) stage 3, GFR 30-59 ml/min (Multi)      BUN/CR 28/2.17 with GFR 29 (9/17/24)   avoid nephrotoxins   monitor labs         Anemia - Primary     Recent IPA with transfusion of 2 units PRBCs  Hgb 9.1 on discharge 9/17/2024  No further dark stools, dizziness, SOB  monitor CBC            Medications, treatments, and labs reviewed  Continue medications and treatments as listed in EMR    JUDE Solano      Electronically Signed By: JUDE Solano   9/22/24  1:45 PM

## 2024-09-22 NOTE — ASSESSMENT & PLAN NOTE
Stable   no complaints of epigastric pain or acidic taste in mouth   Encouraged to stay sitting up after eating   Pantoprazole   EGD scheduled for 9/26   monitor

## 2024-09-22 NOTE — ASSESSMENT & PLAN NOTE
Recent IPA with transfusion of 2 units PRBCs  Hgb 9.1 on discharge 9/17/2024  No further dark stools, dizziness, SOB  monitor CBC

## 2024-09-22 NOTE — PROGRESS NOTES
PROGRESS NOTE    Subjective   Chief complaint: Rasheed Antonio is a 84 y.o. male who is an assisted living facility patient being seen and evaluated for general medical care and monthly follow-up    HPI:  Patient seen and evaluated for general medical care and monthly follow-up.  Patient at baseline mentation, cooperative, pleasant.  Recent IPA for anemia, DILMA and possible GI bleed.  Transfused with 2 units P RBCs.  Hgb 9.1 on discharge.   Offers no complaints of dark tarry stools, abdominal pain, dizziness, F/C/N/V/D.  Appetite good.  Sleeping well.  EGD scheduled for 9/26/2024. No concerns per nursing      Objective   Vital signs:  114/67, 98.7, 78, 18, 96%    Physical Exam  Constitutional:       Appearance: Normal appearance.   HENT:      Head: Normocephalic.      Mouth/Throat:      Mouth: Mucous membranes are moist.   Eyes:      Extraocular Movements: Extraocular movements intact.   Cardiovascular:      Rate and Rhythm: Normal rate and regular rhythm.      Pulses: Normal pulses.      Heart sounds: Normal heart sounds.   Pulmonary:      Effort: Pulmonary effort is normal.      Breath sounds: Normal breath sounds.   Abdominal:      General: Bowel sounds are normal.      Palpations: Abdomen is soft.   Musculoskeletal:         General: Normal range of motion.      Cervical back: Normal range of motion and neck supple.   Skin:     General: Skin is warm and dry.      Capillary Refill: Capillary refill takes less than 2 seconds.   Neurological:      Mental Status: He is alert. Mental status is at baseline.   Psychiatric:         Mood and Affect: Mood normal.         Behavior: Behavior normal.         Assessment/Plan   Problem List Items Addressed This Visit       Hypertension, essential      BP at goal   Amlodipine   routine BMP   monitor         Mild dementia associated with alcoholism, without behavioral disturbance, psychotic disturbance, mood disturbance, or anxiety (Multi)      Stable   baseline mentation   no  outbursts or agitation noted per nursing   monitor         Gastroesophageal reflux disease without esophagitis      Stable   no complaints of epigastric pain or acidic taste in mouth   Encouraged to stay sitting up after eating   Pantoprazole   EGD scheduled for 9/26   monitor         CKD (chronic kidney disease) stage 3, GFR 30-59 ml/min (Multi)      BUN/CR 28/2.17 with GFR 29 (9/17/24)   avoid nephrotoxins   monitor labs         Anemia - Primary     Recent IPA with transfusion of 2 units PRBCs  Hgb 9.1 on discharge 9/17/2024  No further dark stools, dizziness, SOB  monitor CBC            Medications, treatments, and labs reviewed  Continue medications and treatments as listed in EMR    Lu Gayle, APRN-CNP

## 2024-09-25 NOTE — DOCUMENTATION CLARIFICATION NOTE
"    PATIENT:               ILAN BUNDY  ACCT #:                  [id number]  MRN:                       [id number]  :                       1940  ADMIT DATE:       2024 6:55 PM  DISCH DATE:        2024 2:20 PM  RESPONDING PROVIDER #:        [id number]          PROVIDER RESPONSE TEXT:    Acute blood loss anemia    CDI QUERY TEXT:    Clarification        Instruction:    Based on your assessment of the patient and the clinical information, please provide the requested documentation by clicking on the appropriate radio button and enter any additional information if prompted.    Question: Please further clarify the diagnosis of anemia as    When answering this query, please exercise your independent professional judgment. The fact that a question is being asked, does not imply that any particular answer is desired or expected.    The patient's clinical indicators include:  Clinical Information:  84 year old male with PMHx of HTN, CKD III, GERD, dementia, anxiety,  presented with a hemoglobin of 6.4.    Clinical Indicators:    24 GI Consult:  \"Anemia  Suspected upper GI bleed\"    24 EGD: \"Grade D esophagitis in the middle third of the esophagus, lower third of the esophagus and GE junction\"    24 D/C Summary, Dr. Sommers:  \" GI bleed -patient reported dark stools-  Acute on chronic anemia\"    H/H Trend,  - 9/15:  7.3/23.5, 6.1/20.3, 8.1/25.4, 8.0/25.7, 7.1/22.5, 9.2/30.0    Treatment:    - transfused 2 units pRBCs,  -   - chemical prophylaxis held due to suspected GI bleed  - trended H/H    Risk Factors:  esophagitis, CKD  Options provided:  -- Acute blood loss anemia  -- Macrocytic anemia  -- Chronic blood loss anemia  -- Anemia due to chronic disease, Please specify chronic disease below  -- Other - I will add my own diagnosis  -- Refer to Clinical Documentation Reviewer    Query created by: Melony Winter on 2024 7:02 AM      Electronically signed by:  ARTHUR SOMMERS " DO 9/24/2024 9:14 PM

## 2024-10-15 ENCOUNTER — APPOINTMENT (OUTPATIENT)
Dept: RADIOLOGY | Facility: HOSPITAL | Age: 84
DRG: 871 | End: 2024-10-15
Payer: MEDICARE

## 2024-10-15 ENCOUNTER — APPOINTMENT (OUTPATIENT)
Dept: CARDIOLOGY | Facility: HOSPITAL | Age: 84
DRG: 871 | End: 2024-10-15
Payer: MEDICARE

## 2024-10-15 ENCOUNTER — HOSPITAL ENCOUNTER (INPATIENT)
Facility: HOSPITAL | Age: 84
LOS: 4 days | Discharge: NURSING, REHAB OR LONG-TERM CARE | DRG: 871 | End: 2024-10-19
Attending: EMERGENCY MEDICINE | Admitting: INTERNAL MEDICINE
Payer: MEDICARE

## 2024-10-15 DIAGNOSIS — A41.9 SEPTIC SHOCK (MULTI): ICD-10-CM

## 2024-10-15 DIAGNOSIS — R79.89 OTHER SPECIFIED ABNORMAL FINDINGS OF BLOOD CHEMISTRY: ICD-10-CM

## 2024-10-15 DIAGNOSIS — J18.9 PNEUMONIA OF BOTH LOWER LOBES DUE TO INFECTIOUS ORGANISM: Primary | ICD-10-CM

## 2024-10-15 DIAGNOSIS — R06.02 SHORTNESS OF BREATH: ICD-10-CM

## 2024-10-15 DIAGNOSIS — R65.21 SEPTIC SHOCK (MULTI): ICD-10-CM

## 2024-10-15 LAB
ALBUMIN SERPL BCP-MCNC: 4 G/DL (ref 3.4–5)
ALP SERPL-CCNC: 45 U/L (ref 33–136)
ALT SERPL W P-5'-P-CCNC: 19 U/L (ref 10–52)
ANION GAP BLDV CALCULATED.4IONS-SCNC: 14 MMOL/L (ref 10–25)
ANION GAP SERPL CALC-SCNC: 14 MMOL/L (ref 10–20)
AST SERPL W P-5'-P-CCNC: 71 U/L (ref 9–39)
BASE EXCESS BLDV CALC-SCNC: -6.4 MMOL/L (ref -2–3)
BASOPHILS # BLD AUTO: 0.03 X10*3/UL (ref 0–0.1)
BASOPHILS NFR BLD AUTO: 0.3 %
BILIRUB SERPL-MCNC: 0.4 MG/DL (ref 0–1.2)
BNP SERPL-MCNC: 1839 PG/ML (ref 0–99)
BODY TEMPERATURE: 37 DEGREES CELSIUS
BUN SERPL-MCNC: 37 MG/DL (ref 6–23)
CA-I BLDV-SCNC: 1.23 MMOL/L (ref 1.1–1.33)
CALCIUM SERPL-MCNC: 9.3 MG/DL (ref 8.6–10.3)
CARDIAC TROPONIN I PNL SERPL HS: ABNORMAL NG/L (ref 0–20)
CHLORIDE BLDV-SCNC: 112 MMOL/L (ref 98–107)
CHLORIDE SERPL-SCNC: 109 MMOL/L (ref 98–107)
CO2 SERPL-SCNC: 22 MMOL/L (ref 21–32)
CREAT SERPL-MCNC: 2.46 MG/DL (ref 0.5–1.3)
D DIMER PPP FEU-MCNC: 1157 NG/ML FEU
EGFRCR SERPLBLD CKD-EPI 2021: 25 ML/MIN/1.73M*2
EOSINOPHIL # BLD AUTO: 0 X10*3/UL (ref 0–0.4)
EOSINOPHIL NFR BLD AUTO: 0 %
ERYTHROCYTE [DISTWIDTH] IN BLOOD BY AUTOMATED COUNT: 13.8 % (ref 11.5–14.5)
GLUCOSE BLDV-MCNC: 120 MG/DL (ref 74–99)
GLUCOSE SERPL-MCNC: 118 MG/DL (ref 74–99)
HCO3 BLDV-SCNC: 19.7 MMOL/L (ref 22–26)
HCT VFR BLD AUTO: 29.1 % (ref 41–52)
HCT VFR BLD EST: 27 % (ref 41–52)
HGB BLD-MCNC: 8.8 G/DL (ref 13.5–17.5)
HGB BLDV-MCNC: 8.9 G/DL (ref 13.5–17.5)
HOLD SPECIMEN: NORMAL
IMM GRANULOCYTES # BLD AUTO: 0.05 X10*3/UL (ref 0–0.5)
IMM GRANULOCYTES NFR BLD AUTO: 0.5 % (ref 0–0.9)
INHALED O2 CONCENTRATION: 40 %
INR PPP: 1.1 (ref 0.9–1.1)
LACTATE BLDV-SCNC: 2.1 MMOL/L (ref 0.4–2)
LACTATE SERPL-SCNC: 1.9 MMOL/L (ref 0.4–2)
LACTATE SERPL-SCNC: 2.5 MMOL/L (ref 0.4–2)
LYMPHOCYTES # BLD AUTO: 1.2 X10*3/UL (ref 0.8–3)
LYMPHOCYTES NFR BLD AUTO: 11.8 %
MAGNESIUM SERPL-MCNC: 1.73 MG/DL (ref 1.6–2.4)
MCH RBC QN AUTO: 26.6 PG (ref 26–34)
MCHC RBC AUTO-ENTMCNC: 30.2 G/DL (ref 32–36)
MCV RBC AUTO: 88 FL (ref 80–100)
MONOCYTES # BLD AUTO: 0.89 X10*3/UL (ref 0.05–0.8)
MONOCYTES NFR BLD AUTO: 8.7 %
MRSA DNA SPEC QL NAA+PROBE: NOT DETECTED
NEUTROPHILS # BLD AUTO: 8.01 X10*3/UL (ref 1.6–5.5)
NEUTROPHILS NFR BLD AUTO: 78.7 %
NRBC BLD-RTO: 0 /100 WBCS (ref 0–0)
OXYHGB MFR BLDV: 33.3 % (ref 45–75)
PCO2 BLDV: 41 MM HG (ref 41–51)
PH BLDV: 7.29 PH (ref 7.33–7.43)
PLATELET # BLD AUTO: 186 X10*3/UL (ref 150–450)
PO2 BLDV: 30 MM HG (ref 35–45)
POTASSIUM BLDV-SCNC: 3.8 MMOL/L (ref 3.5–5.3)
POTASSIUM SERPL-SCNC: 3.6 MMOL/L (ref 3.5–5.3)
PROT SERPL-MCNC: 7.4 G/DL (ref 6.4–8.2)
PROTHROMBIN TIME: 12.8 SECONDS (ref 9.8–12.8)
RBC # BLD AUTO: 3.31 X10*6/UL (ref 4.5–5.9)
SAO2 % BLDV: 34 % (ref 45–75)
SARS-COV-2 RNA RESP QL NAA+PROBE: DETECTED
SODIUM BLDV-SCNC: 142 MMOL/L (ref 136–145)
SODIUM SERPL-SCNC: 141 MMOL/L (ref 136–145)
WBC # BLD AUTO: 10.2 X10*3/UL (ref 4.4–11.3)

## 2024-10-15 PROCEDURE — 2060000001 HC INTERMEDIATE ICU ROOM DAILY

## 2024-10-15 PROCEDURE — 99285 EMERGENCY DEPT VISIT HI MDM: CPT | Mod: 25

## 2024-10-15 PROCEDURE — 93970 EXTREMITY STUDY: CPT

## 2024-10-15 PROCEDURE — 36415 COLL VENOUS BLD VENIPUNCTURE: CPT

## 2024-10-15 PROCEDURE — 96367 TX/PROPH/DG ADDL SEQ IV INF: CPT

## 2024-10-15 PROCEDURE — 96365 THER/PROPH/DIAG IV INF INIT: CPT

## 2024-10-15 PROCEDURE — 94799 UNLISTED PULMONARY SVC/PX: CPT

## 2024-10-15 PROCEDURE — 87641 MR-STAPH DNA AMP PROBE: CPT

## 2024-10-15 PROCEDURE — 94660 CPAP INITIATION&MGMT: CPT

## 2024-10-15 PROCEDURE — 85025 COMPLETE CBC W/AUTO DIFF WBC: CPT

## 2024-10-15 PROCEDURE — 71250 CT THORAX DX C-: CPT | Performed by: RADIOLOGY

## 2024-10-15 PROCEDURE — 84484 ASSAY OF TROPONIN QUANT: CPT

## 2024-10-15 PROCEDURE — 2500000004 HC RX 250 GENERAL PHARMACY W/ HCPCS (ALT 636 FOR OP/ED)

## 2024-10-15 PROCEDURE — 2500000004 HC RX 250 GENERAL PHARMACY W/ HCPCS (ALT 636 FOR OP/ED): Mod: JZ

## 2024-10-15 PROCEDURE — 2500000002 HC RX 250 W HCPCS SELF ADMINISTERED DRUGS (ALT 637 FOR MEDICARE OP, ALT 636 FOR OP/ED)

## 2024-10-15 PROCEDURE — 85610 PROTHROMBIN TIME: CPT

## 2024-10-15 PROCEDURE — 99291 CRITICAL CARE FIRST HOUR: CPT | Performed by: EMERGENCY MEDICINE

## 2024-10-15 PROCEDURE — 94760 N-INVAS EAR/PLS OXIMETRY 1: CPT

## 2024-10-15 PROCEDURE — 87449 NOS EACH ORGANISM AG IA: CPT | Mod: PARLAB

## 2024-10-15 PROCEDURE — 87899 AGENT NOS ASSAY W/OPTIC: CPT | Mod: PARLAB

## 2024-10-15 PROCEDURE — 71250 CT THORAX DX C-: CPT

## 2024-10-15 PROCEDURE — 71045 X-RAY EXAM CHEST 1 VIEW: CPT

## 2024-10-15 PROCEDURE — 94640 AIRWAY INHALATION TREATMENT: CPT

## 2024-10-15 PROCEDURE — 83605 ASSAY OF LACTIC ACID: CPT

## 2024-10-15 PROCEDURE — 5A09357 ASSISTANCE WITH RESPIRATORY VENTILATION, LESS THAN 24 CONSECUTIVE HOURS, CONTINUOUS POSITIVE AIRWAY PRESSURE: ICD-10-PCS | Performed by: INTERNAL MEDICINE

## 2024-10-15 PROCEDURE — 83735 ASSAY OF MAGNESIUM: CPT

## 2024-10-15 PROCEDURE — 83880 ASSAY OF NATRIURETIC PEPTIDE: CPT

## 2024-10-15 PROCEDURE — 93005 ELECTROCARDIOGRAM TRACING: CPT

## 2024-10-15 PROCEDURE — 2500000004 HC RX 250 GENERAL PHARMACY W/ HCPCS (ALT 636 FOR OP/ED): Performed by: INTERNAL MEDICINE

## 2024-10-15 PROCEDURE — 87635 SARS-COV-2 COVID-19 AMP PRB: CPT

## 2024-10-15 PROCEDURE — 76770 US EXAM ABDO BACK WALL COMP: CPT

## 2024-10-15 PROCEDURE — 76770 US EXAM ABDO BACK WALL COMP: CPT | Performed by: RADIOLOGY

## 2024-10-15 PROCEDURE — 2500000001 HC RX 250 WO HCPCS SELF ADMINISTERED DRUGS (ALT 637 FOR MEDICARE OP)

## 2024-10-15 PROCEDURE — 71045 X-RAY EXAM CHEST 1 VIEW: CPT | Performed by: RADIOLOGY

## 2024-10-15 PROCEDURE — 2500000005 HC RX 250 GENERAL PHARMACY W/O HCPCS: Performed by: EMERGENCY MEDICINE

## 2024-10-15 PROCEDURE — 82435 ASSAY OF BLOOD CHLORIDE: CPT

## 2024-10-15 PROCEDURE — 87040 BLOOD CULTURE FOR BACTERIA: CPT | Mod: PARLAB

## 2024-10-15 PROCEDURE — 85379 FIBRIN DEGRADATION QUANT: CPT

## 2024-10-15 PROCEDURE — 93971 EXTREMITY STUDY: CPT | Performed by: RADIOLOGY

## 2024-10-15 RX ORDER — GLYCOPYRROLATE 0.2 MG/ML
0.2 INJECTION INTRAMUSCULAR; INTRAVENOUS EVERY 4 HOURS PRN
Status: DISCONTINUED | OUTPATIENT
Start: 2024-10-15 | End: 2024-10-19 | Stop reason: HOSPADM

## 2024-10-15 RX ORDER — POLYETHYLENE GLYCOL 3350 17 G/17G
17 POWDER, FOR SOLUTION ORAL DAILY PRN
Status: DISCONTINUED | OUTPATIENT
Start: 2024-10-15 | End: 2024-10-19 | Stop reason: HOSPADM

## 2024-10-15 RX ORDER — GUAIFENESIN 600 MG/1
1200 TABLET, EXTENDED RELEASE ORAL 2 TIMES DAILY
COMMUNITY
Start: 2024-10-15 | End: 2024-10-19 | Stop reason: HOSPADM

## 2024-10-15 RX ORDER — NAPROXEN SODIUM 220 MG/1
324 TABLET, FILM COATED ORAL ONCE
Status: DISCONTINUED | OUTPATIENT
Start: 2024-10-15 | End: 2024-10-19 | Stop reason: HOSPADM

## 2024-10-15 RX ORDER — FUROSEMIDE 10 MG/ML
20 INJECTION INTRAMUSCULAR; INTRAVENOUS ONCE
Status: COMPLETED | OUTPATIENT
Start: 2024-10-15 | End: 2024-10-15

## 2024-10-15 RX ORDER — HEPARIN SODIUM 5000 [USP'U]/ML
5000 INJECTION, SOLUTION INTRAVENOUS; SUBCUTANEOUS EVERY 8 HOURS
Status: DISCONTINUED | OUTPATIENT
Start: 2024-10-15 | End: 2024-10-19 | Stop reason: HOSPADM

## 2024-10-15 RX ORDER — GUAIFENESIN 600 MG/1
1200 TABLET, EXTENDED RELEASE ORAL 2 TIMES DAILY
Status: DISCONTINUED | OUTPATIENT
Start: 2024-10-15 | End: 2024-10-19 | Stop reason: HOSPADM

## 2024-10-15 RX ORDER — AMLODIPINE BESYLATE 10 MG/1
10 TABLET ORAL DAILY
Status: DISCONTINUED | OUTPATIENT
Start: 2024-10-15 | End: 2024-10-19 | Stop reason: HOSPADM

## 2024-10-15 RX ORDER — ACETAMINOPHEN 160 MG/5ML
650 SOLUTION ORAL EVERY 4 HOURS PRN
Status: DISCONTINUED | OUTPATIENT
Start: 2024-10-15 | End: 2024-10-19 | Stop reason: HOSPADM

## 2024-10-15 RX ORDER — CEFEPIME 1 G/50ML
2 INJECTION, SOLUTION INTRAVENOUS EVERY 24 HOURS
Status: DISCONTINUED | OUTPATIENT
Start: 2024-10-15 | End: 2024-10-19 | Stop reason: HOSPADM

## 2024-10-15 RX ORDER — ACETAMINOPHEN 650 MG/1
650 SUPPOSITORY RECTAL EVERY 4 HOURS PRN
Status: DISCONTINUED | OUTPATIENT
Start: 2024-10-15 | End: 2024-10-19 | Stop reason: HOSPADM

## 2024-10-15 RX ORDER — VANCOMYCIN HYDROCHLORIDE 1 G/200ML
1000 INJECTION, SOLUTION INTRAVENOUS ONCE
Status: COMPLETED | OUTPATIENT
Start: 2024-10-15 | End: 2024-10-15

## 2024-10-15 RX ORDER — METRONIDAZOLE 500 MG/100ML
500 INJECTION, SOLUTION INTRAVENOUS ONCE
Status: COMPLETED | OUTPATIENT
Start: 2024-10-15 | End: 2024-10-15

## 2024-10-15 RX ORDER — IPRATROPIUM BROMIDE AND ALBUTEROL SULFATE 2.5; .5 MG/3ML; MG/3ML
3 SOLUTION RESPIRATORY (INHALATION) ONCE
Status: COMPLETED | OUTPATIENT
Start: 2024-10-15 | End: 2024-10-15

## 2024-10-15 RX ORDER — DORZOLAMIDE HCL 20 MG/ML
1 SOLUTION/ DROPS OPHTHALMIC 3 TIMES DAILY
Status: DISCONTINUED | OUTPATIENT
Start: 2024-10-15 | End: 2024-10-19 | Stop reason: HOSPADM

## 2024-10-15 RX ORDER — ACETAMINOPHEN 325 MG/1
650 TABLET ORAL EVERY 4 HOURS PRN
Status: DISCONTINUED | OUTPATIENT
Start: 2024-10-15 | End: 2024-10-19 | Stop reason: HOSPADM

## 2024-10-15 RX ORDER — DOXYCYCLINE HYCLATE 100 MG
100 TABLET ORAL 2 TIMES DAILY
COMMUNITY
Start: 2024-10-15 | End: 2024-10-19 | Stop reason: HOSPADM

## 2024-10-15 RX ORDER — ACETAMINOPHEN 325 MG/1
650 TABLET ORAL EVERY 8 HOURS PRN
COMMUNITY

## 2024-10-15 RX ORDER — PANTOPRAZOLE SODIUM 40 MG/1
40 TABLET, DELAYED RELEASE ORAL 2 TIMES DAILY
Status: DISCONTINUED | OUTPATIENT
Start: 2024-10-15 | End: 2024-10-19 | Stop reason: HOSPADM

## 2024-10-15 RX ORDER — ALBUTEROL SULFATE 0.83 MG/ML
3 SOLUTION RESPIRATORY (INHALATION) EVERY 4 HOURS PRN
Status: DISCONTINUED | OUTPATIENT
Start: 2024-10-15 | End: 2024-10-19 | Stop reason: HOSPADM

## 2024-10-15 RX ORDER — VANCOMYCIN HYDROCHLORIDE 1 G/20ML
INJECTION, POWDER, LYOPHILIZED, FOR SOLUTION INTRAVENOUS DAILY PRN
Status: DISCONTINUED | OUTPATIENT
Start: 2024-10-15 | End: 2024-10-16 | Stop reason: ALTCHOICE

## 2024-10-15 RX ORDER — VANCOMYCIN HYDROCHLORIDE 1 G/20ML
INJECTION, POWDER, LYOPHILIZED, FOR SOLUTION INTRAVENOUS DAILY PRN
Status: DISCONTINUED | OUTPATIENT
Start: 2024-10-15 | End: 2024-10-15

## 2024-10-15 RX ORDER — QUETIAPINE FUMARATE 25 MG/1
25 TABLET, FILM COATED ORAL 2 TIMES DAILY
Status: DISCONTINUED | OUTPATIENT
Start: 2024-10-15 | End: 2024-10-19 | Stop reason: HOSPADM

## 2024-10-15 RX ORDER — NAPROXEN SODIUM 220 MG/1
81 TABLET, FILM COATED ORAL DAILY
Status: DISCONTINUED | OUTPATIENT
Start: 2024-10-16 | End: 2024-10-19 | Stop reason: HOSPADM

## 2024-10-15 RX ORDER — LATANOPROST 50 UG/ML
1 SOLUTION/ DROPS OPHTHALMIC NIGHTLY
Status: DISCONTINUED | OUTPATIENT
Start: 2024-10-15 | End: 2024-10-19 | Stop reason: HOSPADM

## 2024-10-15 RX ORDER — IPRATROPIUM BROMIDE AND ALBUTEROL SULFATE 2.5; .5 MG/3ML; MG/3ML
3 SOLUTION RESPIRATORY (INHALATION)
Status: DISCONTINUED | OUTPATIENT
Start: 2024-10-15 | End: 2024-10-15

## 2024-10-15 RX ORDER — IPRATROPIUM BROMIDE AND ALBUTEROL SULFATE 2.5; .5 MG/3ML; MG/3ML
3 SOLUTION RESPIRATORY (INHALATION) EVERY 2 HOUR PRN
Status: DISCONTINUED | OUTPATIENT
Start: 2024-10-15 | End: 2024-10-19 | Stop reason: HOSPADM

## 2024-10-15 SDOH — SOCIAL STABILITY: SOCIAL INSECURITY: HAS ANYONE EVER THREATENED TO HURT YOUR FAMILY OR YOUR PETS?: NO

## 2024-10-15 SDOH — SOCIAL STABILITY: SOCIAL INSECURITY: WERE YOU ABLE TO COMPLETE ALL THE BEHAVIORAL HEALTH SCREENINGS?: YES

## 2024-10-15 SDOH — SOCIAL STABILITY: SOCIAL INSECURITY: DO YOU FEEL UNSAFE GOING BACK TO THE PLACE WHERE YOU ARE LIVING?: NO

## 2024-10-15 SDOH — SOCIAL STABILITY: SOCIAL INSECURITY: ABUSE: ADULT

## 2024-10-15 SDOH — SOCIAL STABILITY: SOCIAL INSECURITY: HAVE YOU HAD ANY THOUGHTS OF HARMING ANYONE ELSE?: NO

## 2024-10-15 SDOH — SOCIAL STABILITY: SOCIAL INSECURITY: HAVE YOU HAD THOUGHTS OF HARMING ANYONE ELSE?: NO

## 2024-10-15 SDOH — SOCIAL STABILITY: SOCIAL INSECURITY: ARE THERE ANY APPARENT SIGNS OF INJURIES/BEHAVIORS THAT COULD BE RELATED TO ABUSE/NEGLECT?: NO

## 2024-10-15 SDOH — SOCIAL STABILITY: SOCIAL INSECURITY: DOES ANYONE TRY TO KEEP YOU FROM HAVING/CONTACTING OTHER FRIENDS OR DOING THINGS OUTSIDE YOUR HOME?: NO

## 2024-10-15 SDOH — SOCIAL STABILITY: SOCIAL INSECURITY: DO YOU FEEL ANYONE HAS EXPLOITED OR TAKEN ADVANTAGE OF YOU FINANCIALLY OR OF YOUR PERSONAL PROPERTY?: NO

## 2024-10-15 SDOH — SOCIAL STABILITY: SOCIAL INSECURITY: ARE YOU OR HAVE YOU BEEN THREATENED OR ABUSED PHYSICALLY, EMOTIONALLY, OR SEXUALLY BY ANYONE?: NO

## 2024-10-15 ASSESSMENT — ACTIVITIES OF DAILY LIVING (ADL)
BATHING: NEEDS ASSISTANCE
ASSISTIVE_DEVICE: WALKER
TOILETING: NEEDS ASSISTANCE
JUDGMENT_ADEQUATE_SAFELY_COMPLETE_DAILY_ACTIVITIES: YES
FEEDING YOURSELF: INDEPENDENT
PATIENT'S MEMORY ADEQUATE TO SAFELY COMPLETE DAILY ACTIVITIES?: NO
ADEQUATE_TO_COMPLETE_ADL: YES
GROOMING: INDEPENDENT
HEARING - RIGHT EAR: FUNCTIONAL
WALKS IN HOME: NEEDS ASSISTANCE
HEARING - LEFT EAR: FUNCTIONAL
DRESSING YOURSELF: NEEDS ASSISTANCE
LACK_OF_TRANSPORTATION: NO

## 2024-10-15 ASSESSMENT — COGNITIVE AND FUNCTIONAL STATUS - GENERAL
STANDING UP FROM CHAIR USING ARMS: A LITTLE
TOILETING: A LITTLE
DRESSING REGULAR LOWER BODY CLOTHING: A LITTLE
DAILY ACTIVITIY SCORE: 19
TURNING FROM BACK TO SIDE WHILE IN FLAT BAD: A LITTLE
DRESSING REGULAR UPPER BODY CLOTHING: A LITTLE
DRESSING REGULAR UPPER BODY CLOTHING: A LITTLE
MOBILITY SCORE: 16
HELP NEEDED FOR BATHING: A LITTLE
CLIMB 3 TO 5 STEPS WITH RAILING: A LOT
PERSONAL GROOMING: A LITTLE
PATIENT BASELINE BEDBOUND: NO
HELP NEEDED FOR BATHING: A LITTLE
CLIMB 3 TO 5 STEPS WITH RAILING: A LOT
WALKING IN HOSPITAL ROOM: A LOT
MOBILITY SCORE: 16
TURNING FROM BACK TO SIDE WHILE IN FLAT BAD: A LITTLE
MOVING TO AND FROM BED TO CHAIR: A LITTLE
EATING MEALS: A LITTLE
DAILY ACTIVITIY SCORE: 18
MOVING TO AND FROM BED TO CHAIR: A LITTLE
MOVING FROM LYING ON BACK TO SITTING ON SIDE OF FLAT BED WITH BEDRAILS: A LITTLE
WALKING IN HOSPITAL ROOM: A LOT
DRESSING REGULAR LOWER BODY CLOTHING: A LITTLE
MOVING FROM LYING ON BACK TO SITTING ON SIDE OF FLAT BED WITH BEDRAILS: A LITTLE
TOILETING: A LITTLE
PERSONAL GROOMING: A LITTLE
STANDING UP FROM CHAIR USING ARMS: A LITTLE

## 2024-10-15 ASSESSMENT — ENCOUNTER SYMPTOMS
ACTIVITY CHANGE: 0
COUGH: 1
DIARRHEA: 0
CONSTIPATION: 0
VOMITING: 0
CHILLS: 1
NAUSEA: 0
RHINORRHEA: 1
FEVER: 1
SORE THROAT: 0
PALPITATIONS: 0
SHORTNESS OF BREATH: 1
WHEEZING: 1
ABDOMINAL DISTENTION: 0
COLOR CHANGE: 0
DYSURIA: 0
ABDOMINAL PAIN: 0
DIFFICULTY URINATING: 0

## 2024-10-15 ASSESSMENT — LIFESTYLE VARIABLES
HOW MANY STANDARD DRINKS CONTAINING ALCOHOL DO YOU HAVE ON A TYPICAL DAY: PATIENT DOES NOT DRINK
AUDIT-C TOTAL SCORE: 0
AUDIT-C TOTAL SCORE: 0
TOTAL SCORE: 0
EVER HAD A DRINK FIRST THING IN THE MORNING TO STEADY YOUR NERVES TO GET RID OF A HANGOVER: NO
EVER FELT BAD OR GUILTY ABOUT YOUR DRINKING: NO
HAVE YOU EVER FELT YOU SHOULD CUT DOWN ON YOUR DRINKING: NO
HOW OFTEN DO YOU HAVE 6 OR MORE DRINKS ON ONE OCCASION: NEVER
HAVE PEOPLE ANNOYED YOU BY CRITICIZING YOUR DRINKING: NO
HOW OFTEN DO YOU HAVE A DRINK CONTAINING ALCOHOL: NEVER
SUBSTANCE_ABUSE_PAST_12_MONTHS: NO
SKIP TO QUESTIONS 9-10: 1
PRESCIPTION_ABUSE_PAST_12_MONTHS: NO

## 2024-10-15 ASSESSMENT — PAIN - FUNCTIONAL ASSESSMENT: PAIN_FUNCTIONAL_ASSESSMENT: 0-10

## 2024-10-15 ASSESSMENT — COLUMBIA-SUICIDE SEVERITY RATING SCALE - C-SSRS
1. IN THE PAST MONTH, HAVE YOU WISHED YOU WERE DEAD OR WISHED YOU COULD GO TO SLEEP AND NOT WAKE UP?: NO
6. HAVE YOU EVER DONE ANYTHING, STARTED TO DO ANYTHING, OR PREPARED TO DO ANYTHING TO END YOUR LIFE?: NO
2. HAVE YOU ACTUALLY HAD ANY THOUGHTS OF KILLING YOURSELF?: NO

## 2024-10-15 ASSESSMENT — PAIN SCALES - GENERAL
PAINLEVEL_OUTOF10: 0 - NO PAIN
PAINLEVEL_OUTOF10: 0 - NO PAIN

## 2024-10-15 ASSESSMENT — PATIENT HEALTH QUESTIONNAIRE - PHQ9
SUM OF ALL RESPONSES TO PHQ9 QUESTIONS 1 & 2: 0
1. LITTLE INTEREST OR PLEASURE IN DOING THINGS: NOT AT ALL
2. FEELING DOWN, DEPRESSED OR HOPELESS: NOT AT ALL

## 2024-10-15 NOTE — ED PROVIDER NOTES
HPI   No chief complaint on file.      Rasheed is a 85 y/o male with a past medical history of hyperlipidemia, dementia, CKD stage III presenting the emergency department by ambulance from [facility] for evaluation of shortness of breath.  According EMS, patient was recently diagnosed with COVID and has developed worsening shortness of breath over the past couple days.  Apparently, he was started on doxycycline today.  EMS reports expiratory wheezing, crackles, and administration of DuoNeb en route.  Patient does not wear oxygen at baseline.  He is endorsing shortness of breath, but no cough or pleuritic pain.  Patient denies chest pain, lightheadedness, dizziness, nausea, vomiting, abdominal pain.  No fever or chills.              Patient History   Past Medical History:   Diagnosis Date    Personal history of other diseases of the respiratory system 09/28/2016    History of acute bronchitis    Personal history of other endocrine, nutritional and metabolic disease 01/13/2017    History of hyperlipidemia    Unspecified asthma with (acute) exacerbation ([facility]) 08/15/2018    Asthma exacerbation    Vitamin D deficiency, unspecified 02/11/2022    Vitamin D deficiency     No past surgical history on file.  No family history on file.  Social History     Tobacco Use    Smoking status: Never    Smokeless tobacco: Never   Vaping Use    Vaping status: Never Used   Substance Use Topics    Alcohol use: Not Currently    Drug use: Never       Physical Exam   ED Triage Vitals   Temp Pulse Resp BP   -- -- -- --      SpO2 Temp src Heart Rate Source Patient Position   -- -- -- --      BP Location FiO2 (%)     -- --       Physical Exam  Constitutional:       Comments: Patient is pleasantly demented elderly male that is alert and oriented to his baseline.   HENT:      Mouth/Throat:      Mouth: Mucous membranes are dry.      Pharynx: Oropharynx is clear.   Eyes:      Extraocular Movements: Extraocular movements intact.    Cardiovascular:      Rate and Rhythm: Regular rhythm. Tachycardia present.      Pulses: Normal pulses.      Heart sounds: Normal heart sounds.   Pulmonary:      Breath sounds: Rales present.      Comments: Patient with conversational dyspnea.  He has audible upper airway crackles when speaking.  Crackles are diffuse throughout upper and lower lungs on auscultation.  Abdominal:      General: Abdomen is flat.      Palpations: Abdomen is soft.      Tenderness: There is no abdominal tenderness. There is no guarding or rebound.   Musculoskeletal:      Cervical back: Normal range of motion. No rigidity.   Neurological:      Mental Status: He is alert. Mental status is at baseline.           ED Course & MDM   ED Course as of 10/15/24 1318   Tue Oct 15, 2024   1146 POCT pH, Venous(!): 7.29 [AH]   1146 POCT pCO2, Venous: 41 [AH]   1205 HEMOGLOBIN(!): 8.8 [AH]   1205 HEMATOCRIT(!): 29.1 [AH]   1205 Blood Urea Nitrogen(!): 37 [AH]   1205 Creatinine(!): 2.46 [AH]   1205 EGFR(!): 25 [AH]   1206 Troponin I, High Sensitivity(!!): 15,703 [AH]   1206 BNP(!): 1,839 [AH]   1215 EKG interpreted by Dr. Ceballos.  Sinus tachycardia 112 bpm.  No STEMI.  QTc 549. [AH]   1218 XR chest 1 view  Multifocal airspace disease predominantly on the right but also in  the left lung base concerning for multifocal pneumonia. A component  of edema may be present as well. Radiographs follow-up to resolution  in 2-3 weeks is advised       [AH]   1315 Troponin I, High Sensitivity(!!): 17,429 []      ED Course User Index  [AH] Lavern Murguia PA-C         Diagnoses as of 10/15/24 1318   Pneumonia of both lower lobes due to infectious organism   Septic shock (Multi)                 No data recorded                                 Medical Decision Making  Rasheed is a 85 y/o male with a past medical history of hyperlipidemia, dementia, CKD stage III presenting the emergency department by ambulance from [facility] for evaluation of shortness of breath.       Medical Decision Making: Patient has conversational dyspnea.  He is ill-appearing.  Vital signs reviewed and remarkable for tachycardia.  Diffuse rales on auscultation.  Workup included CBC, CMP, magnesium, troponin, BNP, VBG full panel, PT/INR, blood culture x 2, EKG, chest x-ray, CTA PE. There is no leukocytosis on CBC.  H&H are decreased at 8.8 and 29.1.  Patient has a history of anemia and this is around his baseline.  CMP demonstrates worsening kidney function with BUN elevated at 37, creatinine elevated at 2.46, GFR decreased at 25.  CTA PE was changed to CT chest without IV contrast. CT pending BNP is elevated at 1839.  Initial troponin is elevated at 15,703.  Delta troponin is elevated at 17,429.  Patient continues to deny any chest pain.  I suspect elevation of troponin is all demand related ischemia at this time.  VBG demonstrates respiratory acidosis with pH of 7.29.  pCO2 is normal.  CXR shows multifocal pneumonia. Patient is critically ill.  Blood cultures were collected and he was already on empiric antibiotics including IV vancomycin and IV cefepime, and IV Flagyl.  He was also started on BiPAP considering current respiratory status.  Patient has been tolerating BiPAP without difficulty.  He will require admission the hospital.  After some time, I was finally able to get in touch with patient's POA, Sahniqua Antonio.  She does not wish for any escalation to ICU care.  I did speak with ICU attending, Dr. Guzmán, about this patient and code status. She agrees with plan for admission to step down and no ICU escalation. Patient will be admitted to the stepdown unit.  I discussed case with patient's primary care provider at the [facility], Dr. Sommers, who accepted admission. Dr. Ceballos spoke with cardiology, Dr. Valles, who reportedly is not recommending any further intervention from a cardiology stance at this time. Patient and family were informed of admission and agreeable to plan.  All question concerns  were addressed.  I did emphasize repeatedly to family the critical nature of patient today and that he is very ill.      Differential diagnoses considered: Pneumonia, pleural effusion, pulmonary embolism, pulmonary edema, CHF, COPD, asthma, electrolyte disturbance, DILMA, sepsis, septic shock, arrhythmia, NSTEMI, STEMI, etc.     Medications given: DuoNeb, oxygen     Diagnosis: Pneumonia both lower lobes due to infectious organism, septic shock    Plan: Admission          Procedure  Procedures     Lavern Murguia PA-C  10/15/24 1320       Lavern Murguia PA-C  10/15/24 1411       Lavern Murguia PA-C  10/15/24 1412

## 2024-10-15 NOTE — ASSESSMENT & PLAN NOTE
Sepsis (, RR 22)  Multifocal pneumonia, right greater than left  Small bilateral pleural effusions on CT imaging  Pulmonary edema  Shortness of breath  Lactic acidosis  -Pulmonology consult and recommendations appreciated  -Patient was recently diagnosed with COVID 2 days ago, has been experiencing shortness of breath since then  -Patient on continuous BiPAP in the ED, continue use of BiPAP  -Broad-spectrum antibiotics initiated in the ED, continue  -DuoNebs scheduled, as needed albuterol  -Following blood cultures and respiratory cultures.  Strep pneumo and Legionella antigen ordered and pending  -Defer fluids for now due to rales in bilateral lung fields, pitting edema in BLE, and pulm edema/pleural effusions  -D-dimer added onto labwork due to persisting tachycardia, SOB, tachypnea, and hx of COVID    Elevated high-sensitivity troponins  Elevated BNP, 1839  History of hypertension  Prolonged QTc  -Cardiology was consulted in the ED, recommendations appreciated  -Aspirin 324 mg given one-time dose, 81mg daily starting tomorrow  -EKG, per ED physician, sinus tachycardia at 112 bpm.  No STEMI.  QTc 549  -Initial troponin 15,703, delta troponin 17,429. Third ordered  -Echo ordered  -Will hold Seroquel in the setting of prolonged QTc    DILMA on CKD III  Abnormal CT imaging of kidneys  -Cystic areas of the kidneys limited by the field of view.  Hydronephrosis can not be excluded. Ultrasound correlation recommended.  -Renal US and CT A/P added on  -Cr 2.46 and BUN 37 today ( 2.17 and 28 on 9/17)  -Avoid nephrotoxic medications  -Monitor with BMP in the AM    Lesion of liver on CT imaging  -Per radiologist, cyst vs. hemangioma. Further follow up per attending    Dementia  Anxiety  -Continue home medications as appropriate    DVT Prophylaxis  -Heparin  -SCDs

## 2024-10-15 NOTE — CARE PLAN
I was notified by patient's RN that family was now at the bedside and wanted to change patient's CODE STATUS to DNR CC.  I went to patient's room and had conversation with family regarding DNR CCA, DNR CC, as well as palliative and hospice options.  Family expresses understanding about the difference between CODE STATUS is and wishes to change patient to DNR CC.  They also request a hospice consult.  Patient's daughter-in-law, who is POA, who request hospice to visit tomorrow around 8-9 a.m. when she will be there.  I filled out DNR CC [place] form in the presence of the family, and form was given to nurse to put in patient's chart.

## 2024-10-15 NOTE — CONSULTS
Vancomycin Dosing by Pharmacy- EMERGENCY DEPARTMENT    Rasheed Antonio is a 84 y.o. year old male who Pharmacy has been consulted to give a ONE TIME ONLY vancomycin dose in the Emergency Department for pneumonia.     There were no vitals taken for this visit.   Lab Results   Component Value Date    CREATININE 2.17 (H) 09/17/2024    CREATININE 2.01 (H) 09/16/2024    CREATININE 2.07 (H) 09/15/2024    CREATININE 2.11 (H) 09/14/2024      Wt Readings from Last 1 Encounters:   10/15/24 59 kg (130 lb)        Assessment/Plan     Patient will be given a one time dose of 1000 mg  Pharmacy will sign off at this time. If vancomycin is to be continued, please re-consult Pharmacy.       Jaci Grace, GudeliaD

## 2024-10-15 NOTE — ED TRIAGE NOTES
PT presents via EMS from SNF for report of shortness of breath and crackling lung sounds. Per facility, pt was dx with COVID two days ago and was started on doxycycline. Pt denies CP, cough. Pt reportsSOB

## 2024-10-15 NOTE — H&P
History Of Present Illness  Rasheed Antonio is a 84 y.o. male with a past medical history of HTN, CKD III, GERD, dementia, and anxiety who presents to the emergency department by ambulance from [facility] for evaluation of shortness of breath.  Of note, patient was recently admitted to the hospital from 9/12-9/17/2024 with dark stools and a hemoglobin of 6.4.  Patient was transfused while hospitalized.  GI was also consulted, and patient had an EGD-results below.  According to EMS and daughter-in-law at the bedside who is assisting with history, patient was recently diagnosed with COVID and has developed worsening shortness of breath over the past couple of days.  She states they went to visit him on Sunday, and he thought he was coming down with a cold.  They tested him for COVID, which was positive.  He also had a fever and a productive cough.  She states he was prescribed doxycycline at the facility yesterday.  He was administered a DuoNeb en route.  Does not wear oxygen at baseline.  Daughter-in-law at the bedside states he has never been on BiPAP before that she knows of.  She states that when she arrived to the hospital, patient was expressing discomfort and his legs.  He denies chest pain, palpitations, nausea/vomiting, diarrhea, urinary symptoms.  Denies history of DVT/PE, hemoptysis, tobacco use, recent travel/surgery.  Denies history of heart failure.  At the time of my interview, patient has BiPAP in place, but is still able to communicate with me and answer questions appropriately.  DNR CCA, no intubation and no ICU status is confirmed with DIL at bedside.  She does wish to speak with palliative and hospice to weigh her options.    EGD 9/13/2024:  Findings:  Severe, generalized grade D esophagitis with mucosal breaks measuring 5 mm or more, continuous between folds, covering 75% or more of the circumference appearing edematous, erythematous, friable and ulcerated that did not appear to have erosion in  the middle third of the esophagus, lower third of the esophagus and GE junction (GE junction)  -Medium sliding hiatal hernia (type I hiatal hernia) without Javier lesions present - GE junction 3 cm from the incisors, confirmed by retroflexion  -The cardia, fundus of the stomach, body of the stomach and antrum appeared normal.  -The duodenal bulb and 2nd part of the duodenum appeared normal.    ED course: On arrival, patient's /83, heart rate 110, respirations 24, afebrile, saturating 100% on BiPAP.  Labs and imaging performed, revealing Cr 2.46 and BUN 37 (2.17 and 28 on 9/17), BNP 1,839, initial troponin 15, 703 and delta troponin 17,429.  No white count.  Hemoglobin 8.8 (9.1 on 9/17).  Platelets WNL.  Blood cultures ordered and pending.  VBG shows PH 7.29, acidotic.  Venous lactate 2.1.  CXR shows extensive multifocal airspace disease present in the right lung and to a lesser extent the left lung base compatible with multifocal pneumonia.  A component of edema may be present as well, per radiologist.  EKG, per ED physician, sinus tachycardia at 112 bpm.  No STEMI.  QTc 549.  Patient initiated on IV cefepime, vancomycin, Flagyl in the ED.  Patient on BiPAP in the ED with 40% FiO2, which was weaned down to 31% per ED RN, Ofe.  DuoNeb given in the ED.  Call out to cardiology was placed to Dr. Valles.  Aspirin given.  Patient to be admitted inpatient under Dr. Sommers.     Past Medical History  Past Medical History:   Diagnosis Date    Personal history of other diseases of the respiratory system 09/28/2016    History of acute bronchitis    Personal history of other endocrine, nutritional and metabolic disease 01/13/2017    History of hyperlipidemia    Unspecified asthma with (acute) exacerbation ([facility]) 08/15/2018    Asthma exacerbation    Vitamin D deficiency, unspecified 02/11/2022    Vitamin D deficiency       Surgical History  No past surgical history on file.     Social History  He reports that he has  never smoked. He has never used smokeless tobacco. He reports that he does not currently use alcohol. He reports that he does not use drugs.    Family History  No family history on file.     Allergies  Grass pollen and Penicillins    Review of Systems   Constitutional:  Positive for chills and fever. Negative for activity change.   HENT:  Positive for rhinorrhea. Negative for congestion and sore throat.    Respiratory:  Positive for cough, shortness of breath and wheezing.    Cardiovascular:  Positive for leg swelling. Negative for chest pain and palpitations.   Gastrointestinal:  Negative for abdominal distention, abdominal pain, constipation, diarrhea, nausea and vomiting.   Genitourinary:  Negative for difficulty urinating and dysuria.   Skin:  Negative for color change.        Physical Exam  Constitutional:       Appearance: He is ill-appearing. He is not toxic-appearing.      Comments: BiPAP in place.  Patient able to answer questions appropriately, daughter-in-law at bedside.   HENT:      Head: Normocephalic and atraumatic.      Nose: Nose normal.      Mouth/Throat:      Mouth: Mucous membranes are dry.      Pharynx: Oropharynx is clear.   Eyes:      Extraocular Movements: Extraocular movements intact.      Conjunctiva/sclera: Conjunctivae normal.      Pupils: Pupils are equal, round, and reactive to light.   Cardiovascular:      Rate and Rhythm: Regular rhythm. Tachycardia present.      Pulses: Normal pulses.   Pulmonary:      Effort: Respiratory distress present.      Breath sounds: Rales (Coarse rales in all lung fields) present.   Abdominal:      General: Bowel sounds are normal.      Tenderness: There is no abdominal tenderness.      Comments: Breathing with use of abdominal muscles   Musculoskeletal:         General: No tenderness. Normal range of motion.      Cervical back: Normal range of motion.      Right lower leg: Edema (2+ pitting edema) present.      Left lower leg: Edema (2+ pitting edema)  present.   Skin:     General: Skin is warm and dry.      Findings: No erythema.   Neurological:      General: No focal deficit present.      Mental Status: He is alert. Mental status is at baseline.   Psychiatric:         Mood and Affect: Mood normal.         Behavior: Behavior normal.          Last Recorded Vitals  Blood pressure 149/73, pulse (!) 115, temperature 37 °C (98.6 °F), temperature source Tympanic, resp. rate (!) 22, weight 59 kg (130 lb), SpO2 95%.    Relevant Results    Scheduled medications  aspirin, 324 mg, oral, Once  oxygen, , inhalation, Continuous - Inhalation      Continuous medications     PRN medications  PRN medications: oxygen    Results for orders placed or performed during the hospital encounter of 10/15/24 (from the past 24 hour(s))   CBC and Auto Differential   Result Value Ref Range    WBC 10.2 4.4 - 11.3 x10*3/uL    nRBC 0.0 0.0 - 0.0 /100 WBCs    RBC 3.31 (L) 4.50 - 5.90 x10*6/uL    Hemoglobin 8.8 (L) 13.5 - 17.5 g/dL    Hematocrit 29.1 (L) 41.0 - 52.0 %    MCV 88 80 - 100 fL    MCH 26.6 26.0 - 34.0 pg    MCHC 30.2 (L) 32.0 - 36.0 g/dL    RDW 13.8 11.5 - 14.5 %    Platelets 186 150 - 450 x10*3/uL    Neutrophils % 78.7 40.0 - 80.0 %    Immature Granulocytes %, Automated 0.5 0.0 - 0.9 %    Lymphocytes % 11.8 13.0 - 44.0 %    Monocytes % 8.7 2.0 - 10.0 %    Eosinophils % 0.0 0.0 - 6.0 %    Basophils % 0.3 0.0 - 2.0 %    Neutrophils Absolute 8.01 (H) 1.60 - 5.50 x10*3/uL    Immature Granulocytes Absolute, Automated 0.05 0.00 - 0.50 x10*3/uL    Lymphocytes Absolute 1.20 0.80 - 3.00 x10*3/uL    Monocytes Absolute 0.89 (H) 0.05 - 0.80 x10*3/uL    Eosinophils Absolute 0.00 0.00 - 0.40 x10*3/uL    Basophils Absolute 0.03 0.00 - 0.10 x10*3/uL   Comprehensive metabolic panel   Result Value Ref Range    Glucose 118 (H) 74 - 99 mg/dL    Sodium 141 136 - 145 mmol/L    Potassium 3.6 3.5 - 5.3 mmol/L    Chloride 109 (H) 98 - 107 mmol/L    Bicarbonate 22 21 - 32 mmol/L    Anion Gap 14 10 - 20 mmol/L     Urea Nitrogen 37 (H) 6 - 23 mg/dL    Creatinine 2.46 (H) 0.50 - 1.30 mg/dL    eGFR 25 (L) >60 mL/min/1.73m*2    Calcium 9.3 8.6 - 10.3 mg/dL    Albumin 4.0 3.4 - 5.0 g/dL    Alkaline Phosphatase 45 33 - 136 U/L    Total Protein 7.4 6.4 - 8.2 g/dL    AST 71 (H) 9 - 39 U/L    Bilirubin, Total 0.4 0.0 - 1.2 mg/dL    ALT 19 10 - 52 U/L   Magnesium   Result Value Ref Range    Magnesium 1.73 1.60 - 2.40 mg/dL   Troponin I, High Sensitivity   Result Value Ref Range    Troponin I, High Sensitivity 15,703 (HH) 0 - 20 ng/L   Protime-INR   Result Value Ref Range    Protime 12.8 9.8 - 12.8 seconds    INR 1.1 0.9 - 1.1   B-Type Natriuretic Peptide   Result Value Ref Range    BNP 1,839 (H) 0 - 99 pg/mL   BLOOD GAS VENOUS FULL PANEL   Result Value Ref Range    POCT pH, Venous 7.29 (L) 7.33 - 7.43 pH    POCT pCO2, Venous 41 41 - 51 mm Hg    POCT pO2, Venous 30 (L) 35 - 45 mm Hg    POCT SO2, Venous 34 (L) 45 - 75 %    POCT Oxy Hemoglobin, Venous 33.3 (L) 45.0 - 75.0 %    POCT Hematocrit Calculated, Venous 27.0 (L) 41.0 - 52.0 %    POCT Sodium, Venous 142 136 - 145 mmol/L    POCT Potassium, Venous 3.8 3.5 - 5.3 mmol/L    POCT Chloride, Venous 112 (H) 98 - 107 mmol/L    POCT Ionized Calicum, Venous 1.23 1.10 - 1.33 mmol/L    POCT Glucose, Venous 120 (H) 74 - 99 mg/dL    POCT Lactate, Venous 2.1 (H) 0.4 - 2.0 mmol/L    POCT Base Excess, Venous -6.4 (L) -2.0 - 3.0 mmol/L    POCT HCO3 Calculated, Venous 19.7 (L) 22.0 - 26.0 mmol/L    POCT Hemoglobin, Venous 8.9 (L) 13.5 - 17.5 g/dL    POCT Anion Gap, Venous 14.0 10.0 - 25.0 mmol/L    Patient Temperature 37.0 degrees Celsius    FiO2 40 %   MRSA Surveillance for Vancomycin De-escalation, PCR    Specimen: Anterior Nares; Swab   Result Value Ref Range    MRSA PCR Not Detected Not Detected   Troponin I, High Sensitivity   Result Value Ref Range    Troponin I, High Sensitivity 17,429 (HH) 0 - 20 ng/L   Lactate   Result Value Ref Range    Lactate 2.5 (H) 0.4 - 2.0 mmol/L     CT chest wo IV  contrast    Result Date: 10/15/2024  Interpreted By:  Nehemiah Gramajo, STUDY: CT CHEST WO IV CONTRAST;  10/15/2024 1:41 pm   INDICATION: Signs/Symptoms:COVID tachy.   COMPARISON: None.   ACCESSION NUMBER(S): [id number]   ORDERING CLINICIAN: SYLVIE CEJA   TECHNIQUE: Helical data acquisition of the chest was obtained  without IV contrast material.  Images were reformatted in axial, coronal, and sagittal planes.   FINDINGS: Patchy ground-glass opacities of the right lung favoring pneumonitis/pneumonia. The left lung fails to demonstrate similar findings typically seen with COVID 19.   Small bilateral pleural effusions right side greater than left. Atherosclerosis of the aorta and tributaries. Coronary calcifications. Heart size upper limits of normal. Prominent lymph nodes of the middle mediastinum, and right and left hilum favoring reactive adenopathy.   Small hiatal hernia.   Limited view of the upper abdomen shows no acute abnormality. There is a 12 mm low-attenuation lesion of the medial segment left lobe of the liver axial image 56 too small to reliably categorize by CT. This may be a cyst or small hemangioma. Follow-up CT with contrast or ultrasound would be helpful for further characterization.   Low-attenuation areas of the upper poles of the kidneys. Findings may reflect cysts or hydronephrosis. Follow-up renal ultrasound recommended.   Extensive atherosclerosis of the mesenteric vessels. Stomach is collapsed; difficult to assess reliably on this examination.     Mild compression of the anterior column of T12, having chronic appearance. Does this patient have acute onset of back pain of the lower thoracic spine? If so, consider follow-up MRI. Arthritic changes of the lower cervical spine with grade 1 anterolisthesis C5 over C6.       1.  Infiltrate/airspace disease right lung base. Left lung is clear. 2. Small to moderate bilateral pleural effusions right side greater than left. 3. Atherosclerosis of  the aorta and tributaries. 4. Reactive mediastinal adenopathy. 5. Indeterminate low attenuation lesion medial segment of the liver. Ultrasound may be helpful for further characterization. 6. Cystic areas of the kidneys limited by the field of view. Hydronephrosis can not be excluded. Ultrasound correlation recommended.   Signed by: Nehemiah Gramajo 10/15/2024 2:43 PM Dictation workstation:   [id number]    XR chest 1 view    Result Date: 10/15/2024  Interpreted By:  Walter Monk, STUDY: XR CHEST 1 VIEW;  10/15/2024 11:16 am   INDICATION: Signs/Symptoms:SOB.     COMPARISON: 09/12/2024   ACCESSION NUMBER(S): [id number]   ORDERING CLINICIAN: SYLVIE CEJA   FINDINGS:         CARDIOMEDIASTINAL SILHOUETTE: Cardiomediastinal silhouette is normal in size and configuration.   LUNGS: Extensive multifocal airspace disease present in the right lung and to a lesser extent the left lung base compatible with multifocal pneumonia. A component of edema may be present as well. No effusion seen   ABDOMEN: No remarkable upper abdominal findings.   BONES: No acute osseous changes.       Multifocal airspace disease predominantly on the right but also in the left lung base concerning for multifocal pneumonia. A component of edema may be present as well. Radiographs follow-up to resolution in 2-3 weeks is advised   MACRO: None   Signed by: Walter Monk 10/15/2024 11:24 AM Dictation workstation:   [id number]          Assessment/Plan   Assessment & Plan  Pneumonia of both lower lobes due to infectious organism  Sepsis (, RR 22)  Multifocal pneumonia, right greater than left  Small bilateral pleural effusions on CT imaging  Pulmonary edema  Shortness of breath  Lactic acidosis  -Pulmonology consult and recommendations appreciated  -Patient was recently diagnosed with COVID 2 days ago, has been experiencing shortness of breath since then  -Patient on continuous BiPAP in the ED, continue use of BiPAP  -Broad-spectrum  antibiotics initiated in the ED, continue  -DuoNebs scheduled, as needed albuterol  -Following blood cultures and respiratory cultures.  Strep pneumo and Legionella antigen ordered and pending  -Defer fluids for now due to rales in bilateral lung fields, pitting edema in BLE, and pulm edema/pleural effusions  -D-dimer added onto labwork due to persisting tachycardia, SOB, tachypnea, and hx of COVID    Elevated high-sensitivity troponins  Elevated BNP, 1839  History of hypertension  Prolonged QTc  -Cardiology was consulted in the ED, recommendations appreciated  -Aspirin 324 mg given one-time dose, 81mg daily starting tomorrow  -EKG, per ED physician, sinus tachycardia at 112 bpm.  No STEMI.  QTc 549  -Initial troponin 15,703, delta troponin 17,429. Third ordered  -Echo ordered  -Will hold Seroquel in the setting of prolonged QTc    DILMA on CKD III  Abnormal CT imaging of kidneys  -Cystic areas of the kidneys limited by the field of view.  Hydronephrosis can not be excluded. Ultrasound correlation recommended.  -Renal US and CT A/P added on  -Cr 2.46 and BUN 37 today ( 2.17 and 28 on 9/17)  -Avoid nephrotoxic medications  -Monitor with BMP in the AM    Lesion of liver on CT imaging  -Per radiologist, cyst vs. hemangioma. Further follow up per attending    Dementia  Anxiety  -Continue home medications as appropriate    DVT Prophylaxis  -Heparin  -SCDs        I spent 80 minutes in the professional and overall care of this patient.      Ofe Pete PA-C

## 2024-10-15 NOTE — PROGRESS NOTES
Pharmacy Medication History Review    Rasheed Antonio is a 84 y.o. male admitted for No Principal Problem: There is no principal problem currently on the Problem List. Please update the Problem List and refresh.. Pharmacy reviewed the patient's [id number] medications and allergies for accuracy.    The list below reflectives the updated PTA list. Please review each medication in order reconciliation for additional clarification and justification.  Prior to Admission medications    Medication Sig Start Date End Date Authorizing Provider   acetaminophen (Tylenol) 325 mg tablet Take 2 tablets (650 mg) by mouth every 8 hours if needed for mild pain (1 - 3) or fever (temp greater than 38.0 C).   Historical Provider, MD   amLODIPine (Norvasc) 10 mg tablet Take 1 tablet (10 mg) by mouth once daily. 9/17/24 10/17/24 Ivan James PA-C   cholecalciferol (Vitamin D-3) 50,000 unit capsule Take 1 capsule (50,000 Units) by mouth 1 (one) time per week. On monday   Betzy Crawford MD   dorzolamide (Trusopt) 2 % ophthalmic solution Administer 1 drop into both eyes 3 times a day.   Historical Provider, MD   doxycycline (Vibra-Tabs) 100 mg tablet Take 1 tablet (100 mg) by mouth 2 times a day. 10/15/24  Historical Provider, MD   guaiFENesin (Mucinex) 600 mg 12 hr tablet Take 2 tablets (1,200 mg) by mouth 2 times a day. Do not crush, chew, or split. 10/15/24  Historical Provider, MD   latanoprost (Xalatan) 0.005 % ophthalmic solution Administer 1 drop into both eyes once daily at bedtime.   Historical Provider, MD   pantoprazole (ProtoNix) 40 mg EC tablet Take 1 tablet (40 mg) by mouth 2 times a day. Do not crush, chew, or split. 9/17/24 10/17/24 Ivan James PA-C   QUEtiapine (SEROquel) 25 mg tablet Take 1 tablet (25 mg) by mouth 2 times a day.   Historical Provider, MD        The list below reflectives the updated allergy list. Please review each documented allergy for additional clarification and  justification.  Allergies  Reviewed by Colleen Ramirez on 10/15/2024        Severity Reactions Comments    Grass Pollen Medium Itching     Penicillins Low Rash             Below are additional concerns with the patient's PTA list.      Colleen Ramirez

## 2024-10-15 NOTE — CARE PLAN
The patient's goals for the shift include      The clinical goals for the shift include  comfort and stability    Over the shift, the patient will maintain stable respiratory status and refrain from further complications

## 2024-10-15 NOTE — CONSULTS
Vancomycin Dosing by Pharmacy- INITIAL    Rasheed Antonio is a 84 y.o. year old male who Pharmacy has been consulted for vancomycin dosing for pneumonia. Based on the patient's indication and renal status this patient will be dosed based on a goal trough/random level of 15-20.     Renal function is currently declining.    Visit Vitals  BP (!) 137/92   Pulse (!) 115   Temp 37 °C (98.6 °F)   Resp (!) 22        Lab Results   Component Value Date    CREATININE 2.46 (H) 10/15/2024    CREATININE 2.17 (H) 2024    CREATININE 2.01 (H) 2024    CREATININE 2.07 (H) 09/15/2024        Patient weight is as follows:   Vitals:    10/15/24 1115   Weight: 59 kg (130 lb)       Cultures:  No results found for the encounter in last 14 days.        No intake/output data recorded.  I/O during current shift:  I/O this shift:  In: 550 [IV Piggyback:550]  Out: -     Temp (24hrs), Av °C (98.6 °F), Min:37 °C (98.6 °F), Max:37 °C (98.6 °F)         Assessment/Plan     Patient has already been given a loading dose of 1000 mg.  Will initiate vancomycin maintenance, a one time dose of 1000 mg.    This dosing regimen is predicted by [employer] to result in the following pharmacokinetic parameters:  Dose by levels.     Follow-up level will be ordered on 10/16 at 1130 unless clinically indicated sooner.  Will continue to monitor renal function daily while on vancomycin and order serum creatinine at least every 48 hours if not already ordered.  Follow for continued vancomycin needs, clinical response, and signs/symptoms of toxicity.       Sergey Ross, PharmD

## 2024-10-16 ENCOUNTER — APPOINTMENT (OUTPATIENT)
Dept: RADIOLOGY | Facility: HOSPITAL | Age: 84
DRG: 871 | End: 2024-10-16
Payer: MEDICARE

## 2024-10-16 ENCOUNTER — APPOINTMENT (OUTPATIENT)
Dept: CARDIOLOGY | Facility: HOSPITAL | Age: 84
DRG: 871 | End: 2024-10-16
Payer: MEDICARE

## 2024-10-16 LAB
ANION GAP SERPL CALC-SCNC: 16 MMOL/L (ref 10–20)
AORTIC VALVE MEAN GRADIENT: 17 MMHG
AORTIC VALVE PEAK VELOCITY: 2.75 M/S
ATRIAL RATE: 113 BPM
AV PEAK GRADIENT: 30.3 MMHG
AVA (PEAK VEL): 1.07 CM2
AVA (VTI): 0.81 CM2
BNP SERPL-MCNC: 1460 PG/ML (ref 0–99)
BUN SERPL-MCNC: 44 MG/DL (ref 6–23)
CALCIUM SERPL-MCNC: 8.5 MG/DL (ref 8.6–10.3)
CHLORIDE SERPL-SCNC: 113 MMOL/L (ref 98–107)
CO2 SERPL-SCNC: 20 MMOL/L (ref 21–32)
CREAT SERPL-MCNC: 2.58 MG/DL (ref 0.5–1.3)
EGFRCR SERPLBLD CKD-EPI 2021: 24 ML/MIN/1.73M*2
EJECTION FRACTION APICAL 4 CHAMBER: 51.1
EJECTION FRACTION: 38 %
ERYTHROCYTE [DISTWIDTH] IN BLOOD BY AUTOMATED COUNT: 14 % (ref 11.5–14.5)
FERRITIN SERPL-MCNC: 43 NG/ML (ref 20–300)
GLUCOSE SERPL-MCNC: 87 MG/DL (ref 74–99)
HCT VFR BLD AUTO: 28.8 % (ref 41–52)
HGB BLD-MCNC: 8.5 G/DL (ref 13.5–17.5)
IRON SATN MFR SERPL: ABNORMAL %
IRON SERPL-MCNC: <10 UG/DL (ref 35–150)
LEFT VENTRICULAR OUTFLOW TRACT DIAMETER: 2.1 CM
LEGIONELLA AG UR QL: NEGATIVE
MCH RBC QN AUTO: 26.3 PG (ref 26–34)
MCHC RBC AUTO-ENTMCNC: 29.5 G/DL (ref 32–36)
MCV RBC AUTO: 89 FL (ref 80–100)
MITRAL VALVE E/A RATIO: 0.42
NRBC BLD-RTO: 0 /100 WBCS (ref 0–0)
P AXIS: 68 DEGREES
PLATELET # BLD AUTO: 182 X10*3/UL (ref 150–450)
POTASSIUM SERPL-SCNC: 3.6 MMOL/L (ref 3.5–5.3)
PR INTERVAL: 132 MS
PSA SERPL-MCNC: 12.74 NG/ML
Q ONSET: 249 MS
QRS COUNT: 17 BEATS
QRS DURATION: 104 MS
QT INTERVAL: 402 MS
QTC CALCULATION(BAZETT): 549 MS
QTC FREDERICIA: 495 MS
R AXIS: 28 DEGREES
RBC # BLD AUTO: 3.23 X10*6/UL (ref 4.5–5.9)
RIGHT VENTRICLE FREE WALL PEAK S': 10.6 CM/S
RIGHT VENTRICLE PEAK SYSTOLIC PRESSURE: 29.6 MMHG
S PNEUM AG UR QL: NEGATIVE
SODIUM SERPL-SCNC: 145 MMOL/L (ref 136–145)
T AXIS: 46 DEGREES
T OFFSET: 450 MS
T4 FREE SERPL-MCNC: 0.86 NG/DL (ref 0.61–1.12)
TIBC SERPL-MCNC: ABNORMAL UG/DL
TRICUSPID ANNULAR PLANE SYSTOLIC EXCURSION: 2.8 CM
TSH SERPL-ACNC: 0.65 MIU/L (ref 0.44–3.98)
UIBC SERPL-MCNC: 321 UG/DL (ref 110–370)
VANCOMYCIN SERPL-MCNC: 9.7 UG/ML (ref 5–20)
VENTRICULAR RATE: 112 BPM
WBC # BLD AUTO: 10.6 X10*3/UL (ref 4.4–11.3)

## 2024-10-16 PROCEDURE — 93325 DOPPLER ECHO COLOR FLOW MAPG: CPT

## 2024-10-16 PROCEDURE — 80202 ASSAY OF VANCOMYCIN: CPT

## 2024-10-16 PROCEDURE — 80048 BASIC METABOLIC PNL TOTAL CA: CPT

## 2024-10-16 PROCEDURE — 84439 ASSAY OF FREE THYROXINE: CPT

## 2024-10-16 PROCEDURE — 84153 ASSAY OF PSA TOTAL: CPT | Mod: PARLAB

## 2024-10-16 PROCEDURE — 36415 COLL VENOUS BLD VENIPUNCTURE: CPT

## 2024-10-16 PROCEDURE — 85027 COMPLETE CBC AUTOMATED: CPT

## 2024-10-16 PROCEDURE — 3E0DX3Z INTRODUCTION OF ANTI-INFLAMMATORY INTO MOUTH AND PHARYNX, EXTERNAL APPROACH: ICD-10-PCS | Performed by: INTERNAL MEDICINE

## 2024-10-16 PROCEDURE — 71045 X-RAY EXAM CHEST 1 VIEW: CPT

## 2024-10-16 PROCEDURE — 71045 X-RAY EXAM CHEST 1 VIEW: CPT | Performed by: STUDENT IN AN ORGANIZED HEALTH CARE EDUCATION/TRAINING PROGRAM

## 2024-10-16 PROCEDURE — 2500000004 HC RX 250 GENERAL PHARMACY W/ HCPCS (ALT 636 FOR OP/ED)

## 2024-10-16 PROCEDURE — 2500000004 HC RX 250 GENERAL PHARMACY W/ HCPCS (ALT 636 FOR OP/ED): Mod: JZ

## 2024-10-16 PROCEDURE — 99222 1ST HOSP IP/OBS MODERATE 55: CPT | Performed by: INTERNAL MEDICINE

## 2024-10-16 PROCEDURE — 84443 ASSAY THYROID STIM HORMONE: CPT

## 2024-10-16 PROCEDURE — 2060000001 HC INTERMEDIATE ICU ROOM DAILY

## 2024-10-16 PROCEDURE — 2500000005 HC RX 250 GENERAL PHARMACY W/O HCPCS: Performed by: EMERGENCY MEDICINE

## 2024-10-16 PROCEDURE — 83540 ASSAY OF IRON: CPT

## 2024-10-16 PROCEDURE — 2500000001 HC RX 250 WO HCPCS SELF ADMINISTERED DRUGS (ALT 637 FOR MEDICARE OP)

## 2024-10-16 PROCEDURE — 82728 ASSAY OF FERRITIN: CPT | Mod: PARLAB

## 2024-10-16 PROCEDURE — 83880 ASSAY OF NATRIURETIC PEPTIDE: CPT

## 2024-10-16 RX ORDER — DEXAMETHASONE SODIUM PHOSPHATE 10 MG/ML
6 INJECTION INTRAMUSCULAR; INTRAVENOUS EVERY 24 HOURS
Status: DISCONTINUED | OUTPATIENT
Start: 2024-10-16 | End: 2024-10-19 | Stop reason: HOSPADM

## 2024-10-16 RX ORDER — FUROSEMIDE 10 MG/ML
20 INJECTION INTRAMUSCULAR; INTRAVENOUS ONCE
Status: COMPLETED | OUTPATIENT
Start: 2024-10-16 | End: 2024-10-16

## 2024-10-16 ASSESSMENT — COGNITIVE AND FUNCTIONAL STATUS - GENERAL
MOBILITY SCORE: 16
DRESSING REGULAR LOWER BODY CLOTHING: A LITTLE
CLIMB 3 TO 5 STEPS WITH RAILING: A LOT
STANDING UP FROM CHAIR USING ARMS: A LITTLE
EATING MEALS: A LITTLE
MOVING FROM LYING ON BACK TO SITTING ON SIDE OF FLAT BED WITH BEDRAILS: A LITTLE
TOILETING: A LITTLE
STANDING UP FROM CHAIR USING ARMS: A LITTLE
EATING MEALS: A LITTLE
TURNING FROM BACK TO SIDE WHILE IN FLAT BAD: A LITTLE
MOVING TO AND FROM BED TO CHAIR: A LITTLE
MOBILITY SCORE: 16
HELP NEEDED FOR BATHING: A LITTLE
CLIMB 3 TO 5 STEPS WITH RAILING: A LOT
PERSONAL GROOMING: A LITTLE
DRESSING REGULAR UPPER BODY CLOTHING: A LITTLE
WALKING IN HOSPITAL ROOM: A LOT
DAILY ACTIVITIY SCORE: 18
MOVING TO AND FROM BED TO CHAIR: A LITTLE
WALKING IN HOSPITAL ROOM: A LOT
TOILETING: A LITTLE
DRESSING REGULAR LOWER BODY CLOTHING: A LITTLE
DRESSING REGULAR UPPER BODY CLOTHING: A LITTLE
MOVING FROM LYING ON BACK TO SITTING ON SIDE OF FLAT BED WITH BEDRAILS: A LITTLE
TURNING FROM BACK TO SIDE WHILE IN FLAT BAD: A LITTLE
HELP NEEDED FOR BATHING: A LITTLE
DAILY ACTIVITIY SCORE: 18
PERSONAL GROOMING: A LITTLE

## 2024-10-16 ASSESSMENT — ENCOUNTER SYMPTOMS
SORE THROAT: 0
PALPITATIONS: 0
VOMITING: 0
DIARRHEA: 0
NAUSEA: 0
CHILLS: 1
DIFFICULTY URINATING: 0
SHORTNESS OF BREATH: 1
CONSTIPATION: 0
ABDOMINAL PAIN: 0
ACTIVITY CHANGE: 0
DYSURIA: 0
FEVER: 1
RHINORRHEA: 1
ABDOMINAL DISTENTION: 0
COLOR CHANGE: 0
COUGH: 1
WHEEZING: 1

## 2024-10-16 ASSESSMENT — PAIN - FUNCTIONAL ASSESSMENT: PAIN_FUNCTIONAL_ASSESSMENT: 0-10

## 2024-10-16 ASSESSMENT — PAIN SCALES - GENERAL
PAINLEVEL_OUTOF10: 0 - NO PAIN
PAINLEVEL_OUTOF10: 0 - NO PAIN

## 2024-10-16 NOTE — PROGRESS NOTES
Physical Therapy                 Therapy Communication Note    Patient Name: Rasheed Antonio  MRN: [id number]  Department: [facility] NONV[facility]  [facility]  Today's Date: 10/16/2024     Discipline: Physical Therapy    Missed Visit Reason: Patient placed on medical hold (Pt with elevated troponins with cardiology consult and hospice consult. Will hold PT eval and follow up once appropriate.)    Missed Time: Attempt

## 2024-10-16 NOTE — H&P
History Of Present Illness  Rasheed Antonio is a 84 y.o. male with a past medical history of HTN, CKD III, GERD, dementia, and anxiety who presents to the emergency department by ambulance from [facility] for evaluation of shortness of breath.  Of note, patient was recently admitted to the hospital from 9/12-9/17/2024 with dark stools and a hemoglobin of 6.4.  Patient was transfused while hospitalized.  GI was also consulted, and patient had an EGD-results below.  According to EMS and daughter-in-law at the bedside who is assisting with history, patient was recently diagnosed with COVID and has developed worsening shortness of breath over the past couple of days.  She states they went to visit him on Sunday, and he thought he was coming down with a cold.  They tested him for COVID, which was positive.  He also had a fever and a productive cough.  She states he was prescribed doxycycline at the facility yesterday.  He was administered a DuoNeb en route.  Does not wear oxygen at baseline.  Daughter-in-law at the bedside states he has never been on BiPAP before that she knows of.  She states that when she arrived to the hospital, patient was expressing discomfort and his legs.  He denies chest pain, palpitations, nausea/vomiting, diarrhea, urinary symptoms.  Denies history of DVT/PE, hemoptysis, tobacco use, recent travel/surgery.  Denies history of heart failure.  At the time of my interview, patient has BiPAP in place, but is still able to communicate with me and answer questions appropriately.  DNR CCA, no intubation and no ICU status is confirmed with DIL at bedside.  She does wish to speak with palliative and hospice to weigh her options.    EGD 9/13/2024:  Findings:  Severe, generalized grade D esophagitis with mucosal breaks measuring 5 mm or more, continuous between folds, covering 75% or more of the circumference appearing edematous, erythematous, friable and ulcerated that did not appear to have erosion in  the middle third of the esophagus, lower third of the esophagus and GE junction (GE junction)  -Medium sliding hiatal hernia (type I hiatal hernia) without Javier lesions present - GE junction 3 cm from the incisors, confirmed by retroflexion  -The cardia, fundus of the stomach, body of the stomach and antrum appeared normal.  -The duodenal bulb and 2nd part of the duodenum appeared normal.    ED course: On arrival, patient's /83, heart rate 110, respirations 24, afebrile, saturating 100% on BiPAP.  Labs and imaging performed, revealing Cr 2.46 and BUN 37 (2.17 and 28 on 9/17), BNP 1,839, initial troponin 15, 703 and delta troponin 17,429.  No white count.  Hemoglobin 8.8 (9.1 on 9/17).  Platelets WNL.  Blood cultures ordered and pending.  VBG shows PH 7.29, acidotic.  Venous lactate 2.1.  CXR shows extensive multifocal airspace disease present in the right lung and to a lesser extent the left lung base compatible with multifocal pneumonia.  A component of edema may be present as well, per radiologist.  EKG, per ED physician, sinus tachycardia at 112 bpm.  No STEMI.  QTc 549.  Patient initiated on IV cefepime, vancomycin, Flagyl in the ED.  Patient on BiPAP in the ED with 40% FiO2, which was weaned down to 31% per ED RNfOe.  DuoNeb given in the ED.  Call out to cardiology was placed to Dr. Valles.  Aspirin given.  Patient to be admitted inpatient under Dr. Sommers.     Past Medical History  Past Medical History:   Diagnosis Date   • Personal history of other diseases of the respiratory system 09/28/2016    History of acute bronchitis   • Personal history of other endocrine, nutritional and metabolic disease 01/13/2017    History of hyperlipidemia   • Unspecified asthma with (acute) exacerbation ([facility]) 08/15/2018    Asthma exacerbation   • Vitamin D deficiency, unspecified 02/11/2022    Vitamin D deficiency       Surgical History  No past surgical history on file.     Social History  He reports that he  has never smoked. He has never used smokeless tobacco. He reports that he does not currently use alcohol. He reports that he does not use drugs.    Family History  No family history on file.     Allergies  Grass pollen and Penicillins    Review of Systems   Constitutional:  Positive for chills and fever. Negative for activity change.   HENT:  Positive for rhinorrhea. Negative for congestion and sore throat.    Respiratory:  Positive for cough, shortness of breath and wheezing.    Cardiovascular:  Positive for leg swelling. Negative for chest pain and palpitations.   Gastrointestinal:  Negative for abdominal distention, abdominal pain, constipation, diarrhea, nausea and vomiting.   Genitourinary:  Negative for difficulty urinating and dysuria.   Skin:  Negative for color change.        Physical Exam  Constitutional:       Appearance: He is ill-appearing. He is not toxic-appearing.      Comments: BiPAP in place.  Patient able to answer questions appropriately, daughter-in-law at bedside.   HENT:      Head: Normocephalic and atraumatic.      Nose: Nose normal.      Mouth/Throat:      Mouth: Mucous membranes are dry.      Pharynx: Oropharynx is clear.   Eyes:      Extraocular Movements: Extraocular movements intact.      Conjunctiva/sclera: Conjunctivae normal.      Pupils: Pupils are equal, round, and reactive to light.   Cardiovascular:      Rate and Rhythm: Regular rhythm. Tachycardia present.      Pulses: Normal pulses.   Pulmonary:      Effort: Respiratory distress present.      Breath sounds: Rales (Coarse rales in all lung fields) present.   Abdominal:      General: Bowel sounds are normal.      Tenderness: There is no abdominal tenderness.      Comments: Breathing with use of abdominal muscles   Musculoskeletal:         General: No tenderness. Normal range of motion.      Cervical back: Normal range of motion.      Right lower leg: Edema (2+ pitting edema) present.      Left lower leg: Edema (2+ pitting edema)  present.   Skin:     General: Skin is warm and dry.      Findings: No erythema.   Neurological:      General: No focal deficit present.      Mental Status: He is alert. Mental status is at baseline.   Psychiatric:         Mood and Affect: Mood normal.         Behavior: Behavior normal.        Last Recorded Vitals  Blood pressure 131/72, pulse 99, temperature 36.4 °C (97.5 °F), resp. rate 24, weight 59 kg (130 lb), SpO2 95%.    Relevant Results    Scheduled medications  amLODIPine, 10 mg, oral, Daily  aspirin, 324 mg, oral, Once  aspirin, 81 mg, oral, Daily  cefepime, 2 g, intravenous, q24h  dorzolamide, 1 drop, Both Eyes, TID  guaiFENesin, 1,200 mg, oral, BID  heparin (porcine), 5,000 Units, subcutaneous, q8h  latanoprost, 1 drop, Both Eyes, Nightly  oxygen, , inhalation, Continuous - Inhalation  pantoprazole, 40 mg, oral, BID  perflutren lipid microspheres, 0.5-10 mL of dilution, intravenous, Once in imaging  perflutren protein A microsphere, 0.5 mL, intravenous, Once in imaging  [Held by provider] QUEtiapine, 25 mg, oral, BID  sulfur hexafluoride microsphr, 2 mL, intravenous, Once in imaging      Continuous medications     PRN medications  PRN medications: acetaminophen **OR** acetaminophen **OR** acetaminophen, albuterol, glycopyrrolate, ipratropium-albuteroL, oxygen, polyethylene glycol, vancomycin    Results for orders placed or performed during the hospital encounter of 10/15/24 (from the past 24 hours)   CBC and Auto Differential   Result Value Ref Range    WBC 10.2 4.4 - 11.3 x10*3/uL    nRBC 0.0 0.0 - 0.0 /100 WBCs    RBC 3.31 (L) 4.50 - 5.90 x10*6/uL    Hemoglobin 8.8 (L) 13.5 - 17.5 g/dL    Hematocrit 29.1 (L) 41.0 - 52.0 %    MCV 88 80 - 100 fL    MCH 26.6 26.0 - 34.0 pg    MCHC 30.2 (L) 32.0 - 36.0 g/dL    RDW 13.8 11.5 - 14.5 %    Platelets 186 150 - 450 x10*3/uL    Neutrophils % 78.7 40.0 - 80.0 %    Immature Granulocytes %, Automated 0.5 0.0 - 0.9 %    Lymphocytes % 11.8 13.0 - 44.0 %    Monocytes %  8.7 2.0 - 10.0 %    Eosinophils % 0.0 0.0 - 6.0 %    Basophils % 0.3 0.0 - 2.0 %    Neutrophils Absolute 8.01 (H) 1.60 - 5.50 x10*3/uL    Immature Granulocytes Absolute, Automated 0.05 0.00 - 0.50 x10*3/uL    Lymphocytes Absolute 1.20 0.80 - 3.00 x10*3/uL    Monocytes Absolute 0.89 (H) 0.05 - 0.80 x10*3/uL    Eosinophils Absolute 0.00 0.00 - 0.40 x10*3/uL    Basophils Absolute 0.03 0.00 - 0.10 x10*3/uL   Comprehensive metabolic panel   Result Value Ref Range    Glucose 118 (H) 74 - 99 mg/dL    Sodium 141 136 - 145 mmol/L    Potassium 3.6 3.5 - 5.3 mmol/L    Chloride 109 (H) 98 - 107 mmol/L    Bicarbonate 22 21 - 32 mmol/L    Anion Gap 14 10 - 20 mmol/L    Urea Nitrogen 37 (H) 6 - 23 mg/dL    Creatinine 2.46 (H) 0.50 - 1.30 mg/dL    eGFR 25 (L) >60 mL/min/1.73m*2    Calcium 9.3 8.6 - 10.3 mg/dL    Albumin 4.0 3.4 - 5.0 g/dL    Alkaline Phosphatase 45 33 - 136 U/L    Total Protein 7.4 6.4 - 8.2 g/dL    AST 71 (H) 9 - 39 U/L    Bilirubin, Total 0.4 0.0 - 1.2 mg/dL    ALT 19 10 - 52 U/L   Magnesium   Result Value Ref Range    Magnesium 1.73 1.60 - 2.40 mg/dL   Troponin I, High Sensitivity   Result Value Ref Range    Troponin I, High Sensitivity 15,703 (HH) 0 - 20 ng/L   Protime-INR   Result Value Ref Range    Protime 12.8 9.8 - 12.8 seconds    INR 1.1 0.9 - 1.1   B-Type Natriuretic Peptide   Result Value Ref Range    BNP 1,839 (H) 0 - 99 pg/mL   BLOOD GAS VENOUS FULL PANEL   Result Value Ref Range    POCT pH, Venous 7.29 (L) 7.33 - 7.43 pH    POCT pCO2, Venous 41 41 - 51 mm Hg    POCT pO2, Venous 30 (L) 35 - 45 mm Hg    POCT SO2, Venous 34 (L) 45 - 75 %    POCT Oxy Hemoglobin, Venous 33.3 (L) 45.0 - 75.0 %    POCT Hematocrit Calculated, Venous 27.0 (L) 41.0 - 52.0 %    POCT Sodium, Venous 142 136 - 145 mmol/L    POCT Potassium, Venous 3.8 3.5 - 5.3 mmol/L    POCT Chloride, Venous 112 (H) 98 - 107 mmol/L    POCT Ionized Calicum, Venous 1.23 1.10 - 1.33 mmol/L    POCT Glucose, Venous 120 (H) 74 - 99 mg/dL    POCT Lactate,  Venous 2.1 (H) 0.4 - 2.0 mmol/L    POCT Base Excess, Venous -6.4 (L) -2.0 - 3.0 mmol/L    POCT HCO3 Calculated, Venous 19.7 (L) 22.0 - 26.0 mmol/L    POCT Hemoglobin, Venous 8.9 (L) 13.5 - 17.5 g/dL    POCT Anion Gap, Venous 14.0 10.0 - 25.0 mmol/L    Patient Temperature 37.0 degrees Celsius    FiO2 40 %   Blood Culture    Specimen: Peripheral Venipuncture; Blood culture   Result Value Ref Range    Blood Culture Loaded on Instrument - Culture in progress    ECG 12 lead   Result Value Ref Range    Ventricular Rate 112 BPM    Atrial Rate 113 BPM    WA Interval 132 ms    QRS Duration 104 ms    QT Interval 402 ms    QTC Calculation(Bazett) 549 ms    P Axis 68 degrees    R Axis 28 degrees    T Axis 46 degrees    QRS Count 17 beats    Q Onset 249 ms    T Offset 450 ms    QTC Fredericia 495 ms   Blood Culture    Specimen: Peripheral Venipuncture; Blood culture   Result Value Ref Range    Blood Culture Loaded on Instrument - Culture in progress    MRSA Surveillance for Vancomycin De-escalation, PCR    Specimen: Anterior Nares; Swab   Result Value Ref Range    MRSA PCR Not Detected Not Detected   Troponin I, High Sensitivity   Result Value Ref Range    Troponin I, High Sensitivity 17,429 (HH) 0 - 20 ng/L   Lactate   Result Value Ref Range    Lactate 2.5 (H) 0.4 - 2.0 mmol/L   Lactate   Result Value Ref Range    Lactate 1.9 0.4 - 2.0 mmol/L   SST TOP   Result Value Ref Range    Extra Tube Hold for add-ons.    Troponin I, High Sensitivity   Result Value Ref Range    Troponin I, High Sensitivity 19,245 (HH) 0 - 20 ng/L   D-dimer, VTE Exclusion   Result Value Ref Range    D-Dimer, Quantitative VTE Exclusion 1,157 (H) <=500 ng/mL FEU   Sars-CoV-2 PCR   Result Value Ref Range    Coronavirus 2019, PCR Detected (A) Not Detected   CBC   Result Value Ref Range    WBC 10.6 4.4 - 11.3 x10*3/uL    nRBC 0.0 0.0 - 0.0 /100 WBCs    RBC 3.23 (L) 4.50 - 5.90 x10*6/uL    Hemoglobin 8.5 (L) 13.5 - 17.5 g/dL    Hematocrit 28.8 (L) 41.0 - 52.0 %     MCV 89 80 - 100 fL    MCH 26.3 26.0 - 34.0 pg    MCHC 29.5 (L) 32.0 - 36.0 g/dL    RDW 14.0 11.5 - 14.5 %    Platelets 182 150 - 450 x10*3/uL   Basic metabolic panel   Result Value Ref Range    Glucose 87 74 - 99 mg/dL    Sodium 145 136 - 145 mmol/L    Potassium 3.6 3.5 - 5.3 mmol/L    Chloride 113 (H) 98 - 107 mmol/L    Bicarbonate 20 (L) 21 - 32 mmol/L    Anion Gap 16 10 - 20 mmol/L    Urea Nitrogen 44 (H) 6 - 23 mg/dL    Creatinine 2.58 (H) 0.50 - 1.30 mg/dL    eGFR 24 (L) >60 mL/min/1.73m*2    Calcium 8.5 (L) 8.6 - 10.3 mg/dL     ECG 12 lead    Result Date: 10/16/2024  Fast sinus arrhythmia Repol abnrm, severe global ischemia (LM/MVD) Prolonged QT interval    US renal complete    Result Date: 10/15/2024  Interpreted By:  June Almeida, STUDY: US RENAL COMPLETE; 10/15/2024 5:58 pm   INDICATION: Signs/Symptoms:Possible hydronephrosis on CT imaging.   COMPARISON: None.   ACCESSION NUMBER(S): [id number]   ORDERING CLINICIAN: ADIA HERNANDEZ   TECHNIQUE: Grayscale and color Doppler imaging of the kidneys and urinary bladder.   FINDINGS: The right kidney measures 11.6 cm. There is marked right pelvocaliectasis and ureterectasis.     The left kidney measures 10.7 cm. There is marked left pelvocaliectasis and ureterectasis.   There is no shadowing calculus or solid mass noted. There are areas of renal cortical thinning bilaterally probably due to scarring..   Limited bladder evaluation: Urinary bladder is markedly distended with a urinary bladder diverticulum noted. Small amount of debris seen in the urinary bladder.       1. Markedly distended urinary bladder with marked bilateral pelvocaliectasis and ureterectasis. Consider bladder outlet obstruction. Dan catheter placement and decompression of urinary bladder with repeat renal imaging may be helpful. 2. Urinary bladder debris noted with a small urinary bladder diverticulum   .   MACRO: Critical Finding:  See findings. Notification was initiated on 10/15/2024 at  11:59 pm by  June Almeida.  (**-OCF-**) Instructions:   Signed by: June Almeida 10/15/2024 11:59 PM Dictation workstation:   [id number]    Lower extremity venous duplex bilateral    Result Date: 10/15/2024  Interpreted By:  June Almeida, STUDY: [facility] LOWER EXTREMITY VENOUS DUPLEX BILATERAL; 10/15/2024 5:47 pm   INDICATION: Signs/Symptoms:Leg pain, COVID.   COMPARISON: None.   ACCESSION NUMBER(S): [id number]   ORDERING CLINICIAN: ADIA HERNANDEZ   TECHNIQUE: 2D grayscale and color-flow duplex images were obtained along with Doppler spectral waveform analysis of the deep venous system of the both lower extremities from the groin to the knee. Attempts were made to image the calf veins. Venous compression and augmentation were performed.   FINDINGS: Right Lower Extremity: There is normal compressibility and flow within the visualized vessels of the deep venous system of this lower extremity.   Left lower Extremity: There is normal compressibility and flow within the visualized vessels of the deep venous system of this lower extremity.         No sign of acute deep venous thrombosis in the lower extremities.     MACRO: none   Signed by: June Almeida 10/15/2024 8:31 PM Dictation workstation:   [id number]    CT chest wo IV contrast    Result Date: 10/15/2024  Interpreted By:  Nehemiah Gramajo, STUDY: CT CHEST WO IV CONTRAST;  10/15/2024 1:41 pm   INDICATION: Signs/Symptoms:COVID tachy.   COMPARISON: None.   ACCESSION NUMBER(S): [id number]   ORDERING CLINICIAN: SYLVIE CEJA   TECHNIQUE: Helical data acquisition of the chest was obtained  without IV contrast material.  Images were reformatted in axial, coronal, and sagittal planes.   FINDINGS: Patchy ground-glass opacities of the right lung favoring pneumonitis/pneumonia. The left lung fails to demonstrate similar findings typically seen with COVID 19.   Small bilateral pleural effusions right side greater than left. Atherosclerosis of the aorta and tributaries.  Coronary calcifications. Heart size upper limits of normal. Prominent lymph nodes of the middle mediastinum, and right and left hilum favoring reactive adenopathy.   Small hiatal hernia.   Limited view of the upper abdomen shows no acute abnormality. There is a 12 mm low-attenuation lesion of the medial segment left lobe of the liver axial image 56 too small to reliably categorize by CT. This may be a cyst or small hemangioma. Follow-up CT with contrast or ultrasound would be helpful for further characterization.   Low-attenuation areas of the upper poles of the kidneys. Findings may reflect cysts or hydronephrosis. Follow-up renal ultrasound recommended.   Extensive atherosclerosis of the mesenteric vessels. Stomach is collapsed; difficult to assess reliably on this examination.     Mild compression of the anterior column of T12, having chronic appearance. Does this patient have acute onset of back pain of the lower thoracic spine? If so, consider follow-up MRI. Arthritic changes of the lower cervical spine with grade 1 anterolisthesis C5 over C6.       1.  Infiltrate/airspace disease right lung base. Left lung is clear. 2. Small to moderate bilateral pleural effusions right side greater than left. 3. Atherosclerosis of the aorta and tributaries. 4. Reactive mediastinal adenopathy. 5. Indeterminate low attenuation lesion medial segment of the liver. Ultrasound may be helpful for further characterization. 6. Cystic areas of the kidneys limited by the field of view. Hydronephrosis can not be excluded. Ultrasound correlation recommended.   Signed by: Nehemiah Gramajo 10/15/2024 2:43 PM Dictation workstation:   [id number]    XR chest 1 view    Result Date: 10/15/2024  Interpreted By:  Walter Monk, STUDY: XR CHEST 1 VIEW;  10/15/2024 11:16 am   INDICATION: Signs/Symptoms:SOB.     COMPARISON: 09/12/2024   ACCESSION NUMBER(S): [id number]   ORDERING CLINICIAN: SYLVIE CEJA   FINDINGS:         CARDIOMEDIASTINAL  SILHOUETTE: Cardiomediastinal silhouette is normal in size and configuration.   LUNGS: Extensive multifocal airspace disease present in the right lung and to a lesser extent the left lung base compatible with multifocal pneumonia. A component of edema may be present as well. No effusion seen   ABDOMEN: No remarkable upper abdominal findings.   BONES: No acute osseous changes.       Multifocal airspace disease predominantly on the right but also in the left lung base concerning for multifocal pneumonia. A component of edema may be present as well. Radiographs follow-up to resolution in 2-3 weeks is advised   MACRO: None   Signed by: Walter Monk 10/15/2024 11:24 AM Dictation workstation:   [id number]          Assessment/Plan   Assessment & Plan  Pneumonia of both lower lobes due to infectious organism  Sepsis (, RR 22)  Multifocal pneumonia, right greater than left  Small bilateral pleural effusions on CT imaging  Pulmonary edema  Shortness of breath  Lactic acidosis  -Pulmonology consult and recommendations appreciated  -Patient was recently diagnosed with COVID 2 days ago, has been experiencing shortness of breath since then  -Patient on continuous BiPAP in the ED, continue use of BiPAP  -Broad-spectrum antibiotics initiated in the ED, continue  -DuoNebs scheduled, as needed albuterol  -Following blood cultures and respiratory cultures.  Strep pneumo and Legionella antigen ordered and pending  -Defer fluids for now due to rales in bilateral lung fields, pitting edema in BLE, and pulm edema/pleural effusions  -D-dimer added onto labwork due to persisting tachycardia, SOB, tachypnea, and hx of COVID    Elevated high-sensitivity troponins  Elevated BNP, 1839  History of hypertension  Prolonged QTc  -Cardiology was consulted in the ED, recommendations appreciated  -Aspirin 324 mg given one-time dose, 81mg daily starting tomorrow  -EKG, per ED physician, sinus tachycardia at 112 bpm.  No STEMI.  QTc  549  -Initial troponin 15,703, delta troponin 17,429. Third ordered  -Echo ordered  -Will hold Seroquel in the setting of prolonged QTc    DILMA on CKD III  Abnormal CT imaging of kidneys  -Cystic areas of the kidneys limited by the field of view.  Hydronephrosis can not be excluded. Ultrasound correlation recommended.  -Renal US and CT A/P added on  -Cr 2.46 and BUN 37 today ( 2.17 and 28 on 9/17)  -Avoid nephrotoxic medications  -Monitor with BMP in the AM    Lesion of liver on CT imaging  -Per radiologist, cyst vs. hemangioma. Further follow up per attending    Dementia  Anxiety  -Continue home medications as appropriate    DVT Prophylaxis  -Heparin  -SCDs        I spent 80 minutes in the professional and overall care of this patient.      Luc Sommers, DO

## 2024-10-16 NOTE — CARE PLAN
Problem: Safety - Adult  Goal: Free from fall injury  Outcome: Progressing     Problem: Skin  Goal: Prevent/minimize sheer/friction injuries  Outcome: Progressing  Flowsheets (Taken 10/15/2024 2130)  Prevent/minimize sheer/friction injuries:   Turn/reposition every 2 hours/use positioning/transfer devices   HOB 30 degrees or less   Complete micro-shifts as needed if patient unable. Adjust patient position to relieve pressure points, not a full turn  Goal: Promote/optimize nutrition  Outcome: Progressing

## 2024-10-16 NOTE — PROGRESS NOTES
10/16/24 0916   Discharge Planning   Living Arrangements Other (Comment)  (From an Assisted Living)   Support Systems Family members   Assistance Needed Received hospice consult. FRANCISCO called and spoke with pt MARLYN Mcgovern to discuss dispo plan. Pt is from [facility]. Dtr would like to see if pt can return to [facility] with hospice. FRANCISCO called and left a message for Delia at [facility] [phone] to see if pt is able to return there with hospice. Waiting on call back from [facility]. If there is not a particular hospice company that [facility] works with, dtr would like [facility] as hospice choice.   Type of Residence Other (Comment)  (Assisted Living)   Do you have animals or pets at home? No   Who is requesting discharge planning? Provider   Home or Post Acute Services Community services   Expected Discharge Disposition [facility]ospiceHome   Does the patient need discharge transport arranged? Yes   Has discharge transport been arranged? No     ADDED 3555: FRANCISCO received call back from Delia at [facility] who states that pt is able to return to their AL as long as pt doesnot have wounds or is on any IV abx. Per Delia, dtr in law is agreeable to referral to their hospice company which is [facility]. Dtr is requesting a call from medical team before making a decision on hospice. FRANCISCO called and spoke with  who is aware and will follow up with dtr on dispo plan.     ADDED 2182: FRANCISCO called and left message with pt dtr in law to confirm referral to hospice- waiting on call back.

## 2024-10-16 NOTE — CONSULTS
Inpatient consult to Pulmonology  Consult performed by: Jw Nam MD  Consult ordered by: Ofe Pete PA-C  Reason for consult: Multifocal PNA          History Of Present Illness  Rasheed Antonio is a 84 y.o. male with history of dementia, GERD, HTN who presents with shortness of breath from his SNF.  History is obtained through chart as patient is unable to recall previous events.  COVID PCR was positive for outpatient.  He is breathing comfortably, denies shortness of breath, chest pain, lightheadedness, or general discomfort.  His cough is improving.  On day of consult, the patient is breathing on 6 L nasal cannula.  Troponins bhavin to 19,245-15,703.  Creatinine elevated at 2.58 (baseline 1.2) shortly after admission, patient and family requested transition to hospice.     Past Medical History  As above    Surgical History  He has no past surgical history on file.     Social History  He reports that he has never smoked. He has never used smokeless tobacco. He reports that he does not currently use alcohol. He reports that he does not use drugs.    Family History  No family history on file.     Allergies  Grass pollen and Penicillins    Review of Systems  A complete 10 point review of systems was completed and is otherwise negative unless stated.     Physical Exam  Physical Exam:  General:  Pleasant and cooperative. No apparent distress.  HEENT:  Normocephalic, atraumatic, mucus membranes moist.   Neck:  Trachea midline.  Chest: Diffuse rales  CV:  Regular rate and rhythm.  Positive S1/S2.   Abdomen: Abdomen is soft, non-tender, non-distended.  Extremities:  No lower extremity edema or cyanosis.   Neurological:  AAOx3. No focal deficits.  Skin:  Warm and dry.         Last Recorded Vitals  /72   Pulse 99   Temp 36.4 °C (97.5 °F)   Resp 24   Wt 59 kg (130 lb)   SpO2 95%          Assessment/Plan     Pulmonary edema, suspected secondary to NSTEMI  COVID pneumonia  Pneumonia, hospital acquired,  possible  Acute hypoxic respiratory failure  Acute systolic heart failure, EF 35-40%  Ischemic cardiomyopathy, suspected  Dementia  GERD  HTN    Patient pursuing hospice, appropriate given recent cardiac event  Give 20 mg IV Lasix once today for comfort and ease of breathing  Start dexamethasone to ease inflammation  Start azithromycin while inpatient  Continue vancomycin and cefepime  Oxygen for saturation of 89 to 94%  Incentive spirometry  Bronchodilators therapy as needed  DVT prophylaxis  Head evaluation and aspiration precautions.    This is a preliminary note, please await attending attestation for a finalized plan.    Dr. Jw Nam  Internal Medicine PGY-3  Please message me with any questions

## 2024-10-16 NOTE — PROGRESS NOTES
Occupational Therapy                 Therapy Communication Note    Patient Name: Rasheed Antonio  MRN: [id number]  Department: [facility] 8  Room: [facility]/820-  Today's Date: 10/16/2024     Discipline: Occupational Therapy    Missed Visit Reason: Missed Visit Reason:  (Case conference with PT who spoke with RN.  Patient on hold due to elevated troponins with cardiology consult and hospice consult.  Plan:  Re-check and attempt when medically appropriate.)    Missed Time: Attempt

## 2024-10-16 NOTE — PROGRESS NOTES
Vancomycin Dosing by Pharmacy- Cessation of Therapy    Consult to pharmacy for vancomycin dosing has been discontinued by the prescriber, pharmacy will sign off at this time.    Please call pharmacy if there are further questions or re-enter a consult if vancomycin is resumed.     Kelsey Mueller, PharmD

## 2024-10-16 NOTE — CARE PLAN
The patient's goals for the shift include      The clinical goals for the shift include safety, comfort      Problem: Safety - Adult  Goal: Free from fall injury  Outcome: Progressing     Problem: Skin  Goal: Decreased wound size/increased tissue granulation at next dressing change  Outcome: Progressing  Goal: Participates in plan/prevention/treatment measures  Outcome: Progressing  Goal: Prevent/manage excess moisture  Outcome: Progressing  Goal: Prevent/minimize sheer/friction injuries  Outcome: Progressing  Goal: Promote/optimize nutrition  Outcome: Progressing  Goal: Promote skin healing  Outcome: Progressing

## 2024-10-17 PROCEDURE — 2500000005 HC RX 250 GENERAL PHARMACY W/O HCPCS

## 2024-10-17 PROCEDURE — 2500000004 HC RX 250 GENERAL PHARMACY W/ HCPCS (ALT 636 FOR OP/ED)

## 2024-10-17 PROCEDURE — 2500000004 HC RX 250 GENERAL PHARMACY W/ HCPCS (ALT 636 FOR OP/ED): Mod: JZ

## 2024-10-17 PROCEDURE — 2500000005 HC RX 250 GENERAL PHARMACY W/O HCPCS: Performed by: EMERGENCY MEDICINE

## 2024-10-17 PROCEDURE — 2500000001 HC RX 250 WO HCPCS SELF ADMINISTERED DRUGS (ALT 637 FOR MEDICARE OP)

## 2024-10-17 PROCEDURE — 2060000001 HC INTERMEDIATE ICU ROOM DAILY

## 2024-10-17 PROCEDURE — 99232 SBSQ HOSP IP/OBS MODERATE 35: CPT | Performed by: INTERNAL MEDICINE

## 2024-10-17 ASSESSMENT — COGNITIVE AND FUNCTIONAL STATUS - GENERAL
PERSONAL GROOMING: A LITTLE
MOVING FROM LYING ON BACK TO SITTING ON SIDE OF FLAT BED WITH BEDRAILS: A LITTLE
STANDING UP FROM CHAIR USING ARMS: A LITTLE
HELP NEEDED FOR BATHING: A LITTLE
MOBILITY SCORE: 16
MOVING TO AND FROM BED TO CHAIR: A LITTLE
TOILETING: A LITTLE
TURNING FROM BACK TO SIDE WHILE IN FLAT BAD: A LITTLE
DRESSING REGULAR UPPER BODY CLOTHING: A LITTLE
WALKING IN HOSPITAL ROOM: A LOT
DRESSING REGULAR LOWER BODY CLOTHING: A LITTLE
DAILY ACTIVITIY SCORE: 18
CLIMB 3 TO 5 STEPS WITH RAILING: A LOT
EATING MEALS: A LITTLE

## 2024-10-17 ASSESSMENT — PAIN SCALES - GENERAL
PAINLEVEL_OUTOF10: 0 - NO PAIN
PAINLEVEL_OUTOF10: 0 - NO PAIN

## 2024-10-17 ASSESSMENT — PAIN - FUNCTIONAL ASSESSMENT: PAIN_FUNCTIONAL_ASSESSMENT: 0-10

## 2024-10-17 NOTE — PROGRESS NOTES
Subjective   Patient breathing comfortably on nasal cannula. Denies discomfort, chest pain, shortness of breath.       Objective     Last Recorded Vitals  /80 (BP Location: Left arm, Patient Position: Lying)   Pulse 76   Temp 36.2 °C (97.2 °F) (Temporal)   Resp 20   Wt 59 kg (130 lb)   SpO2 100%   Intake/Output last 3 Shifts:    Intake/Output Summary (Last 24 hours) at 10/17/2024 1659  Last data filed at 10/17/2024 1455  Gross per 24 hour   Intake 0 ml   Output --   Net 0 ml       Admission Weight  Weight: 63.5 kg (140 lb) (10/15/24 1100)    Daily Weight  10/15/24 : 59 kg (130 lb)      Physical Exam  HENT:      Head: Normocephalic and atraumatic.      Mouth/Throat:      Mouth: Mucous membranes are moist.   Eyes:      Extraocular Movements: Extraocular movements intact.   Cardiovascular:      Rate and Rhythm: Normal rate and regular rhythm.   Pulmonary:      Effort: Pulmonary effort is normal.      Breath sounds: Rales present.   Abdominal:      General: Abdomen is flat. There is no distension.      Palpations: Abdomen is soft.   Musculoskeletal:      Right lower leg: No edema.      Left lower leg: No edema.   Skin:     General: Skin is warm and dry.   Neurological:      General: No focal deficit present.      Mental Status: He is alert.   Psychiatric:         Mood and Affect: Mood normal.           Assessment/Plan     Pulmonary edema, suspected secondary to NSTEMI  COVID pneumonia  Pneumonia, hospital acquired, possible  Acute hypoxic respiratory failure  Acute systolic heart failure, EF 35-40%  Ischemic cardiomyopathy, suspected  Dementia  GERD  HTN     Patient pursuing hospice, appropriate given recent cardiac event  Continue dexamethasone inpatient to ease inflammation   Continue azithromycin while inpatient  Continue vancomycin and cefepime inpatient  Oxygen for saturation of 89 to 94%  Incentive spirometry  Bronchodilators therapy as needed  DVT prophylaxis  Head evaluation and aspiration  precautions.     This is a preliminary note, please await attending attestation for a finalized plan.     Dr. Jw Nam  Internal Medicine PGY-3  Please message me with any questions

## 2024-10-17 NOTE — PROGRESS NOTES
Occupational Therapy                 Therapy Communication Note    Patient Name: Rasheed Antonio  MRN: [id number]  Department: [facility]  Room: 82/820-A  Today's Date: 10/17/2024     Discipline: Occupational Therapy    Missed Visit Reason: Missed Visit Reason:  (Attempted re-check at 10:25 am.  Patient on hold:  awaiting decision regarding hospice and cardiology consult.)    Missed Time: Attempt

## 2024-10-17 NOTE — PROGRESS NOTES
Ilan Bundy is a 84 y.o. male on day 2 of admission presenting with Pneumonia of both lower lobes due to infectious organism.      Subjective   No events overnight. Patient seen and examined at bedside. No complaints.       Objective     Last Recorded Vitals  /73 (BP Location: Left arm, Patient Position: Lying)   Pulse 77   Temp 36.1 °C (97 °F) (Temporal)   Resp 20   Wt 59 kg (130 lb)   SpO2 100%   Intake/Output last 3 Shifts:    Intake/Output Summary (Last 24 hours) at 10/17/2024 1451  Last data filed at 10/17/2024 0928  Gross per 24 hour   Intake 0 ml   Output 350 ml   Net -350 ml       Admission Weight  Weight: 63.5 kg (140 lb) (10/15/24 1100)    Daily Weight  10/15/24 : 59 kg (130 lb)    Image Results  ECG 12 lead  Fast sinus arrhythmia  Repol abnrm, severe global ischemia (LM/MVD)  Prolonged QT interval    See ED provider note for full interpretation and clinical correlation  Confirmed by Jenny Shields ([id number]) on 10/16/2024 4:30:14 PM  Transthoracic Echo (TTE) [facility], [address] [place] [address]            Tel [phone] and Fax [phone]    TRANSTHORACIC ECHOCARDIOGRAM REPORT       Patient Name:      ILAN BUNDY       Reading Physician:    [id number] Paolo Valles MD  Study Date:        10/16/2024           Ordering Provider:    [id number] ADIA HERNANDEZ  MRN/PID:           [id number]             Fellow:  Accession#:        [id number]         Nurse:  Date of Birth/Age: 1940 / 84 years Sonographer:          Deysi Aguilar RDCS  Gender:            M                    Additional Staff:  Height:            162.56 cm            Admit Date:           10/15/2024  Weight:            58.97 kg             Admission Status:     Inpatient -                                                                Routine  BSA / BMI:         1.63 m2 /  22.31      Encounter#:           [id number]                     kg/m2  Blood Pressure:    118/65 mmHg          Department Location:  [facility] 8th floor                                                                ICU Stepdown    Study Type:    TRANSTHORACIC ECHO (TTE) COMPLETE  Diagnosis/ICD: Shortness of breath-R06.02; Elevated Troponin-R79.89  Indication:    Elevated BNP COVID Positive  CPT Code:      Echo Limited-45333; Doppler Limited-78816; Color Doppler-97372    Patient History:  Pertinent History: HTN. CKD III, Dementia.    Study Detail: The following Echo studies were performed: 2D, Doppler and color                flow. Technically challenging study due to patient lying in supine                position and the patient's lack of cooperation.       PHYSICIAN INTERPRETATION:  Left Ventricle: The left ventricular systolic function is moderately decreased, with a visually estimated ejection fraction of 35-40%. Left venticular wall motion is abnormal. The left ventricular cavity size is normal. Left ventricular diastolic filling was indeterminate. There is evidence of a moderate posterior wall myocardial infarction.  Left Atrium: The left atrium is mildly dilated.  Right Ventricle: The right ventricle is normal in size. There is normal right ventricular global systolic function.  Right Atrium: The right atrium is mildly dilated.  Aortic Valve: The aortic valve is trileaflet. There is evidence of moderate aortic valve stenosis. The aortic valve dimensionless index is 0.23. There is trace aortic valve regurgitation. The peak instantaneous gradient of the aortic valve is 30.2 mmHg. The mean gradient of the aortic valve is 17.0 mmHg.  Mitral Valve: The mitral valve is moderately thickened. There is moderate mitral annular calcification. There is mild to moderate mitral valve regurgitation.  Tricuspid Valve: The tricuspid valve is structurally normal. There is mild tricuspid regurgitation. The Doppler estimated RVSP  is within normal limits at 29.6 mmHg.  Pulmonic Valve: The pulmonic valve is not well visualized. The pulmonic valve regurgitation was not well visualized.  Pericardium: There is no pericardial effusion noted.  Aorta: The aortic root is normal.       CONCLUSIONS:   1. The left ventricular systolic function is moderately decreased, with a visually estimated ejection fraction of 35-40%.   2. There is a moderate posterior wall myocardial infarction.   3. Mild to moderate mitral valve regurgitation.   4. Moderate aortic valve stenosis.   5. Right ventricular systolic pressure is within normal limits.    QUANTITATIVE DATA SUMMARY:     2D MEASUREMENTS:          Normal Ranges:  LVEDV Index:     45 ml/m2       LV SYSTOLIC FUNCTION BY 2D PLANIMETRY (MOD):                       Normal Ranges:  EF-A4C View:    51 % (>=55%)  EF-A2C View:    37 %  EF-Biplane:     46 %  EF-Visual:      38 %  LV EF Reported: 38 %       LV DIASTOLIC FUNCTION:           Normal Ranges:  MV Peak E:             0.38 m/s  (0.7-1.2 m/s)  MV Peak A:             0.92 m/s  (0.42-0.7 m/s)  E/A Ratio:             0.42      (1.0-2.2)  MV e'                  0.103 m/s (>8.0)  MV lateral e'          0.13 m/s  MV medial e'           0.07 m/s  E/e' Ratio:            3.72      (<8.0)       MITRAL VALVE:          Normal Ranges:  MV Vmax:      0.90 m/s (<=1.3m/s)  MV peak PG:   3.2 mmHg (<5mmHg)  MV mean P.0 mmHg (<2mmHg)  MV DT:        180 msec (150-240msec)       AORTIC VALVE:                      Normal Ranges:  AoV Vmax:                2.75 m/s  (<=1.7m/s)  AoV Peak P.2 mmHg (<20mmHg)  AoV Mean P.0 mmHg (1.7-11.5mmHg)  LVOT Max Cerdic:            0.85 m/s  (<=1.1m/s)  AoV VTI:                 54.30 cm  (18-25cm)  LVOT VTI:                12.70 cm  LVOT Diameter:           2.10 cm   (1.8-2.4cm)  AoV Area, VTI:           0.81 cm2  (2.5-5.5cm2)  AoV Area,Vmax:           1.07 cm2  (2.5-4.5cm2)  AoV Dimensionless Index: 0.23        RIGHT VENTRICLE:  TAPSE: 27.7 mm  RV s'  0.11 m/s       TRICUSPID VALVE/RVSP:          Normal Ranges:  Peak TR Velocity:     2.58 m/s  RV Syst Pressure:     30 mmHg  (< 30mmHg)       [id number] Paolo Valles MD  Electronically signed on 10/16/2024 at 12:40:32 PM       ** Final **  XR chest 1 view  Narrative: Interpreted By:  Rangel Florian,   STUDY:  XR CHEST 1 VIEW; 10/16/2024 6:29 am      INDICATION:  Signs/Symptoms:PNA.      COMPARISON:  None      ACCESSION NUMBER(S):  [id number]      ORDERING CLINICIAN:  ADIA HERNANDEZ      TECHNIQUE:  1 view of the chest was performed.      FINDINGS:  Worsening right lung multifocal airspace opacities. Trace left basal  atelectasis. Unchanged prominent left-sided interstitial markings. No  pleural effusion. No pneumothorax.  The cardiomediastinal silhouette  is within normal limits.      Impression: 1. Worsening right lung multifocal airspace opacities.      Critical Finding:  See findings. Notification was initiated on  10/16/2024 at 9:37 am by  Rangel Florian.  (**-OCF-**) Instructions:          Signed by: Rangel Florian 10/16/2024 9:39 AM  Dictation workstation:   [id number]  US renal complete  Narrative: Interpreted By:  June Almeida,   STUDY:  US RENAL COMPLETE; 10/15/2024 5:58 pm      INDICATION:  Signs/Symptoms:Possible hydronephrosis on CT imaging.      COMPARISON:  None.      ACCESSION NUMBER(S):  [id number]      ORDERING CLINICIAN:  ADIA HERNANDEZ      TECHNIQUE:  Grayscale and color Doppler imaging of the kidneys and urinary  bladder.      FINDINGS:  The right kidney measures 11.6 cm. There is marked right  pelvocaliectasis and ureterectasis.          The left kidney measures 10.7 cm. There is marked left  pelvocaliectasis and ureterectasis.      There is no shadowing calculus or solid mass noted. There are areas  of renal cortical thinning bilaterally probably due to scarring..      Limited bladder evaluation: Urinary bladder is markedly distended  with a urinary bladder  diverticulum noted. Small amount of debris  seen in the urinary bladder.      Impression: 1. Markedly distended urinary bladder with marked bilateral  pelvocaliectasis and ureterectasis. Consider bladder outlet  obstruction. Dan catheter placement and decompression of urinary  bladder with repeat renal imaging may be helpful.  2. Urinary bladder debris noted with a small urinary bladder  diverticulum      .      MACRO:  Critical Finding:  See findings. Notification was initiated on  10/15/2024 at 11:59 pm by  June Almeida.  (**-OCF-**) Instructions:      Signed by: June Almeida 10/15/2024 11:59 PM  Dictation workstation:   [id number]      Physical Exam  Constitutional:       Appearance: He is ill-appearing. He is not toxic-appearing.   HENT:      Head: Normocephalic and atraumatic.      Nose: Nose normal.      Mouth/Throat:      Mouth: Mucous membranes are dry.      Pharynx: Oropharynx is clear.   Eyes:      Extraocular Movements: Extraocular movements intact.      Conjunctiva/sclera: Conjunctivae normal.      Pupils: Pupils are equal, round, and reactive to light.   Cardiovascular:      Rate and Rhythm: Regular rhythm. Tachycardia present.      Pulses: Normal pulses.   Pulmonary:      Effort: Respiratory distress present.      Breath sounds: Rales (Coarse rales in all lung fields) present.   Abdominal:      General: Bowel sounds are normal.      Tenderness: There is no abdominal tenderness.      Comments: Breathing with use of abdominal muscles   Musculoskeletal:         General: No tenderness. Normal range of motion.      Cervical back: Normal range of motion.      Right lower leg: Edema (2+ pitting edema) present.      Left lower leg: Edema (2+ pitting edema) present.   Skin:     General: Skin is warm and dry.      Findings: No erythema.   Neurological:      General: No focal deficit present.      Mental Status: He is alert. Mental status is at baseline.   Psychiatric:         Mood and Affect: Mood normal.          Behavior: Behavior normal.      Relevant Results               Assessment/Plan   This patient currently has cardiac telemetry ordered; if you would like to modify or discontinue the telemetry order, click here to go to the orders activity to modify/discontinue the order.              Assessment & Plan  Pneumonia of both lower lobes due to infectious organism  Sepsis (, RR 22)  Multifocal pneumonia, right greater than left  Small bilateral pleural effusions on CT imaging  Pulmonary edema  Shortness of breath  Lactic acidosis  -Pulmonology consult and recommendations appreciated  -Patient was recently diagnosed with COVID 2 days ago, has been experiencing shortness of breath since then  -Patient on continuous BiPAP in the ED, continue use of BiPAP  -Broad-spectrum antibiotics initiated in the ED, continue  -DuoNebs scheduled, as needed albuterol  -Following blood cultures and respiratory cultures.  Strep pneumo and Legionella antigen ordered and pending  -Defer fluids for now due to rales in bilateral lung fields, pitting edema in BLE, and pulm edema/pleural effusions  -D-dimer added onto labwork due to persisting tachycardia, SOB, tachypnea, and hx of COVID    Elevated high-sensitivity troponins  Elevated BNP, 1839  History of hypertension  Prolonged QTc  -Cardiology was consulted in the ED, recommendations appreciated  -Aspirin 324 mg given one-time dose, 81mg daily starting tomorrow  -EKG, per ED physician, sinus tachycardia at 112 bpm.  No STEMI.  QTc 549  -Initial troponin 15,703, delta troponin 17,429. Third ordered  -Echo ordered  -Will hold Seroquel in the setting of prolonged QTc    DILMA on CKD III  Abnormal CT imaging of kidneys  -Cystic areas of the kidneys limited by the field of view.  Hydronephrosis can not be excluded. Ultrasound correlation recommended.  -Renal US and CT A/P added on  -Cr 2.46 and BUN 37 today ( 2.17 and 28 on 9/17)  -Avoid nephrotoxic medications  -Monitor with BMP in the  AM    Lesion of liver on CT imaging  -Per radiologist, cyst vs. hemangioma. Further follow up per attending    Dementia  Anxiety  -Continue home medications as appropriate    DVT Prophylaxis  -Heparin  -SCDs    10/17: Patient showing some improvement. Continue to wean O2. Continue current treatment, anticipate patient will continue to improve acutely. Nevertheless, hospice would still be appropriate at discharge given his overall medical condition.              Luc Sommers, DO

## 2024-10-17 NOTE — PROGRESS NOTES
Physical Therapy                 Therapy Communication Note    Patient Name: Rasheed Antonio  MRN: [id number]  Department: [facility] 8  Room: [facility]  Today's Date: 10/17/2024     Discipline: Physical Therapy    Missed Visit Reason:  (Patient on hold:  awaiting decision regarding hospice and cardiology consult.)    Missed Time: Attempt

## 2024-10-17 NOTE — CARE PLAN
The patient's goals for the shift include  rest    The clinical goals for the shift include decrease  oxygen demand    Problem: Skin  Goal: Prevent/minimize sheer/friction injuries  Outcome: Progressing  Flowsheets (Taken 10/17/2024 1728)  Prevent/minimize sheer/friction injuries:   HOB 30 degrees or less   Turn/reposition every 2 hours/use positioning/transfer devices

## 2024-10-17 NOTE — PROGRESS NOTES
10/17/24 9941   Discharge Planning   Assistance Needed Sw spoke with Pt's POA via phone and she is waiting for a return call back from the following MD prior to deciding on Hospice.  Sw to follow up with Shaniqua once the MD has spoken with her and will make the hospice referral if needed to [facility].

## 2024-10-18 PROCEDURE — 2500000004 HC RX 250 GENERAL PHARMACY W/ HCPCS (ALT 636 FOR OP/ED)

## 2024-10-18 PROCEDURE — 2500000005 HC RX 250 GENERAL PHARMACY W/O HCPCS: Performed by: EMERGENCY MEDICINE

## 2024-10-18 PROCEDURE — 2500000001 HC RX 250 WO HCPCS SELF ADMINISTERED DRUGS (ALT 637 FOR MEDICARE OP)

## 2024-10-18 PROCEDURE — 2060000001 HC INTERMEDIATE ICU ROOM DAILY

## 2024-10-18 PROCEDURE — 99239 HOSP IP/OBS DSCHRG MGMT >30: CPT | Performed by: INTERNAL MEDICINE

## 2024-10-18 RX ORDER — DEXAMETHASONE 6 MG/1
6 TABLET ORAL DAILY
Qty: 7 TABLET | Refills: 0 | Status: SHIPPED | OUTPATIENT
Start: 2024-10-18 | End: 2024-10-25

## 2024-10-18 RX ORDER — ALBUTEROL SULFATE 0.83 MG/ML
3 SOLUTION RESPIRATORY (INHALATION) EVERY 4 HOURS PRN
Qty: 75 ML | Refills: 11 | Status: SHIPPED | OUTPATIENT
Start: 2024-10-18

## 2024-10-18 RX ORDER — LEVOFLOXACIN 500 MG/1
500 TABLET, FILM COATED ORAL DAILY
Qty: 7 TABLET | Refills: 0 | Status: SHIPPED | OUTPATIENT
Start: 2024-10-18 | End: 2024-10-25

## 2024-10-18 RX ORDER — GUAIFENESIN 1200 MG/1
1200 TABLET, EXTENDED RELEASE ORAL 2 TIMES DAILY
Qty: 14 TABLET | Refills: 0 | Status: SHIPPED | OUTPATIENT
Start: 2024-10-18 | End: 2024-10-25

## 2024-10-18 ASSESSMENT — COGNITIVE AND FUNCTIONAL STATUS - GENERAL
TURNING FROM BACK TO SIDE WHILE IN FLAT BAD: A LITTLE
WALKING IN HOSPITAL ROOM: A LOT
CLIMB 3 TO 5 STEPS WITH RAILING: A LOT
EATING MEALS: A LITTLE
STANDING UP FROM CHAIR USING ARMS: A LOT
TOILETING: A LOT
PERSONAL GROOMING: A LOT
MOVING TO AND FROM BED TO CHAIR: A LOT
DRESSING REGULAR UPPER BODY CLOTHING: A LITTLE
DRESSING REGULAR LOWER BODY CLOTHING: A LOT
MOVING FROM LYING ON BACK TO SITTING ON SIDE OF FLAT BED WITH BEDRAILS: A LITTLE
DAILY ACTIVITIY SCORE: 14
MOBILITY SCORE: 14
HELP NEEDED FOR BATHING: A LOT

## 2024-10-18 ASSESSMENT — PAIN - FUNCTIONAL ASSESSMENT
PAIN_FUNCTIONAL_ASSESSMENT: 0-10
PAIN_FUNCTIONAL_ASSESSMENT: 0-10

## 2024-10-18 ASSESSMENT — PAIN SCALES - GENERAL
PAINLEVEL_OUTOF10: 0 - NO PAIN
PAINLEVEL_OUTOF10: 0 - NO PAIN

## 2024-10-18 NOTE — PROGRESS NOTES
Review of EMR for NPO x3 days.  Pt is comfort measures only with possible transition to hospice. Aggressive nutrition interventions not currently indicated. This service remains available as needed. Please reconsult if GOC change.

## 2024-10-18 NOTE — PROGRESS NOTES
Occupational Therapy                 Therapy Communication Note    Patient Name: Rasheed Antonio  MRN: [id number]  Department: [facility] 8  Room: 820/820-  Today's Date: 10/18/2024     Jl Lucero OTR/L supervised this cancel note.     Discipline: Occupational Therapy    Missed Visit Reason:  (Per TCC note, patient discharging back to facility for hospice services. Plan:  Discharge patient from OT.)    Missed Time: Attempt

## 2024-10-18 NOTE — PROGRESS NOTES
Physical Therapy    Discharge Summary    Name: Rasheed Antonio  MRN: [id number]  : 1940  Date: 10/18/24    Discharge Summary: PT    Discharge Reason: Per TCC note, pt is discharging back to facility for hospice services. Pt has been unable to participate in PT and will be discharged from caseload.

## 2024-10-18 NOTE — CARE PLAN
The patient's goals for the shift include eating    The clinical goals for the shift include safety and comfort    Problem: Skin  Goal: Prevent/minimize sheer/friction injuries  Outcome: Progressing  Flowsheets (Taken 10/18/2024 1337)  Prevent/minimize sheer/friction injuries:   HOB 30 degrees or less   Turn/reposition every 2 hours/use positioning/transfer devices

## 2024-10-18 NOTE — PROGRESS NOTES
10/18/24 1043   Discharge Planning   Home or Post Acute Services Post acute facilities (Rehab/SNF/etc)   Type of Post Acute Facility Services Assisted living   Expected Discharge Disposition Home     Received update from Dr. Sommers on plan for discharge today back to [facility] and family to meet with hospice next week once back at facility. Spoke with Marina at [facility] and confirmed that patient can return today. Patient does not have oxygen at [facility]. Simi with [facility] updated on need for home oxygen. Called and spoke with patients daughter Shaniqua, confirmed plan to return to [facility] at discharge. Nursing to arrange transport at discharge. Bedside nurse provided with report number.

## 2024-10-18 NOTE — DISCHARGE SUMMARY
Discharge Diagnosis  Pneumonia of both lower lobes due to infectious organism    Issues Requiring Follow-Up  Hospice    Discharge Meds     Medication List      START taking these medications     albuterol 2.5 mg /3 mL (0.083 %) nebulizer solution; Take 3 mL by   nebulization every 4 hours if needed for wheezing.   dexAMETHasone 6 mg tablet; Commonly known as: Decadron; Take 1 tablet (6   mg) by mouth once daily for 7 days.   levoFLOXacin 500 mg tablet; Commonly known as: Levaquin; Take 1 tablet   (500 mg) by mouth once daily for 7 days.   oxygen gas therapy; Commonly known as: O2; Inhale 1 each once every 24   hours.     CHANGE how you take these medications     guaiFENesin 1,200 mg tablet extended release 12hr; Commonly known as:   Mucinex; Take 1 tablet (1,200 mg) by mouth 2 times a day for 7 days. Do   not crush, chew, or split.; What changed: medication strength     CONTINUE taking these medications     acetaminophen 325 mg tablet; Commonly known as: Tylenol   amLODIPine 10 mg tablet; Commonly known as: Norvasc; Take 1 tablet (10   mg) by mouth once daily.   dorzolamide 2 % ophthalmic solution; Commonly known as: Trusopt   latanoprost 0.005 % ophthalmic solution; Commonly known as: Xalatan   pantoprazole 40 mg EC tablet; Commonly known as: ProtoNix; Take 1 tablet   (40 mg) by mouth 2 times a day. Do not crush, chew, or split.   QUEtiapine 25 mg tablet; Commonly known as: SEROquel     STOP taking these medications     cholecalciferol 50,000 unit capsule; Commonly known as: Vitamin D-3   doxycycline 100 mg tablet; Commonly known as: Vibra-Tabs       Test Results Pending At Discharge  Pending Labs       Order Current Status    Blood Culture Preliminary result    Blood Culture Preliminary result            Hospital Course   Sepsis (, RR 22)  Multifocal pneumonia, right greater than left  Small bilateral pleural effusions on CT imaging  Pulmonary edema  Shortness of breath  Lactic acidosis  -Pulmonology consult  and recommendations appreciated  -Patient was recently diagnosed with COVID 2 days ago, has been experiencing shortness of breath since then  -Patient on continuous BiPAP in the ED, continue use of BiPAP  -Broad-spectrum antibiotics initiated in the ED, continue  -DuoNebs scheduled, as needed albuterol  -Following blood cultures and respiratory cultures.  Strep pneumo and Legionella antigen ordered and pending  -Defer fluids for now due to rales in bilateral lung fields, pitting edema in BLE, and pulm edema/pleural effusions  -D-dimer added onto labwork due to persisting tachycardia, SOB, tachypnea, and hx of COVID    Elevated high-sensitivity troponins  Elevated BNP, 1839  History of hypertension  Prolonged QTc  -Cardiology was consulted in the ED, recommendations appreciated  -Aspirin 324 mg given one-time dose, 81mg daily starting tomorrow  -EKG, per ED physician, sinus tachycardia at 112 bpm.  No STEMI.  QTc 549  -Initial troponin 15,703, delta troponin 17,429. Third ordered  -Echo ordered  -Will hold Seroquel in the setting of prolonged QTc     DILMA on CKD III  Abnormal CT imaging of kidneys  -Cystic areas of the kidneys limited by the field of view.  Hydronephrosis can not be excluded. Ultrasound correlation recommended.  -Renal US and CT A/P added on  -Cr 2.46 and BUN 37 today ( 2.17 and 28 on 9/17)  -Avoid nephrotoxic medications  -Monitor with BMP in the AM     Lesion of liver on CT imaging  -Per radiologist, cyst vs. hemangioma. Further follow up per attending    Dementia  Anxiety  -Continue home medications as appropriate    DVT Prophylaxis  -Heparin  -SCDs     10/17: Patient showing some improvement. Continue to wean O2. Continue current treatment, anticipate patient will continue to improve acutely. Nevertheless, hospice would still be appropriate at discharge given his overall medical condition.     10/18: Goals of Care discussed with daughter (POA) yesterday. She is open to hospice care. Discussed that  patient is not imminently dying and may recover from present acute illness. Nevertheless, given patient's overall health and now recurrent hospitalizations, patient would still be hospice appropriate. Daughter agreeable to complete antibiotics by mouth for present illness but annemarie not want patient to go back to the hospital under any circumstances going forward. Daughter will sign with hospice through [facility] next week. Patient weaned to 3L NC and discharged back to [facility].     Pertinent Physical Exam At Time of Discharge  Physical Exam  Constitutional:       Appearance: He is ill-appearing. He is not toxic-appearing.   HENT:      Head: Normocephalic and atraumatic.      Nose: Nose normal.      Mouth/Throat:      Mouth: Mucous membranes are dry.      Pharynx: Oropharynx is clear.   Eyes:      Extraocular Movements: Extraocular movements intact.      Conjunctiva/sclera: Conjunctivae normal.      Pupils: Pupils are equal, round, and reactive to light.   Cardiovascular:      Rate and Rhythm: Regular rhythm. Tachycardia present.      Pulses: Normal pulses.   Pulmonary:      Effort: Respiratory distress present.      Breath sounds: Rales (Coarse rales in all lung fields) present.   Abdominal:      General: Bowel sounds are normal.      Tenderness: There is no abdominal tenderness.      Comments: Breathing with use of abdominal muscles   Musculoskeletal:         General: No tenderness. Normal range of motion.      Cervical back: Normal range of motion.      Right lower leg: Edema (2+ pitting edema) present.      Left lower leg: Edema (2+ pitting edema) present.   Skin:     General: Skin is warm and dry.      Findings: No erythema.   Neurological:      General: No focal deficit present.      Mental Status: He is alert. Mental status is at baseline.   Psychiatric:         Mood and Affect: Mood normal.         Behavior: Behavior normal.      Outpatient Follow-Up  Future Appointments   Date Time Provider Department Center   11/26/2024  10:00 AM MD [facility] [place]         Luc Sommers, DO

## 2024-10-18 NOTE — PROGRESS NOTES
1325: report called to Ting at [facility] ([phone])  Phone call returned to daughter, Shaniqua.

## 2024-10-18 NOTE — PROGRESS NOTES
Subjective   Patient denies discomfort. Breathing comfortably on nasal cannula. Not in obvious pain.       Objective     Last Recorded Vitals  /78   Pulse 73   Temp 35.7 °C (96.3 °F)   Resp 19   Wt 59 kg (130 lb)   SpO2 95%   Intake/Output last 3 Shifts:    Intake/Output Summary (Last 24 hours) at 10/18/2024 1615  Last data filed at 10/18/2024 1415  Gross per 24 hour   Intake --   Output 1400 ml   Net -1400 ml       Admission Weight  Weight: 63.5 kg (140 lb) (10/15/24 1100)    Daily Weight  10/15/24 : 59 kg (130 lb)    Physical Exam  Constitutional:       Appearance: Normal appearance.   HENT:      Head: Normocephalic and atraumatic.      Mouth/Throat:      Mouth: Mucous membranes are moist.   Eyes:      Extraocular Movements: Extraocular movements intact.   Cardiovascular:      Rate and Rhythm: Normal rate and regular rhythm.   Pulmonary:      Effort: Pulmonary effort is normal.      Breath sounds: Normal breath sounds.   Abdominal:      General: Abdomen is flat. There is no distension.      Palpations: Abdomen is soft.   Musculoskeletal:      Right lower leg: No edema.      Left lower leg: No edema.   Skin:     General: Skin is warm and dry.   Neurological:      General: No focal deficit present.      Mental Status: He is alert.   Psychiatric:         Mood and Affect: Mood normal.           Assessment/Plan     Pulmonary edema, suspected secondary to NSTEMI  COVID pneumonia  Pneumonia, hospital acquired, possible  Acute hypoxic respiratory failure  Acute systolic heart failure, EF 35-40%  Ischemic cardiomyopathy, suspected  Dementia  GERD  HTN     Patient pursuing hospice, appropriate given recent cardiac event  Continue dexamethasone inpatient to ease inflammation   Continue azithromycin while inpatient  Continue vancomycin and cefepime inpatient  Oxygen for saturation of 89 to 94%  Incentive spirometry  Bronchodilators therapy as needed  DVT prophylaxis  Head evaluation and aspiration precautions.      This is a preliminary note, please await attending attestation for a finalized plan.     Dr. Jw Nam  Internal Medicine PGY-3  Please message me with any questions

## 2024-10-19 VITALS
OXYGEN SATURATION: 96 % | SYSTOLIC BLOOD PRESSURE: 150 MMHG | HEART RATE: 70 BPM | TEMPERATURE: 96.4 F | WEIGHT: 130 LBS | RESPIRATION RATE: 22 BRPM | DIASTOLIC BLOOD PRESSURE: 82 MMHG | BODY MASS INDEX: 22.31 KG/M2

## 2024-10-19 LAB
BACTERIA BLD CULT: NORMAL
BACTERIA BLD CULT: NORMAL

## 2024-10-19 PROCEDURE — 94640 AIRWAY INHALATION TREATMENT: CPT

## 2024-10-19 PROCEDURE — 2500000001 HC RX 250 WO HCPCS SELF ADMINISTERED DRUGS (ALT 637 FOR MEDICARE OP)

## 2024-10-19 PROCEDURE — 2500000005 HC RX 250 GENERAL PHARMACY W/O HCPCS: Performed by: EMERGENCY MEDICINE

## 2024-10-19 PROCEDURE — 2500000002 HC RX 250 W HCPCS SELF ADMINISTERED DRUGS (ALT 637 FOR MEDICARE OP, ALT 636 FOR OP/ED)

## 2024-10-19 PROCEDURE — 2500000004 HC RX 250 GENERAL PHARMACY W/ HCPCS (ALT 636 FOR OP/ED)

## 2024-10-19 PROCEDURE — 99232 SBSQ HOSP IP/OBS MODERATE 35: CPT | Performed by: INTERNAL MEDICINE

## 2024-10-19 ASSESSMENT — PAIN SCALES - GENERAL: PAINLEVEL_OUTOF10: 0 - NO PAIN

## 2024-10-19 NOTE — CARE PLAN
The patient's goals for the shift include discharge.    The clinical goals for the shift include discharge

## 2024-10-21 NOTE — PROGRESS NOTES
Ilan Bundy is a 84 y.o. male on day 4 of admission presenting with Pneumonia of both lower lobes due to infectious organism.      Subjective   No events overnight. Patient seen and examined at bedside. No complaints. Will be leaving today.       Objective     Last Recorded Vitals  /82 (BP Location: Right arm, Patient Position: Lying)   Pulse 70   Temp 35.8 °C (96.4 °F) (Temporal)   Resp 22   Wt 59 kg (130 lb)   SpO2 96%   Intake/Output last 3 Shifts:  No intake or output data in the 24 hours ending 10/20/24 2000      Admission Weight  Weight: 63.5 kg (140 lb) (10/15/24 1100)    Daily Weight  10/15/24 : 59 kg (130 lb)    Image Results  ECG 12 lead  Fast sinus arrhythmia  Repol abnrm, severe global ischemia (LM/MVD)  Prolonged QT interval    See ED provider note for full interpretation and clinical correlation  Confirmed by Jenny Shields ([id number]) on 10/16/2024 4:30:14 PM  Transthoracic Echo (TTE) [place] [address]            Tel [phone] and Fax [phone]    TRANSTHORACIC ECHOCARDIOGRAM REPORT       Patient Name:      ILAN BUNDY       Reading Physician:    [id number] Paolo Valles MD  Study Date:        10/16/2024           Ordering Provider:    [id number] DAIA HERNANDEZ  MRN/PID:           [id number]             Fellow:  Accession#:        [id number]         Nurse:  Date of Birth/Age: 1940 / 84 years Sonographer:          Deysi Aguilar RDCS  Gender:            M                    Additional Staff:  Height:            162.56 cm            Admit Date:           10/15/2024  Weight:            58.97 kg             Admission Status:     Inpatient -                                                                Routine  BSA / BMI:         1.63 m2 / 22.31      Encounter#:           [id number]                      kg/m2  Blood Pressure:    118/65 mmHg          Department Location:  [facility] 8th floor                                                                ICU Stepdown    Study Type:    TRANSTHORACIC ECHO (TTE) COMPLETE  Diagnosis/ICD: Shortness of breath-R06.02; Elevated Troponin-R79.89  Indication:    Elevated BNP COVID Positive  CPT Code:      Echo Limited-12971; Doppler Limited-00493; Color Doppler-76427    Patient History:  Pertinent History: HTN. CKD III, Dementia.    Study Detail: The following Echo studies were performed: 2D, Doppler and color                flow. Technically challenging study due to patient lying in supine                position and the patient's lack of cooperation.       PHYSICIAN INTERPRETATION:  Left Ventricle: The left ventricular systolic function is moderately decreased, with a visually estimated ejection fraction of 35-40%. Left venticular wall motion is abnormal. The left ventricular cavity size is normal. Left ventricular diastolic filling was indeterminate. There is evidence of a moderate posterior wall myocardial infarction.  Left Atrium: The left atrium is mildly dilated.  Right Ventricle: The right ventricle is normal in size. There is normal right ventricular global systolic function.  Right Atrium: The right atrium is mildly dilated.  Aortic Valve: The aortic valve is trileaflet. There is evidence of moderate aortic valve stenosis. The aortic valve dimensionless index is 0.23. There is trace aortic valve regurgitation. The peak instantaneous gradient of the aortic valve is 30.2 mmHg. The mean gradient of the aortic valve is 17.0 mmHg.  Mitral Valve: The mitral valve is moderately thickened. There is moderate mitral annular calcification. There is mild to moderate mitral valve regurgitation.  Tricuspid Valve: The tricuspid valve is structurally normal. There is mild tricuspid regurgitation. The Doppler estimated RVSP is within normal limits at 29.6 mmHg.  Pulmonic Valve: The  pulmonic valve is not well visualized. The pulmonic valve regurgitation was not well visualized.  Pericardium: There is no pericardial effusion noted.  Aorta: The aortic root is normal.       CONCLUSIONS:   1. The left ventricular systolic function is moderately decreased, with a visually estimated ejection fraction of 35-40%.   2. There is a moderate posterior wall myocardial infarction.   3. Mild to moderate mitral valve regurgitation.   4. Moderate aortic valve stenosis.   5. Right ventricular systolic pressure is within normal limits.    QUANTITATIVE DATA SUMMARY:     2D MEASUREMENTS:          Normal Ranges:  LVEDV Index:     45 ml/m2       LV SYSTOLIC FUNCTION BY 2D PLANIMETRY (MOD):                       Normal Ranges:  EF-A4C View:    51 % (>=55%)  EF-A2C View:    37 %  EF-Biplane:     46 %  EF-Visual:      38 %  LV EF Reported: 38 %       LV DIASTOLIC FUNCTION:           Normal Ranges:  MV Peak E:             0.38 m/s  (0.7-1.2 m/s)  MV Peak A:             0.92 m/s  (0.42-0.7 m/s)  E/A Ratio:             0.42      (1.0-2.2)  MV e'                  0.103 m/s (>8.0)  MV lateral e'          0.13 m/s  MV medial e'           0.07 m/s  E/e' Ratio:            3.72      (<8.0)       MITRAL VALVE:          Normal Ranges:  MV Vmax:      0.90 m/s (<=1.3m/s)  MV peak PG:   3.2 mmHg (<5mmHg)  MV mean P.0 mmHg (<2mmHg)  MV DT:        180 msec (150-240msec)       AORTIC VALVE:                      Normal Ranges:  AoV Vmax:                2.75 m/s  (<=1.7m/s)  AoV Peak P.2 mmHg (<20mmHg)  AoV Mean P.0 mmHg (1.7-11.5mmHg)  LVOT Max Cedric:            0.85 m/s  (<=1.1m/s)  AoV VTI:                 54.30 cm  (18-25cm)  LVOT VTI:                12.70 cm  LVOT Diameter:           2.10 cm   (1.8-2.4cm)  AoV Area, VTI:           0.81 cm2  (2.5-5.5cm2)  AoV Area,Vmax:           1.07 cm2  (2.5-4.5cm2)  AoV Dimensionless Index: 0.23       RIGHT VENTRICLE:  TAPSE: 27.7 mm  RV s'  0.11 m/s        TRICUSPID VALVE/RVSP:          Normal Ranges:  Peak TR Velocity:     2.58 m/s  RV Syst Pressure:     30 mmHg  (< 30mmHg)       [id number] Paolo Valles MD  Electronically signed on 10/16/2024 at 12:40:32 PM       ** Final **  XR chest 1 view  Narrative: Interpreted By:  Rangel Florian,   STUDY:  XR CHEST 1 VIEW; 10/16/2024 6:29 am      INDICATION:  Signs/Symptoms:PNA.      COMPARISON:  None      ACCESSION NUMBER(S):  [id number]      ORDERING CLINICIAN:  ADIA HERNANDEZ      TECHNIQUE:  1 view of the chest was performed.      FINDINGS:  Worsening right lung multifocal airspace opacities. Trace left basal  atelectasis. Unchanged prominent left-sided interstitial markings. No  pleural effusion. No pneumothorax.  The cardiomediastinal silhouette  is within normal limits.      Impression: 1. Worsening right lung multifocal airspace opacities.      Critical Finding:  See findings. Notification was initiated on  10/16/2024 at 9:37 am by  Rangel Florian.  (**-OCF-**) Instructions:          Signed by: Rangel Florian 10/16/2024 9:39 AM  Dictation workstation:   [id number]  US renal complete  Narrative: Interpreted By:  June Almeida,   STUDY:  US RENAL COMPLETE; 10/15/2024 5:58 pm      INDICATION:  Signs/Symptoms:Possible hydronephrosis on CT imaging.      COMPARISON:  None.      ACCESSION NUMBER(S):  [id number]      ORDERING CLINICIAN:  ADIA HERNANDEZ      TECHNIQUE:  Grayscale and color Doppler imaging of the kidneys and urinary  bladder.      FINDINGS:  The right kidney measures 11.6 cm. There is marked right  pelvocaliectasis and ureterectasis.          The left kidney measures 10.7 cm. There is marked left  pelvocaliectasis and ureterectasis.      There is no shadowing calculus or solid mass noted. There are areas  of renal cortical thinning bilaterally probably due to scarring..      Limited bladder evaluation: Urinary bladder is markedly distended  with a urinary bladder diverticulum noted. Small amount of debris  seen in the  urinary bladder.      Impression: 1. Markedly distended urinary bladder with marked bilateral  pelvocaliectasis and ureterectasis. Consider bladder outlet  obstruction. Dan catheter placement and decompression of urinary  bladder with repeat renal imaging may be helpful.  2. Urinary bladder debris noted with a small urinary bladder  diverticulum      .      MACRO:  Critical Finding:  See findings. Notification was initiated on  10/15/2024 at 11:59 pm by  June Almeida.  (**-OCF-**) Instructions:      Signed by: June Almeida 10/15/2024 11:59 PM  Dictation workstation:   [id number]      Physical Exam  Constitutional:       Appearance: He is ill-appearing. He is not toxic-appearing.   HENT:      Head: Normocephalic and atraumatic.      Nose: Nose normal.      Mouth/Throat:      Mouth: Mucous membranes are dry.      Pharynx: Oropharynx is clear.   Eyes:      Extraocular Movements: Extraocular movements intact.      Conjunctiva/sclera: Conjunctivae normal.      Pupils: Pupils are equal, round, and reactive to light.   Cardiovascular:      Rate and Rhythm: Regular rhythm. Tachycardia present.      Pulses: Normal pulses.   Pulmonary:      Effort: Respiratory distress present.      Breath sounds: Rales (Coarse rales in all lung fields) present.   Abdominal:      General: Bowel sounds are normal.      Tenderness: There is no abdominal tenderness.      Comments: Breathing with use of abdominal muscles   Musculoskeletal:         General: No tenderness. Normal range of motion.      Cervical back: Normal range of motion.      Right lower leg: Edema (2+ pitting edema) present.      Left lower leg: Edema (2+ pitting edema) present.   Skin:     General: Skin is warm and dry.      Findings: No erythema.   Neurological:      General: No focal deficit present.      Mental Status: He is alert. Mental status is at baseline.   Psychiatric:         Mood and Affect: Mood normal.         Behavior: Behavior normal.      Relevant  Results               Assessment/Plan                  Assessment & Plan  Pneumonia of both lower lobes due to infectious organism  Sepsis (, RR 22)  Multifocal pneumonia, right greater than left  Small bilateral pleural effusions on CT imaging  Pulmonary edema  Shortness of breath  Lactic acidosis  -Pulmonology consult and recommendations appreciated  -Patient was recently diagnosed with COVID 2 days ago, has been experiencing shortness of breath since then  -Patient on continuous BiPAP in the ED, continue use of BiPAP  -Broad-spectrum antibiotics initiated in the ED, continue  -DuoNebs scheduled, as needed albuterol  -Following blood cultures and respiratory cultures.  Strep pneumo and Legionella antigen ordered and pending  -Defer fluids for now due to rales in bilateral lung fields, pitting edema in BLE, and pulm edema/pleural effusions  -D-dimer added onto labwork due to persisting tachycardia, SOB, tachypnea, and hx of COVID    Elevated high-sensitivity troponins  Elevated BNP, 1839  History of hypertension  Prolonged QTc  -Cardiology was consulted in the ED, recommendations appreciated  -Aspirin 324 mg given one-time dose, 81mg daily starting tomorrow  -EKG, per ED physician, sinus tachycardia at 112 bpm.  No STEMI.  QTc 549  -Initial troponin 15,703, delta troponin 17,429. Third ordered  -Echo ordered  -Will hold Seroquel in the setting of prolonged QTc    DILMA on CKD III  Abnormal CT imaging of kidneys  -Cystic areas of the kidneys limited by the field of view.  Hydronephrosis can not be excluded. Ultrasound correlation recommended.  -Renal US and CT A/P added on  -Cr 2.46 and BUN 37 today ( 2.17 and 28 on 9/17)  -Avoid nephrotoxic medications  -Monitor with BMP in the AM    Lesion of liver on CT imaging  -Per radiologist, cyst vs. hemangioma. Further follow up per attending    Dementia  Anxiety  -Continue home medications as appropriate    DVT Prophylaxis  -Heparin  -SCDs    10/17: Patient showing  some improvement. Continue to wean O2. Continue current treatment, anticipate patient will continue to improve acutely. Nevertheless, hospice would still be appropriate at discharge given his overall medical condition.    10/19: Patient discharged. Awaiting transportation back to [place]. Will enroll in hospice next week.              Luc Sommers, DO

## 2024-10-22 NOTE — ED PROCEDURE NOTE
Procedure  Critical Care    Performed by: Tom Ceballos MD  Authorized by: Tom Ceballos MD    Critical care provider statement:     Critical care time (minutes):  35    Critical care time was exclusive of:  Separately billable procedures and treating other patients    Critical care was necessary to treat or prevent imminent or life-threatening deterioration of the following conditions:  Respiratory failure    Critical care was time spent personally by me on the following activities:  Blood draw for specimens, discussions with consultants, discussions with primary provider, evaluation of patient's response to treatment, examination of patient, ordering and performing treatments and interventions, ordering and review of laboratory studies, ordering and review of radiographic studies, pulse oximetry and re-evaluation of patient's condition    Care discussed with: admitting provider                 Tom Ceballos MD  10/21/24 [id number]

## 2024-11-19 ENCOUNTER — APPOINTMENT (OUTPATIENT)
Dept: GASTROENTEROLOGY | Facility: HOSPITAL | Age: 84
End: 2024-11-19
Payer: MEDICARE

## 2024-11-26 ENCOUNTER — TELEPHONE (OUTPATIENT)
Dept: GASTROENTEROLOGY | Facility: CLINIC | Age: 84
End: 2024-11-26
Payer: MEDICARE

## 2024-11-26 NOTE — TELEPHONE ENCOUNTER
Call came in for the patient to reschedule EGD for him.  is Cici, and she is an employee in a nursing home where the patient is staying. Please review.

## 2024-12-13 ENCOUNTER — ANESTHESIA (OUTPATIENT)
Dept: GASTROENTEROLOGY | Facility: HOSPITAL | Age: 84
End: 2024-12-13
Payer: MEDICARE

## 2024-12-13 ENCOUNTER — ANESTHESIA EVENT (OUTPATIENT)
Dept: GASTROENTEROLOGY | Facility: HOSPITAL | Age: 84
End: 2024-12-13
Payer: MEDICARE

## 2024-12-13 ENCOUNTER — APPOINTMENT (OUTPATIENT)
Dept: GASTROENTEROLOGY | Facility: HOSPITAL | Age: 84
End: 2024-12-13
Payer: MEDICARE

## 2024-12-13 ENCOUNTER — HOSPITAL ENCOUNTER (OUTPATIENT)
Dept: GASTROENTEROLOGY | Facility: HOSPITAL | Age: 84
Discharge: HOME | End: 2024-12-13
Payer: MEDICARE

## 2024-12-13 VITALS
WEIGHT: 130.07 LBS | HEIGHT: 64 IN | TEMPERATURE: 97.5 F | SYSTOLIC BLOOD PRESSURE: 144 MMHG | BODY MASS INDEX: 22.21 KG/M2 | DIASTOLIC BLOOD PRESSURE: 78 MMHG | RESPIRATION RATE: 16 BRPM | HEART RATE: 87 BPM | OXYGEN SATURATION: 99 %

## 2024-12-13 DIAGNOSIS — K21.9 GASTROESOPHAGEAL REFLUX DISEASE WITHOUT ESOPHAGITIS: ICD-10-CM

## 2024-12-13 DIAGNOSIS — K20.90 ESOPHAGITIS: ICD-10-CM

## 2024-12-13 PROCEDURE — 3700000001 HC GENERAL ANESTHESIA TIME - INITIAL BASE CHARGE

## 2024-12-13 PROCEDURE — 7100000010 HC PHASE TWO TIME - EACH INCREMENTAL 1 MINUTE

## 2024-12-13 PROCEDURE — 2500000004 HC RX 250 GENERAL PHARMACY W/ HCPCS (ALT 636 FOR OP/ED): Performed by: ANESTHESIOLOGIST ASSISTANT

## 2024-12-13 PROCEDURE — 3700000002 HC GENERAL ANESTHESIA TIME - EACH INCREMENTAL 1 MINUTE

## 2024-12-13 PROCEDURE — 43239 EGD BIOPSY SINGLE/MULTIPLE: CPT | Performed by: INTERNAL MEDICINE

## 2024-12-13 PROCEDURE — 7100000009 HC PHASE TWO TIME - INITIAL BASE CHARGE

## 2024-12-13 RX ORDER — PANTOPRAZOLE SODIUM 40 MG/1
40 TABLET, DELAYED RELEASE ORAL
Qty: 30 TABLET | Refills: 2 | Status: SHIPPED | OUTPATIENT
Start: 2024-12-13 | End: 2025-03-13

## 2024-12-13 RX ORDER — LIDOCAINE HCL/PF 100 MG/5ML
SYRINGE (ML) INTRAVENOUS AS NEEDED
Status: DISCONTINUED | OUTPATIENT
Start: 2024-12-13 | End: 2024-12-13

## 2024-12-13 RX ORDER — SODIUM CHLORIDE 0.9 % (FLUSH) 0.9 %
SYRINGE (ML) INJECTION AS NEEDED
Status: DISCONTINUED | OUTPATIENT
Start: 2024-12-13 | End: 2024-12-13

## 2024-12-13 RX ORDER — PROPOFOL 10 MG/ML
INJECTION, EMULSION INTRAVENOUS AS NEEDED
Status: DISCONTINUED | OUTPATIENT
Start: 2024-12-13 | End: 2024-12-13

## 2024-12-13 SDOH — HEALTH STABILITY: MENTAL HEALTH: CURRENT SMOKER: 0

## 2024-12-13 ASSESSMENT — PAIN SCALES - GENERAL
PAINLEVEL_OUTOF10: 0 - NO PAIN

## 2024-12-13 ASSESSMENT — COLUMBIA-SUICIDE SEVERITY RATING SCALE - C-SSRS
1. IN THE PAST MONTH, HAVE YOU WISHED YOU WERE DEAD OR WISHED YOU COULD GO TO SLEEP AND NOT WAKE UP?: NO
2. HAVE YOU ACTUALLY HAD ANY THOUGHTS OF KILLING YOURSELF?: NO
6. HAVE YOU EVER DONE ANYTHING, STARTED TO DO ANYTHING, OR PREPARED TO DO ANYTHING TO END YOUR LIFE?: NO

## 2024-12-13 ASSESSMENT — PAIN - FUNCTIONAL ASSESSMENT: PAIN_FUNCTIONAL_ASSESSMENT: 0-10

## 2024-12-13 NOTE — H&P
Outpatient Hospital Procedure    Patient Profile-Procedures  Initial Info  Patient Demographics  Name Rasheed Bundy  Date of Birth 1940  MRN [id number]  Address   [address] [place] [id number] [place] [address]    Primary Phone Number [phone]  Secondary Phone Number    Berny Beck    Procedures   EGD      Indication:  LA grade D esophagitis     Primary contact name and number   Extended Emergency Contact Information  Primary Emergency Contact: KENY BUNDY           [place], [place]  Home Phone: [phone]  Work Phone: [phone]  Mobile Phone: [phone]  Relation: Spouse  Preferred language: English   needed? No  Secondary Emergency Contact: KATHIE BUNDY [place]  Home Phone: [phone]  Work Phone: [phone]  Mobile Phone: [phone]  Relation: Son    General Health  Weight   Vitals:    12/13/24 1251   Weight: 59 kg (130 lb 1.1 oz)     BMI Body mass index is 22.33 kg/m².    Allergies  Allergies   Allergen Reactions    Grass Pollen Itching    Penicillins Rash       Past Medical History   Past Medical History:   Diagnosis Date    Personal history of other diseases of the respiratory system 09/28/2016    History of acute bronchitis    Personal history of other endocrine, nutritional and metabolic disease 01/13/2017    History of hyperlipidemia    Unspecified asthma with (acute) exacerbation ([facility]) 08/15/2018    Asthma exacerbation    Vitamin D deficiency, unspecified 02/11/2022    Vitamin D deficiency       Provider assessment  Diagnosis  Medication Reviewed - yes  Prior to Admission medications    Medication Sig Start Date End Date Taking? Authorizing Provider   acetaminophen (Tylenol) 325 mg tablet Take 2 tablets (650 mg) by mouth every 8 hours if needed for mild pain (1 - 3) or fever (temp greater than 38.0 C).   Yes Historical Provider, MD   albuterol 2.5 mg /3 mL (0.083 %) nebulizer solution Take 3 mL by nebulization every 4 hours if needed for  wheezing. 10/18/24  Yes Luc Sommers DO   dorzolamide (Trusopt) 2 % ophthalmic solution Administer 1 drop into both eyes 3 times a day.   Yes Historical Provider, MD   latanoprost (Xalatan) 0.005 % ophthalmic solution Administer 1 drop into both eyes once daily at bedtime.   Yes Historical Provider, MD   oxygen (O2) gas therapy Inhale 1 each once every 24 hours. 10/18/24  Yes Luc Sommers DO   QUEtiapine (SEROquel) 25 mg tablet Take 1 tablet (25 mg) by mouth 2 times a day.   Yes Historical Provider, MD   amLODIPine (Norvasc) 10 mg tablet Take 1 tablet (10 mg) by mouth once daily. 9/17/24 10/17/24  Ivan James PA-C   dexAMETHasone (Decadron) 6 mg tablet Take 1 tablet (6 mg) by mouth once daily for 7 days. 10/18/24 10/25/24  Luc Sommers DO   pantoprazole (ProtoNix) 40 mg EC tablet Take 1 tablet (40 mg) by mouth 2 times a day. Do not crush, chew, or split. 9/17/24 10/17/24  Ivan James PA-C       This is my H&P    Physical Exam  Physical Exam  Constitutional:       Appearance: Normal appearance.   HENT:      Mouth/Throat:      Mouth: Mucous membranes are moist.   Eyes:      Extraocular Movements: Extraocular movements intact.      Pupils: Pupils are equal, round, and reactive to light.   Cardiovascular:      Rate and Rhythm: Normal rate and regular rhythm.      Pulses: Normal pulses.      Heart sounds: Normal heart sounds.   Pulmonary:      Effort: Pulmonary effort is normal.      Breath sounds: Rales present.   Abdominal:      General: Bowel sounds are normal.      Palpations: Abdomen is soft.   Neurological:      Mental Status: He is alert.         ASA PS Classification 3  Sedation Plan Moderate  Procedure Plan - pre-procedural (re)assesment completed by physician:  discharge/transfer patient when discharge criteria met    Manan Pollock MD  12/13/2024 2:17 PM

## 2024-12-13 NOTE — ANESTHESIA PREPROCEDURE EVALUATION
Patient: Rasheed Antonio    Procedure Information       Date/Time: 12/13/24 1230    Scheduled providers: Manan Pollock MD; Odell Diop MD    Procedure: EGD    Location: [facility]            Relevant Problems   Anesthesia   (-) Difficult intubation   (-) PONV (postoperative nausea and vomiting)      Cardiac   (+) Hypertension, essential      Pulmonary   (+) Pneumonia of both lower lobes due to infectious organism      Neuro   (+) Anxiety   (+) Mild dementia associated with alcoholism, without behavioral disturbance, psychotic disturbance, mood disturbance, or anxiety (Multi)      GI   (+) Gastroesophageal reflux disease without esophagitis      Hematology   (+) Anemia      HEENT   (+) Glaucoma      ID   (+) Pneumonia of both lower lobes due to infectious organism       Clinical information reviewed:   Tobacco  Allergies  Meds   Med Hx  Surg Hx   Fam Hx  Soc Hx        NPO Detail:  NPO/Void Status  Date of Last Liquid: 12/13/24  Time of Last Liquid: 0800  Date of Last Solid: 12/13/24  Time of Last Solid: 0000         Physical Exam    Airway  Mallampati: III  TM distance: >3 FB  Neck ROM: full     Cardiovascular - normal exam  Rhythm: regular  Rate: normal     Dental    Pulmonary - normal exam     Abdominal            Anesthesia Plan    History of general anesthesia?: yes  History of complications of general anesthesia?: no    ASA 3     MAC     The patient is not a current smoker.  Education provided regarding risk of obstructive sleep apnea.  intravenous induction   Anesthetic plan and risks discussed with patient.    Plan discussed with CRNA, GENO and attending.

## 2024-12-13 NOTE — ANESTHESIA POSTPROCEDURE EVALUATION
Patient: Rasheed Antonio    Procedure Summary       Date: 12/13/24 Room / Location: [facility]    Anesthesia Start: 1422 Anesthesia Stop: 1438    Procedure: EGD Diagnosis: Esophagitis    Scheduled Providers: Manan Pollock MD; Odell Diop MD Responsible Provider: Odell Diop MD    Anesthesia Type: MAC ASA Status: 3            Anesthesia Type: MAC    Vitals Value Taken Time   /78 12/13/24 1504   Temp 36.4 °C (97.5 °F) 12/13/24 1437   Pulse 80 12/13/24 1504   Resp 16 12/13/24 1500   SpO2 99 % 12/13/24 1504   Vitals shown include unfiled device data.    Anesthesia Post Evaluation    Patient location during evaluation: PACU  Patient participation: complete - patient participated  Level of consciousness: awake and alert  Pain management: adequate  Airway patency: patent  Cardiovascular status: acceptable  Respiratory status: acceptable  Hydration status: acceptable  Postoperative Nausea and Vomiting: none        No notable events documented.

## 2024-12-19 LAB
LABORATORY COMMENT REPORT: NORMAL
PATH REPORT.FINAL DX SPEC: NORMAL
PATH REPORT.GROSS SPEC: NORMAL
PATH REPORT.RELEVANT HX SPEC: NORMAL
PATH REPORT.TOTAL CANCER: NORMAL

## 2024-12-26 ENCOUNTER — APPOINTMENT (OUTPATIENT)
Dept: RADIOLOGY | Facility: HOSPITAL | Age: 84
End: 2024-12-26
Payer: MEDICARE

## 2024-12-26 ENCOUNTER — HOSPITAL ENCOUNTER (INPATIENT)
Facility: HOSPITAL | Age: 84
End: 2024-12-26
Attending: INTERNAL MEDICINE
Payer: MEDICARE

## 2024-12-26 ENCOUNTER — APPOINTMENT (OUTPATIENT)
Dept: CARDIOLOGY | Facility: HOSPITAL | Age: 84
End: 2024-12-26
Payer: MEDICARE

## 2024-12-26 DIAGNOSIS — D72.829 LEUKOCYTOSIS, UNSPECIFIED TYPE: ICD-10-CM

## 2024-12-26 DIAGNOSIS — I21.9 ACUTE MYOCARDIAL INFARCTION, UNSPECIFIED MI TYPE, UNSPECIFIED ARTERY (MULTI): Primary | ICD-10-CM

## 2024-12-26 DIAGNOSIS — J18.9 ACUTE PNEUMONIA: ICD-10-CM

## 2024-12-26 DIAGNOSIS — I10 HYPERTENSION, ESSENTIAL: ICD-10-CM

## 2024-12-26 DIAGNOSIS — U07.1 COVID-19: ICD-10-CM

## 2024-12-26 LAB
APTT PPP: 29 SECONDS (ref 27–38)
BASOPHILS # BLD AUTO: 0.05 X10*3/UL (ref 0–0.1)
BASOPHILS NFR BLD AUTO: 0.2 %
EOSINOPHIL # BLD AUTO: 0.05 X10*3/UL (ref 0–0.4)
EOSINOPHIL NFR BLD AUTO: 0.2 %
ERYTHROCYTE [DISTWIDTH] IN BLOOD BY AUTOMATED COUNT: 17 % (ref 11.5–14.5)
HCT VFR BLD AUTO: 27.7 % (ref 41–52)
HGB BLD-MCNC: 8.3 G/DL (ref 13.5–17.5)
IMM GRANULOCYTES # BLD AUTO: 0.13 X10*3/UL (ref 0–0.5)
IMM GRANULOCYTES NFR BLD AUTO: 0.6 % (ref 0–0.9)
INR PPP: 1.1 (ref 0.9–1.1)
LYMPHOCYTES # BLD AUTO: 0.57 X10*3/UL (ref 0.8–3)
LYMPHOCYTES NFR BLD AUTO: 2.8 %
MCH RBC QN AUTO: 24.2 PG (ref 26–34)
MCHC RBC AUTO-ENTMCNC: 30 G/DL (ref 32–36)
MCV RBC AUTO: 81 FL (ref 80–100)
MONOCYTES # BLD AUTO: 0.85 X10*3/UL (ref 0.05–0.8)
MONOCYTES NFR BLD AUTO: 4.2 %
NEUTROPHILS # BLD AUTO: 18.6 X10*3/UL (ref 1.6–5.5)
NEUTROPHILS NFR BLD AUTO: 92 %
NRBC BLD-RTO: 0 /100 WBCS (ref 0–0)
PLATELET # BLD AUTO: 357 X10*3/UL (ref 150–450)
PROTHROMBIN TIME: 12.2 SECONDS (ref 9.8–12.8)
RBC # BLD AUTO: 3.43 X10*6/UL (ref 4.5–5.9)
WBC # BLD AUTO: 20.3 X10*3/UL (ref 4.4–11.3)

## 2024-12-26 PROCEDURE — 85730 THROMBOPLASTIN TIME PARTIAL: CPT | Performed by: INTERNAL MEDICINE

## 2024-12-26 PROCEDURE — 87636 SARSCOV2 & INF A&B AMP PRB: CPT | Performed by: INTERNAL MEDICINE

## 2024-12-26 PROCEDURE — 71045 X-RAY EXAM CHEST 1 VIEW: CPT

## 2024-12-26 PROCEDURE — 71045 X-RAY EXAM CHEST 1 VIEW: CPT | Mod: FOREIGN READ | Performed by: RADIOLOGY

## 2024-12-26 PROCEDURE — 85610 PROTHROMBIN TIME: CPT | Performed by: INTERNAL MEDICINE

## 2024-12-26 PROCEDURE — 84484 ASSAY OF TROPONIN QUANT: CPT | Performed by: INTERNAL MEDICINE

## 2024-12-26 PROCEDURE — 86901 BLOOD TYPING SEROLOGIC RH(D): CPT | Performed by: INTERNAL MEDICINE

## 2024-12-26 PROCEDURE — 93005 ELECTROCARDIOGRAM TRACING: CPT

## 2024-12-26 PROCEDURE — 5A09457 ASSISTANCE WITH RESPIRATORY VENTILATION, 24-96 CONSECUTIVE HOURS, CONTINUOUS POSITIVE AIRWAY PRESSURE: ICD-10-PCS | Performed by: STUDENT IN AN ORGANIZED HEALTH CARE EDUCATION/TRAINING PROGRAM

## 2024-12-26 PROCEDURE — 83605 ASSAY OF LACTIC ACID: CPT | Performed by: INTERNAL MEDICINE

## 2024-12-26 PROCEDURE — 99291 CRITICAL CARE FIRST HOUR: CPT | Mod: 25 | Performed by: INTERNAL MEDICINE

## 2024-12-26 PROCEDURE — 82805 BLOOD GASES W/O2 SATURATION: CPT | Performed by: INTERNAL MEDICINE

## 2024-12-26 PROCEDURE — 83880 ASSAY OF NATRIURETIC PEPTIDE: CPT | Performed by: INTERNAL MEDICINE

## 2024-12-26 PROCEDURE — 84100 ASSAY OF PHOSPHORUS: CPT | Performed by: INTERNAL MEDICINE

## 2024-12-26 PROCEDURE — 2500000005 HC RX 250 GENERAL PHARMACY W/O HCPCS: Performed by: INTERNAL MEDICINE

## 2024-12-26 PROCEDURE — 85025 COMPLETE CBC W/AUTO DIFF WBC: CPT | Performed by: INTERNAL MEDICINE

## 2024-12-26 PROCEDURE — 87634 RSV DNA/RNA AMP PROBE: CPT

## 2024-12-26 PROCEDURE — 94660 CPAP INITIATION&MGMT: CPT

## 2024-12-26 PROCEDURE — 83735 ASSAY OF MAGNESIUM: CPT | Performed by: INTERNAL MEDICINE

## 2024-12-26 PROCEDURE — 80053 COMPREHEN METABOLIC PANEL: CPT | Performed by: INTERNAL MEDICINE

## 2024-12-26 PROCEDURE — 36415 COLL VENOUS BLD VENIPUNCTURE: CPT | Performed by: INTERNAL MEDICINE

## 2024-12-26 RX ORDER — CEFEPIME 1 G/50ML
2 INJECTION, SOLUTION INTRAVENOUS ONCE
Status: COMPLETED | OUTPATIENT
Start: 2024-12-26 | End: 2024-12-27

## 2024-12-26 RX ORDER — IPRATROPIUM BROMIDE AND ALBUTEROL SULFATE 2.5; .5 MG/3ML; MG/3ML
3 SOLUTION RESPIRATORY (INHALATION) ONCE
Status: COMPLETED | OUTPATIENT
Start: 2024-12-27 | End: 2024-12-27

## 2024-12-26 RX ORDER — VANCOMYCIN HYDROCHLORIDE 1 G/200ML
1 INJECTION, SOLUTION INTRAVENOUS ONCE
Status: COMPLETED | OUTPATIENT
Start: 2024-12-26 | End: 2024-12-27

## 2024-12-26 RX ADMIN — Medication 40 PERCENT: at 23:15

## 2024-12-26 ASSESSMENT — PAIN - FUNCTIONAL ASSESSMENT: PAIN_FUNCTIONAL_ASSESSMENT: 0-10

## 2024-12-26 ASSESSMENT — PAIN SCALES - GENERAL: PAINLEVEL_OUTOF10: 0 - NO PAIN

## 2024-12-27 ENCOUNTER — APPOINTMENT (OUTPATIENT)
Dept: RADIOLOGY | Facility: HOSPITAL | Age: 84
End: 2024-12-27
Payer: MEDICARE

## 2024-12-27 PROBLEM — I21.9: Status: ACTIVE | Noted: 2024-12-27

## 2024-12-27 LAB
ABO GROUP (TYPE) IN BLOOD: NORMAL
ALBUMIN SERPL BCP-MCNC: 4 G/DL (ref 3.4–5)
ALP SERPL-CCNC: 49 U/L (ref 33–136)
ALT SERPL W P-5'-P-CCNC: 5 U/L (ref 10–52)
ANION GAP SERPL CALC-SCNC: 16 MMOL/L (ref 10–20)
ANION GAP SERPL CALC-SCNC: 17 MMOL/L (ref 10–20)
ANTIBODY SCREEN: NORMAL
APPEARANCE UR: CLEAR
AST SERPL W P-5'-P-CCNC: 13 U/L (ref 9–39)
BASE EXCESS BLDV CALC-SCNC: -8.4 MMOL/L (ref -2–3)
BILIRUB SERPL-MCNC: 0.5 MG/DL (ref 0–1.2)
BILIRUB UR STRIP.AUTO-MCNC: NEGATIVE MG/DL
BNP SERPL-MCNC: 793 PG/ML (ref 0–99)
BODY TEMPERATURE: 37 DEGREES CELSIUS
BUN SERPL-MCNC: 25 MG/DL (ref 6–23)
BUN SERPL-MCNC: 29 MG/DL (ref 6–23)
CALCIUM SERPL-MCNC: 9 MG/DL (ref 8.6–10.3)
CALCIUM SERPL-MCNC: 9.2 MG/DL (ref 8.6–10.3)
CARDIAC TROPONIN I PNL SERPL HS: 1453 NG/L (ref 0–20)
CARDIAC TROPONIN I PNL SERPL HS: 464 NG/L (ref 0–20)
CARDIAC TROPONIN I PNL SERPL HS: ABNORMAL NG/L (ref 0–20)
CHLORIDE SERPL-SCNC: 110 MMOL/L (ref 98–107)
CHLORIDE SERPL-SCNC: 110 MMOL/L (ref 98–107)
CO2 SERPL-SCNC: 17 MMOL/L (ref 21–32)
CO2 SERPL-SCNC: 18 MMOL/L (ref 21–32)
COLOR UR: NORMAL
CREAT SERPL-MCNC: 1.88 MG/DL (ref 0.5–1.3)
CREAT SERPL-MCNC: 2.07 MG/DL (ref 0.5–1.3)
D DIMER PPP FEU-MCNC: 1075 NG/ML FEU
EGFRCR SERPLBLD CKD-EPI 2021: 31 ML/MIN/1.73M*2
EGFRCR SERPLBLD CKD-EPI 2021: 34 ML/MIN/1.73M*2
ERYTHROCYTE [DISTWIDTH] IN BLOOD BY AUTOMATED COUNT: 17.1 % (ref 11.5–14.5)
FLUAV RNA RESP QL NAA+PROBE: NOT DETECTED
FLUBV RNA RESP QL NAA+PROBE: NOT DETECTED
GLUCOSE SERPL-MCNC: 168 MG/DL (ref 74–99)
GLUCOSE SERPL-MCNC: 194 MG/DL (ref 74–99)
GLUCOSE UR STRIP.AUTO-MCNC: NORMAL MG/DL
HCO3 BLDV-SCNC: 17.2 MMOL/L (ref 22–26)
HCT VFR BLD AUTO: 28.2 % (ref 41–52)
HGB BLD-MCNC: 8.2 G/DL (ref 13.5–17.5)
HOLD SPECIMEN: NORMAL
INHALED O2 CONCENTRATION: 40 %
KETONES UR STRIP.AUTO-MCNC: NEGATIVE MG/DL
LACTATE SERPL-SCNC: 2.7 MMOL/L (ref 0.4–2)
LACTATE SERPL-SCNC: 3.3 MMOL/L (ref 0.4–2)
LACTATE SERPL-SCNC: 3.8 MMOL/L (ref 0.4–2)
LACTATE SERPL-SCNC: 3.9 MMOL/L (ref 0.4–2)
LEUKOCYTE ESTERASE UR QL STRIP.AUTO: NEGATIVE
MAGNESIUM SERPL-MCNC: 1.54 MG/DL (ref 1.6–2.4)
MAGNESIUM SERPL-MCNC: 2.2 MG/DL (ref 1.6–2.4)
MCH RBC QN AUTO: 24.2 PG (ref 26–34)
MCHC RBC AUTO-ENTMCNC: 29.1 G/DL (ref 32–36)
MCV RBC AUTO: 83 FL (ref 80–100)
MRSA DNA SPEC QL NAA+PROBE: NOT DETECTED
NITRITE UR QL STRIP.AUTO: NEGATIVE
NRBC BLD-RTO: 0 /100 WBCS (ref 0–0)
OXYHGB MFR BLDV: 45.1 % (ref 45–75)
PCO2 BLDV: 35 MM HG (ref 41–51)
PH BLDV: 7.3 PH (ref 7.33–7.43)
PH UR STRIP.AUTO: 5 [PH]
PHOSPHATE SERPL-MCNC: 3.4 MG/DL (ref 2.5–4.9)
PLATELET # BLD AUTO: 303 X10*3/UL (ref 150–450)
PO2 BLDV: 33 MM HG (ref 35–45)
POTASSIUM SERPL-SCNC: 3.4 MMOL/L (ref 3.5–5.3)
POTASSIUM SERPL-SCNC: 3.9 MMOL/L (ref 3.5–5.3)
PROT SERPL-MCNC: 7.3 G/DL (ref 6.4–8.2)
PROT UR STRIP.AUTO-MCNC: NORMAL MG/DL
RBC # BLD AUTO: 3.39 X10*6/UL (ref 4.5–5.9)
RBC # UR STRIP.AUTO: NEGATIVE /UL
RBC #/AREA URNS AUTO: NORMAL /HPF
RH FACTOR (ANTIGEN D): NORMAL
RSV RNA RESP QL NAA+PROBE: NOT DETECTED
SAO2 % BLDV: 46 % (ref 45–75)
SARS-COV-2 RNA RESP QL NAA+PROBE: DETECTED
SODIUM SERPL-SCNC: 140 MMOL/L (ref 136–145)
SODIUM SERPL-SCNC: 141 MMOL/L (ref 136–145)
SP GR UR STRIP.AUTO: 1.01
UFH PPP CHRO-ACNC: 0.3 IU/ML
UFH PPP CHRO-ACNC: 0.3 IU/ML
UROBILINOGEN UR STRIP.AUTO-MCNC: NORMAL MG/DL
WBC # BLD AUTO: 20.6 X10*3/UL (ref 4.4–11.3)
WBC #/AREA URNS AUTO: NORMAL /HPF

## 2024-12-27 PROCEDURE — 96375 TX/PRO/DX INJ NEW DRUG ADDON: CPT | Mod: 59

## 2024-12-27 PROCEDURE — 80048 BASIC METABOLIC PNL TOTAL CA: CPT

## 2024-12-27 PROCEDURE — 87449 NOS EACH ORGANISM AG IA: CPT | Mod: PARLAB

## 2024-12-27 PROCEDURE — 2500000004 HC RX 250 GENERAL PHARMACY W/ HCPCS (ALT 636 FOR OP/ED): Mod: JZ | Performed by: INTERNAL MEDICINE

## 2024-12-27 PROCEDURE — 84484 ASSAY OF TROPONIN QUANT: CPT | Performed by: INTERNAL MEDICINE

## 2024-12-27 PROCEDURE — 87081 CULTURE SCREEN ONLY: CPT | Mod: PARLAB | Performed by: INTERNAL MEDICINE

## 2024-12-27 PROCEDURE — 99223 1ST HOSP IP/OBS HIGH 75: CPT | Performed by: INTERNAL MEDICINE

## 2024-12-27 PROCEDURE — 3E0333Z INTRODUCTION OF ANTI-INFLAMMATORY INTO PERIPHERAL VEIN, PERCUTANEOUS APPROACH: ICD-10-PCS | Performed by: STUDENT IN AN ORGANIZED HEALTH CARE EDUCATION/TRAINING PROGRAM

## 2024-12-27 PROCEDURE — 36415 COLL VENOUS BLD VENIPUNCTURE: CPT | Performed by: INTERNAL MEDICINE

## 2024-12-27 PROCEDURE — 94640 AIRWAY INHALATION TREATMENT: CPT

## 2024-12-27 PROCEDURE — 2500000002 HC RX 250 W HCPCS SELF ADMINISTERED DRUGS (ALT 637 FOR MEDICARE OP, ALT 636 FOR OP/ED): Performed by: STUDENT IN AN ORGANIZED HEALTH CARE EDUCATION/TRAINING PROGRAM

## 2024-12-27 PROCEDURE — 85027 COMPLETE CBC AUTOMATED: CPT

## 2024-12-27 PROCEDURE — 2500000005 HC RX 250 GENERAL PHARMACY W/O HCPCS

## 2024-12-27 PROCEDURE — 2500000002 HC RX 250 W HCPCS SELF ADMINISTERED DRUGS (ALT 637 FOR MEDICARE OP, ALT 636 FOR OP/ED): Performed by: INTERNAL MEDICINE

## 2024-12-27 PROCEDURE — 85520 HEPARIN ASSAY: CPT | Performed by: STUDENT IN AN ORGANIZED HEALTH CARE EDUCATION/TRAINING PROGRAM

## 2024-12-27 PROCEDURE — 2500000001 HC RX 250 WO HCPCS SELF ADMINISTERED DRUGS (ALT 637 FOR MEDICARE OP): Performed by: PHYSICIAN ASSISTANT

## 2024-12-27 PROCEDURE — 2500000001 HC RX 250 WO HCPCS SELF ADMINISTERED DRUGS (ALT 637 FOR MEDICARE OP): Performed by: INTERNAL MEDICINE

## 2024-12-27 PROCEDURE — 2060000001 HC INTERMEDIATE ICU ROOM DAILY

## 2024-12-27 PROCEDURE — 83605 ASSAY OF LACTIC ACID: CPT

## 2024-12-27 PROCEDURE — 87641 MR-STAPH DNA AMP PROBE: CPT | Performed by: INTERNAL MEDICINE

## 2024-12-27 PROCEDURE — 99223 1ST HOSP IP/OBS HIGH 75: CPT | Performed by: STUDENT IN AN ORGANIZED HEALTH CARE EDUCATION/TRAINING PROGRAM

## 2024-12-27 PROCEDURE — 71045 X-RAY EXAM CHEST 1 VIEW: CPT

## 2024-12-27 PROCEDURE — 83605 ASSAY OF LACTIC ACID: CPT | Performed by: INTERNAL MEDICINE

## 2024-12-27 PROCEDURE — 2500000004 HC RX 250 GENERAL PHARMACY W/ HCPCS (ALT 636 FOR OP/ED)

## 2024-12-27 PROCEDURE — 51702 INSERT TEMP BLADDER CATH: CPT

## 2024-12-27 PROCEDURE — 71045 X-RAY EXAM CHEST 1 VIEW: CPT | Performed by: RADIOLOGY

## 2024-12-27 PROCEDURE — 96365 THER/PROPH/DIAG IV INF INIT: CPT | Mod: 59

## 2024-12-27 PROCEDURE — 94660 CPAP INITIATION&MGMT: CPT

## 2024-12-27 PROCEDURE — 87899 AGENT NOS ASSAY W/OPTIC: CPT | Mod: PARLAB

## 2024-12-27 PROCEDURE — 81001 URINALYSIS AUTO W/SCOPE: CPT

## 2024-12-27 PROCEDURE — 83735 ASSAY OF MAGNESIUM: CPT

## 2024-12-27 PROCEDURE — 85379 FIBRIN DEGRADATION QUANT: CPT

## 2024-12-27 PROCEDURE — 99222 1ST HOSP IP/OBS MODERATE 55: CPT

## 2024-12-27 PROCEDURE — XW033E5 INTRODUCTION OF REMDESIVIR ANTI-INFECTIVE INTO PERIPHERAL VEIN, PERCUTANEOUS APPROACH, NEW TECHNOLOGY GROUP 5: ICD-10-PCS | Performed by: STUDENT IN AN ORGANIZED HEALTH CARE EDUCATION/TRAINING PROGRAM

## 2024-12-27 PROCEDURE — 87040 BLOOD CULTURE FOR BACTERIA: CPT | Mod: PARLAB | Performed by: INTERNAL MEDICINE

## 2024-12-27 PROCEDURE — 85520 HEPARIN ASSAY: CPT

## 2024-12-27 PROCEDURE — 84484 ASSAY OF TROPONIN QUANT: CPT

## 2024-12-27 RX ORDER — DORZOLAMIDE HCL 20 MG/ML
1 SOLUTION/ DROPS OPHTHALMIC 3 TIMES DAILY
Status: DISCONTINUED | OUTPATIENT
Start: 2024-12-27 | End: 2025-01-02 | Stop reason: HOSPADM

## 2024-12-27 RX ORDER — AMLODIPINE BESYLATE 10 MG/1
10 TABLET ORAL DAILY
Status: DISCONTINUED | OUTPATIENT
Start: 2024-12-27 | End: 2024-12-28

## 2024-12-27 RX ORDER — NAPROXEN SODIUM 220 MG/1
81 TABLET, FILM COATED ORAL DAILY
Status: DISCONTINUED | OUTPATIENT
Start: 2024-12-27 | End: 2025-01-02 | Stop reason: HOSPADM

## 2024-12-27 RX ORDER — ALBUTEROL SULFATE 0.83 MG/ML
2.5 SOLUTION RESPIRATORY (INHALATION) EVERY 4 HOURS PRN
Status: DISCONTINUED | OUTPATIENT
Start: 2024-12-27 | End: 2024-12-27

## 2024-12-27 RX ORDER — IPRATROPIUM BROMIDE AND ALBUTEROL SULFATE 2.5; .5 MG/3ML; MG/3ML
3 SOLUTION RESPIRATORY (INHALATION)
Status: DISCONTINUED | OUTPATIENT
Start: 2024-12-27 | End: 2025-01-02 | Stop reason: HOSPADM

## 2024-12-27 RX ORDER — ACETAMINOPHEN 500 MG
5 TABLET ORAL NIGHTLY PRN
Status: DISCONTINUED | OUTPATIENT
Start: 2024-12-27 | End: 2025-01-02 | Stop reason: HOSPADM

## 2024-12-27 RX ORDER — POTASSIUM CHLORIDE 14.9 MG/ML
20 INJECTION INTRAVENOUS
Status: COMPLETED | OUTPATIENT
Start: 2024-12-27 | End: 2024-12-27

## 2024-12-27 RX ORDER — HEPARIN SODIUM 5000 [USP'U]/ML
60 INJECTION, SOLUTION INTRAVENOUS; SUBCUTANEOUS ONCE
Status: COMPLETED | OUTPATIENT
Start: 2024-12-27 | End: 2024-12-27

## 2024-12-27 RX ORDER — HEPARIN SODIUM 5000 [USP'U]/ML
INJECTION, SOLUTION INTRAVENOUS; SUBCUTANEOUS EVERY 4 HOURS PRN
Status: DISCONTINUED | OUTPATIENT
Start: 2024-12-27 | End: 2024-12-29

## 2024-12-27 RX ORDER — IPRATROPIUM BROMIDE AND ALBUTEROL SULFATE 2.5; .5 MG/3ML; MG/3ML
3 SOLUTION RESPIRATORY (INHALATION)
Status: DISCONTINUED | OUTPATIENT
Start: 2024-12-27 | End: 2024-12-27

## 2024-12-27 RX ORDER — SODIUM CHLORIDE 9 MG/ML
75 INJECTION, SOLUTION INTRAVENOUS CONTINUOUS
Status: ACTIVE | OUTPATIENT
Start: 2024-12-27 | End: 2024-12-28

## 2024-12-27 RX ORDER — PANTOPRAZOLE SODIUM 40 MG/1
40 TABLET, DELAYED RELEASE ORAL
Status: DISCONTINUED | OUTPATIENT
Start: 2024-12-28 | End: 2025-01-02 | Stop reason: HOSPADM

## 2024-12-27 RX ORDER — POLYETHYLENE GLYCOL 3350 17 G/17G
17 POWDER, FOR SOLUTION ORAL DAILY PRN
Status: DISCONTINUED | OUTPATIENT
Start: 2024-12-27 | End: 2024-12-27

## 2024-12-27 RX ORDER — ONDANSETRON HYDROCHLORIDE 2 MG/ML
4 INJECTION, SOLUTION INTRAVENOUS EVERY 6 HOURS PRN
Status: DISCONTINUED | OUTPATIENT
Start: 2024-12-27 | End: 2025-01-02 | Stop reason: HOSPADM

## 2024-12-27 RX ORDER — HEPARIN SODIUM 10000 [USP'U]/100ML
0-4000 INJECTION, SOLUTION INTRAVENOUS CONTINUOUS
Status: DISCONTINUED | OUTPATIENT
Start: 2024-12-27 | End: 2024-12-29

## 2024-12-27 RX ORDER — ACETAMINOPHEN 325 MG/1
650 TABLET ORAL EVERY 4 HOURS PRN
Status: DISCONTINUED | OUTPATIENT
Start: 2024-12-27 | End: 2025-01-02 | Stop reason: HOSPADM

## 2024-12-27 RX ORDER — PANTOPRAZOLE SODIUM 40 MG/10ML
40 INJECTION, POWDER, LYOPHILIZED, FOR SOLUTION INTRAVENOUS
Status: DISCONTINUED | OUTPATIENT
Start: 2024-12-27 | End: 2024-12-27

## 2024-12-27 RX ORDER — MAGNESIUM SULFATE HEPTAHYDRATE 40 MG/ML
2 INJECTION, SOLUTION INTRAVENOUS ONCE
Status: COMPLETED | OUTPATIENT
Start: 2024-12-27 | End: 2024-12-27

## 2024-12-27 RX ORDER — GUAIFENESIN 100 MG/5ML
200 LIQUID ORAL EVERY 4 HOURS PRN
Status: DISCONTINUED | OUTPATIENT
Start: 2024-12-27 | End: 2024-12-27

## 2024-12-27 RX ORDER — POLYETHYLENE GLYCOL 3350 17 G/17G
17 POWDER, FOR SOLUTION ORAL
Status: DISCONTINUED | OUTPATIENT
Start: 2024-12-27 | End: 2025-01-02 | Stop reason: HOSPADM

## 2024-12-27 RX ORDER — ALBUTEROL SULFATE 0.83 MG/ML
2.5 SOLUTION RESPIRATORY (INHALATION) EVERY 2 HOUR PRN
Status: DISCONTINUED | OUTPATIENT
Start: 2024-12-27 | End: 2025-01-02 | Stop reason: HOSPADM

## 2024-12-27 RX ORDER — DEXAMETHASONE SODIUM PHOSPHATE 10 MG/ML
6 INJECTION INTRAMUSCULAR; INTRAVENOUS DAILY
Status: DISCONTINUED | OUTPATIENT
Start: 2024-12-27 | End: 2025-01-02 | Stop reason: HOSPADM

## 2024-12-27 RX ORDER — GUAIFENESIN 600 MG/1
600 TABLET, EXTENDED RELEASE ORAL 2 TIMES DAILY
Status: DISCONTINUED | OUTPATIENT
Start: 2024-12-27 | End: 2025-01-02 | Stop reason: HOSPADM

## 2024-12-27 RX ORDER — LATANOPROST 50 UG/ML
1 SOLUTION/ DROPS OPHTHALMIC NIGHTLY
Status: DISCONTINUED | OUTPATIENT
Start: 2024-12-27 | End: 2025-01-02 | Stop reason: HOSPADM

## 2024-12-27 RX ADMIN — GUAIFENESIN 600 MG: 600 TABLET, EXTENDED RELEASE ORAL at 20:17

## 2024-12-27 RX ADMIN — MAGNESIUM SULFATE HEPTAHYDRATE 2 G: 40 INJECTION, SOLUTION INTRAVENOUS at 04:14

## 2024-12-27 RX ADMIN — Medication 28 PERCENT: at 19:05

## 2024-12-27 RX ADMIN — POLYETHYLENE GLYCOL 3350 17 G: 17 POWDER, FOR SOLUTION ORAL at 09:04

## 2024-12-27 RX ADMIN — IPRATROPIUM BROMIDE AND ALBUTEROL SULFATE 3 ML: .5; 3 SOLUTION RESPIRATORY (INHALATION) at 19:03

## 2024-12-27 RX ADMIN — IPRATROPIUM BROMIDE AND ALBUTEROL SULFATE 3 ML: .5; 3 SOLUTION RESPIRATORY (INHALATION) at 06:52

## 2024-12-27 RX ADMIN — PANTOPRAZOLE SODIUM 40 MG: 40 INJECTION, POWDER, FOR SOLUTION INTRAVENOUS at 06:43

## 2024-12-27 RX ADMIN — POTASSIUM CHLORIDE 20 MEQ: 14.9 INJECTION, SOLUTION INTRAVENOUS at 04:50

## 2024-12-27 RX ADMIN — CEFEPIME 1 G: 1 INJECTION, POWDER, FOR SOLUTION INTRAMUSCULAR; INTRAVENOUS at 12:42

## 2024-12-27 RX ADMIN — VANCOMYCIN HYDROCHLORIDE 1 G: 1 INJECTION, SOLUTION INTRAVENOUS at 00:56

## 2024-12-27 RX ADMIN — SODIUM CHLORIDE 75 ML/HR: 9 INJECTION, SOLUTION INTRAVENOUS at 03:34

## 2024-12-27 RX ADMIN — IPRATROPIUM BROMIDE AND ALBUTEROL SULFATE 3 ML: .5; 3 SOLUTION RESPIRATORY (INHALATION) at 13:36

## 2024-12-27 RX ADMIN — METHYLPREDNISOLONE SODIUM SUCCINATE 125 MG: 125 INJECTION, POWDER, FOR SOLUTION INTRAMUSCULAR; INTRAVENOUS at 00:18

## 2024-12-27 RX ADMIN — CEFEPIME 1 G: 1 INJECTION, POWDER, FOR SOLUTION INTRAMUSCULAR; INTRAVENOUS at 23:32

## 2024-12-27 RX ADMIN — AZITHROMYCIN MONOHYDRATE 500 MG: 500 INJECTION, POWDER, LYOPHILIZED, FOR SOLUTION INTRAVENOUS at 00:18

## 2024-12-27 RX ADMIN — REMDESIVIR 200 MG: 100 INJECTION, POWDER, LYOPHILIZED, FOR SOLUTION INTRAVENOUS at 03:34

## 2024-12-27 RX ADMIN — LATANOPROST 1 DROP: 50 SOLUTION/ DROPS OPHTHALMIC at 20:17

## 2024-12-27 RX ADMIN — DEXAMETHASONE SODIUM PHOSPHATE 6 MG: 10 INJECTION INTRAMUSCULAR; INTRAVENOUS at 03:18

## 2024-12-27 RX ADMIN — HEPARIN SODIUM 700 UNITS/HR: 10000 INJECTION, SOLUTION INTRAVENOUS at 01:03

## 2024-12-27 RX ADMIN — DORZOLAMIDE HCL 1 DROP: 20 SOLUTION/ DROPS OPHTHALMIC at 20:17

## 2024-12-27 RX ADMIN — IPRATROPIUM BROMIDE AND ALBUTEROL SULFATE 3 ML: .5; 3 SOLUTION RESPIRATORY (INHALATION) at 00:16

## 2024-12-27 RX ADMIN — ASPIRIN 81 MG CHEWABLE TABLET 81 MG: 81 TABLET CHEWABLE at 17:43

## 2024-12-27 RX ADMIN — POTASSIUM CHLORIDE 20 MEQ: 14.9 INJECTION, SOLUTION INTRAVENOUS at 06:42

## 2024-12-27 RX ADMIN — HEPARIN SODIUM 3600 UNITS: 5000 INJECTION INTRAVENOUS; SUBCUTANEOUS at 01:03

## 2024-12-27 RX ADMIN — CEFEPIME 2 G: 1 INJECTION, SOLUTION INTRAVENOUS at 00:17

## 2024-12-27 RX ADMIN — SODIUM CHLORIDE 75 ML/HR: 9 INJECTION, SOLUTION INTRAVENOUS at 23:32

## 2024-12-27 RX ADMIN — Medication 2 L/MIN: at 08:57

## 2024-12-27 SDOH — SOCIAL STABILITY: SOCIAL INSECURITY
WITHIN THE LAST YEAR, HAVE YOU BEEN RAPED OR FORCED TO HAVE ANY KIND OF SEXUAL ACTIVITY BY YOUR PARTNER OR EX-PARTNER?: NO

## 2024-12-27 SDOH — ECONOMIC STABILITY: INCOME INSECURITY: IN THE PAST 12 MONTHS HAS THE ELECTRIC, GAS, OIL, OR WATER COMPANY THREATENED TO SHUT OFF SERVICES IN YOUR HOME?: NO

## 2024-12-27 SDOH — SOCIAL STABILITY: SOCIAL INSECURITY: ARE THERE ANY APPARENT SIGNS OF INJURIES/BEHAVIORS THAT COULD BE RELATED TO ABUSE/NEGLECT?: NO

## 2024-12-27 SDOH — SOCIAL STABILITY: SOCIAL INSECURITY: WITHIN THE LAST YEAR, HAVE YOU BEEN AFRAID OF YOUR PARTNER OR EX-PARTNER?: NO

## 2024-12-27 SDOH — SOCIAL STABILITY: SOCIAL INSECURITY: WITHIN THE LAST YEAR, HAVE YOU BEEN HUMILIATED OR EMOTIONALLY ABUSED IN OTHER WAYS BY YOUR PARTNER OR EX-PARTNER?: NO

## 2024-12-27 SDOH — SOCIAL STABILITY: SOCIAL INSECURITY: DOES ANYONE TRY TO KEEP YOU FROM HAVING/CONTACTING OTHER FRIENDS OR DOING THINGS OUTSIDE YOUR HOME?: NO

## 2024-12-27 SDOH — ECONOMIC STABILITY: TRANSPORTATION INSECURITY: IN THE PAST 12 MONTHS, HAS LACK OF TRANSPORTATION KEPT YOU FROM MEDICAL APPOINTMENTS OR FROM GETTING MEDICATIONS?: NO

## 2024-12-27 SDOH — SOCIAL STABILITY: SOCIAL INSECURITY
WITHIN THE LAST YEAR, HAVE YOU BEEN KICKED, HIT, SLAPPED, OR OTHERWISE PHYSICALLY HURT BY YOUR PARTNER OR EX-PARTNER?: NO

## 2024-12-27 SDOH — ECONOMIC STABILITY: FOOD INSECURITY: WITHIN THE PAST 12 MONTHS, YOU WORRIED THAT YOUR FOOD WOULD RUN OUT BEFORE YOU GOT THE MONEY TO BUY MORE.: NEVER TRUE

## 2024-12-27 SDOH — SOCIAL STABILITY: SOCIAL INSECURITY: ARE YOU OR HAVE YOU BEEN THREATENED OR ABUSED PHYSICALLY, EMOTIONALLY, OR SEXUALLY BY ANYONE?: NO

## 2024-12-27 SDOH — HEALTH STABILITY: PHYSICAL HEALTH: ON AVERAGE, HOW MANY DAYS PER WEEK DO YOU ENGAGE IN MODERATE TO STRENUOUS EXERCISE (LIKE A BRISK WALK)?: 0 DAYS

## 2024-12-27 SDOH — ECONOMIC STABILITY: HOUSING INSECURITY: AT ANY TIME IN THE PAST 12 MONTHS, WERE YOU HOMELESS OR LIVING IN A SHELTER (INCLUDING NOW)?: NO

## 2024-12-27 SDOH — ECONOMIC STABILITY: HOUSING INSECURITY: IN THE LAST 12 MONTHS, WAS THERE A TIME WHEN YOU WERE NOT ABLE TO PAY THE MORTGAGE OR RENT ON TIME?: NO

## 2024-12-27 SDOH — SOCIAL STABILITY: SOCIAL INSECURITY: ABUSE: ADULT

## 2024-12-27 SDOH — ECONOMIC STABILITY: FOOD INSECURITY: WITHIN THE PAST 12 MONTHS, THE FOOD YOU BOUGHT JUST DIDN'T LAST AND YOU DIDN'T HAVE MONEY TO GET MORE.: NEVER TRUE

## 2024-12-27 SDOH — SOCIAL STABILITY: SOCIAL INSECURITY: HAVE YOU HAD THOUGHTS OF HARMING ANYONE ELSE?: NO

## 2024-12-27 SDOH — SOCIAL STABILITY: SOCIAL INSECURITY: WERE YOU ABLE TO COMPLETE ALL THE BEHAVIORAL HEALTH SCREENINGS?: NO

## 2024-12-27 SDOH — ECONOMIC STABILITY: FOOD INSECURITY: HOW HARD IS IT FOR YOU TO PAY FOR THE VERY BASICS LIKE FOOD, HOUSING, MEDICAL CARE, AND HEATING?: NOT VERY HARD

## 2024-12-27 SDOH — ECONOMIC STABILITY: HOUSING INSECURITY: IN THE PAST 12 MONTHS, HOW MANY TIMES HAVE YOU MOVED WHERE YOU WERE LIVING?: 1

## 2024-12-27 SDOH — SOCIAL STABILITY: SOCIAL INSECURITY: DO YOU FEEL ANYONE HAS EXPLOITED OR TAKEN ADVANTAGE OF YOU FINANCIALLY OR OF YOUR PERSONAL PROPERTY?: NO

## 2024-12-27 SDOH — SOCIAL STABILITY: SOCIAL INSECURITY: HAVE YOU HAD ANY THOUGHTS OF HARMING ANYONE ELSE?: NO

## 2024-12-27 SDOH — SOCIAL STABILITY: SOCIAL INSECURITY: HAS ANYONE EVER THREATENED TO HURT YOUR FAMILY OR YOUR PETS?: NO

## 2024-12-27 SDOH — SOCIAL STABILITY: SOCIAL INSECURITY: DO YOU FEEL UNSAFE GOING BACK TO THE PLACE WHERE YOU ARE LIVING?: NO

## 2024-12-27 ASSESSMENT — LIFESTYLE VARIABLES
HOW OFTEN DO YOU HAVE A DRINK CONTAINING ALCOHOL: NEVER
AUDIT-C TOTAL SCORE: 0
AUDIT-C TOTAL SCORE: 0
HOW OFTEN DO YOU HAVE 6 OR MORE DRINKS ON ONE OCCASION: NEVER
HOW MANY STANDARD DRINKS CONTAINING ALCOHOL DO YOU HAVE ON A TYPICAL DAY: PATIENT DOES NOT DRINK
SKIP TO QUESTIONS 9-10: 1

## 2024-12-27 ASSESSMENT — ACTIVITIES OF DAILY LIVING (ADL)
TOILETING: NEEDS ASSISTANCE
PATIENT'S MEMORY ADEQUATE TO SAFELY COMPLETE DAILY ACTIVITIES?: NO
JUDGMENT_ADEQUATE_SAFELY_COMPLETE_DAILY_ACTIVITIES: NO
ASSISTIVE_DEVICE: WALKER;WHEELCHAIR
LACK_OF_TRANSPORTATION: NO
WALKS IN HOME: NEEDS ASSISTANCE
BATHING: NEEDS ASSISTANCE
HEARING - LEFT EAR: HEARING AID
DRESSING YOURSELF: NEEDS ASSISTANCE
LACK_OF_TRANSPORTATION: NO
FEEDING YOURSELF: INDEPENDENT
ADEQUATE_TO_COMPLETE_ADL: YES
HEARING - RIGHT EAR: HEARING AID
GROOMING: NEEDS ASSISTANCE

## 2024-12-27 ASSESSMENT — PAIN SCALES - GENERAL
PAINLEVEL_OUTOF10: 0 - NO PAIN
PAINLEVEL_OUTOF10: 0 - NO PAIN

## 2024-12-27 ASSESSMENT — COGNITIVE AND FUNCTIONAL STATUS - GENERAL
CLIMB 3 TO 5 STEPS WITH RAILING: TOTAL
DRESSING REGULAR LOWER BODY CLOTHING: A LITTLE
HELP NEEDED FOR BATHING: A LOT
TURNING FROM BACK TO SIDE WHILE IN FLAT BAD: A LITTLE
MOBILITY SCORE: 15
DRESSING REGULAR UPPER BODY CLOTHING: A LOT
PERSONAL GROOMING: A LITTLE
STANDING UP FROM CHAIR USING ARMS: A LITTLE
MOVING TO AND FROM BED TO CHAIR: A LITTLE
MOVING FROM LYING ON BACK TO SITTING ON SIDE OF FLAT BED WITH BEDRAILS: A LITTLE
WALKING IN HOSPITAL ROOM: A LOT
DAILY ACTIVITIY SCORE: 17
PATIENT BASELINE BEDBOUND: NO
TOILETING: A LITTLE

## 2024-12-27 ASSESSMENT — ENCOUNTER SYMPTOMS
CONSTIPATION: 1
SHORTNESS OF BREATH: 1
EYES NEGATIVE: 1
ENDOCRINE NEGATIVE: 1
PSYCHIATRIC NEGATIVE: 1
CARDIOVASCULAR NEGATIVE: 1
HEMATOLOGIC/LYMPHATIC NEGATIVE: 1
MUSCULOSKELETAL NEGATIVE: 1
NEUROLOGICAL NEGATIVE: 1
CONSTITUTIONAL NEGATIVE: 1
ALLERGIC/IMMUNOLOGIC NEGATIVE: 1

## 2024-12-27 ASSESSMENT — PAIN - FUNCTIONAL ASSESSMENT: PAIN_FUNCTIONAL_ASSESSMENT: 0-10

## 2024-12-27 NOTE — H&P
History Of Present Illness  Rasheed piña is a 84-year-old male who presents to the ED with shortness of breath from assisted living [facility].  Patient has a past medical history of CKD, anemia, GERD, dementia, anxiety, and HTN.  Per ED note patient was brought in by EMS, EMS reported that patient was hypertensive in route with a blood pressure of 190/130, 1 sublingual nitro was given with blood pressure coming down to 112/60.  Patient reported that he felt better at that time.  Patient also wears oxygen chronically at 2 L NC patient placed on 5 L nasal cannula at nursing facility with oxygen saturations still 71%, EMS placed patient on CPAP.  When patient arrived to ED blood pressure was now hypotensive.  Daughter-in-law at bedside answering questions, she reports that patient went out with her and family on [place] [address] and did not show any signs of illness.  She denies any new out of the ordinary cough, fever, new weakness, vomiting, urinary frequency, or known syncopal episodes.  Patient reports he was walking to the bathroom today and that is when he realized he had such difficulty breathing.     ED Course      Hemodynamically Stable.    Vitals: Temp., , Resp.  20, /72, Pulse ox 100% BiPAP 40% FiO2     Labs: Glucose 194, Na+ 141, K+ 3.4, Bun 25, Creat.  1.88 GFR 34, Ca 9.2, Albumin 4.0, Alk Phos. 49, ALT 5, AST 13, , troponin 464 now 1453, Mg+ 1.54, Lactate 3.3, WBC 20.3,  Hgb.  8.3, Hct.  27.7, COVID-positive, VBG: pH 7.3, PO2 33, pCO2 35, base excess -8.4, HCO3 17.2, flu/RSV negative  Medications: Azithromycin, cefepime, heparin bolus, heparin infusion, DuoNeb, Solu-Medrol,  Imaging: interpreted by radiologist.  Chest x-ray: Pending      EKG: Not available for my review.  Interpreted by ED provider.  EKG of concern for acute ischemia.    Patient placed on BiPAP in ED, with improvement in mentation of patient.  Patient found to have EKG changes with elevated troponin so heparin drip was  initiated.    Past Medical History  Past Medical History:   Diagnosis Date    Personal history of other diseases of the respiratory system 09/28/2016    History of acute bronchitis    Personal history of other endocrine, nutritional and metabolic disease 01/13/2017    History of hyperlipidemia    Unspecified asthma with (acute) exacerbation ([facility]) 08/15/2018    Asthma exacerbation    Vitamin D deficiency, unspecified 02/11/2022    Vitamin D deficiency       Surgical History  None noted as patient is wearing BiPAP he is unable to contribute.     Social History  He reports that he has never smoked. He has never used smokeless tobacco. He reports that he does not currently use alcohol. He reports that he does not use drugs.  Patient currently lives at [facility].    Family History  Noncontributory.     Allergies  Grass pollen and Penicillins    Review of Systems    10 point review of systems was obtained from daughter-in-law.  Patient wearing BiPAP and not wearing his hearing aids so he is unable to participate fully in questions being asked.    Physical Exam  Constitutional:       General: He is awake. He is not in acute distress.     Appearance: Normal appearance. He is normal weight. He is not toxic-appearing.      Interventions: Face mask in place.      Comments: On Bi-pap   HENT:      Head: Normocephalic and atraumatic.      Nose: Nose normal. No rhinorrhea.      Mouth/Throat:      Mouth: Mucous membranes are dry.   Eyes:      General:         Right eye: No discharge.         Left eye: No discharge.      Conjunctiva/sclera: Conjunctivae normal.      Pupils: Pupils are equal, round, and reactive to light.   Cardiovascular:      Rate and Rhythm: Normal rate and regular rhythm.      Pulses: Normal pulses.   Pulmonary:      Effort: Pulmonary effort is normal. No respiratory distress.      Breath sounds: Examination of the right-upper field reveals rhonchi. Examination of the left-upper field  "reveals rhonchi. Examination of the right-middle field reveals rhonchi and rales. Examination of the left-middle field reveals rhonchi and rales. Examination of the right-lower field reveals rales. Examination of the left-lower field reveals rales. Rhonchi and rales present. No wheezing.   Abdominal:      General: Abdomen is protuberant. Bowel sounds are normal. There is distension.      Palpations: Abdomen is rigid.      Tenderness: There is no abdominal tenderness. There is no guarding.   Musculoskeletal:         General: Normal range of motion.      Cervical back: Normal range of motion and neck supple.      Right lower leg: No edema.      Left lower leg: No edema.   Skin:     General: Skin is warm and dry.   Neurological:      General: No focal deficit present.      Mental Status: He is alert.      Motor: No weakness.   Psychiatric:         Attention and Perception: Attention normal.         Mood and Affect: Mood normal.         Behavior: Behavior normal.          Last Recorded Vitals  Blood pressure 95/58, pulse 96, temperature 37.5 °C (99.5 °F), temperature source Temporal, resp. rate 20, height 1.626 m (5' 4\"), weight 60 kg (132 lb 4.4 oz), SpO2 97%.    Relevant Results  Results for orders placed or performed during the hospital encounter of 12/26/24 (from the past 24 hours)   CBC and Auto Differential   Result Value Ref Range    WBC 20.3 (H) 4.4 - 11.3 x10*3/uL    nRBC 0.0 0.0 - 0.0 /100 WBCs    RBC 3.43 (L) 4.50 - 5.90 x10*6/uL    Hemoglobin 8.3 (L) 13.5 - 17.5 g/dL    Hematocrit 27.7 (L) 41.0 - 52.0 %    MCV 81 80 - 100 fL    MCH 24.2 (L) 26.0 - 34.0 pg    MCHC 30.0 (L) 32.0 - 36.0 g/dL    RDW 17.0 (H) 11.5 - 14.5 %    Platelets 357 150 - 450 x10*3/uL    Neutrophils % 92.0 40.0 - 80.0 %    Immature Granulocytes %, Automated 0.6 0.0 - 0.9 %    Lymphocytes % 2.8 13.0 - 44.0 %    Monocytes % 4.2 2.0 - 10.0 %    Eosinophils % 0.2 0.0 - 6.0 %    Basophils % 0.2 0.0 - 2.0 %    Neutrophils Absolute 18.60 (H) 1.60 " - 5.50 x10*3/uL    Immature Granulocytes Absolute, Automated 0.13 0.00 - 0.50 x10*3/uL    Lymphocytes Absolute 0.57 (L) 0.80 - 3.00 x10*3/uL    Monocytes Absolute 0.85 (H) 0.05 - 0.80 x10*3/uL    Eosinophils Absolute 0.05 0.00 - 0.40 x10*3/uL    Basophils Absolute 0.05 0.00 - 0.10 x10*3/uL   Comprehensive metabolic panel   Result Value Ref Range    Glucose 194 (H) 74 - 99 mg/dL    Sodium 141 136 - 145 mmol/L    Potassium 3.4 (L) 3.5 - 5.3 mmol/L    Chloride 110 (H) 98 - 107 mmol/L    Bicarbonate 18 (L) 21 - 32 mmol/L    Anion Gap 16 10 - 20 mmol/L    Urea Nitrogen 25 (H) 6 - 23 mg/dL    Creatinine 1.88 (H) 0.50 - 1.30 mg/dL    eGFR 34 (L) >60 mL/min/1.73m*2    Calcium 9.2 8.6 - 10.3 mg/dL    Albumin 4.0 3.4 - 5.0 g/dL    Alkaline Phosphatase 49 33 - 136 U/L    Total Protein 7.3 6.4 - 8.2 g/dL    AST 13 9 - 39 U/L    Bilirubin, Total 0.5 0.0 - 1.2 mg/dL    ALT 5 (L) 10 - 52 U/L   Magnesium   Result Value Ref Range    Magnesium 1.54 (L) 1.60 - 2.40 mg/dL   Phosphorus   Result Value Ref Range    Phosphorus 3.4 2.5 - 4.9 mg/dL   Lactate   Result Value Ref Range    Lactate 3.3 (H) 0.4 - 2.0 mmol/L   B-Type Natriuretic Peptide   Result Value Ref Range     (H) 0 - 99 pg/mL   Protime-INR   Result Value Ref Range    Protime 12.2 9.8 - 12.8 seconds    INR 1.1 0.9 - 1.1   aPTT   Result Value Ref Range    aPTT 29 27 - 38 seconds   Type And Screen   Result Value Ref Range    ABO TYPE A     Rh TYPE NEG     ANTIBODY SCREEN NEG    Blood Gas Venous   Result Value Ref Range    POCT pH, Venous 7.30 (L) 7.33 - 7.43 pH    POCT pCO2, Venous 35 (L) 41 - 51 mm Hg    POCT pO2, Venous 33 (L) 35 - 45 mm Hg    POCT SO2, Venous 46 45 - 75 %    POCT Oxy Hemoglobin, Venous 45.1 45.0 - 75.0 %    POCT Base Excess, Venous -8.4 (L) -2.0 - 3.0 mmol/L    POCT HCO3 Calculated, Venous 17.2 (L) 22.0 - 26.0 mmol/L    Patient Temperature 37.0 degrees Celsius    FiO2 40 %   Troponin I, High Sensitivity, Initial   Result Value Ref Range    Troponin I,  High Sensitivity 464 (HH) 0 - 20 ng/L   Sars-CoV-2 PCR   Result Value Ref Range    Coronavirus 2019, PCR Detected (A) Not Detected   Influenza A, and B PCR   Result Value Ref Range    Flu A Result Not Detected Not Detected    Flu B Result Not Detected Not Detected   RSV PCR   Result Value Ref Range    RSV PCR Not Detected Not Detected   MRSA Surveillance for Vancomycin De-escalation, PCR    Specimen: Anterior Nares; Swab   Result Value Ref Range    MRSA PCR Not Detected Not Detected   Troponin, High Sensitivity, 1 Hour   Result Value Ref Range    Troponin I, High Sensitivity 1,453 (HH) 0 - 20 ng/L   Lactate   Result Value Ref Range    Lactate 2.7 (H) 0.4 - 2.0 mmol/L          Assessment/Plan   Assessment & Plan  Acute myocardial infarction, unspecified MI type, unspecified artery (Multi)        Patient arrived to ED today after being picked up from assisted living facility for shortness of breath and oxygen saturations at 71% with patient's baseline 2 L nasal cannula increased to 5 L.  Patient placed on CPAP and route by EMS.  Patient was also found to be hypertensive 190/130, EMS gave 1 nitro and patient became hypotensive but patient reports feeling better.  In ED EKG was obtained which showed ischemic changes and possible MI, troponins are reflecting that with initial troponin 464 and second troponin 1453.  Per ED physician spoke with daughter-in-law who is power of  and wants patient to remain DNR CC arrest DNI.  Family did not want cardiac catheterization so patient is being treated with a continuous heparin drip.  Cardiology consulted appreciate recs.  Patient also found to be COVID-positive, will treat with COVID protocol.  Chest x-ray pending.  Pulmonology consult placed appreciate recs.  Patient also found to have electrolyte imbalance, electrolytes repleted, will trend with morning labs.     Patient admitted to Dr. Nick Barker for further medical management.     # Myocardial infarction?  # EKG  changes  # Hypertensive PTA then Hypotensive  # Pulmonary Vascular Congestion  # Elevated BNP  # Elevated Troponin   # Hypokalemia  # Hypomagnesemia  -Continuous Heparin Drip, with heparin assays  -Cardiology consult  -Trend troponin x 1 more, initial Trope 464, 2nd trop 1453  -Replete magnesium and potassium  -N.p.o. diet while on BiPAP, may advance diet when appropriate to cardiac  -admitted to inpatient with telemetry    -q4 vitals   -morning labs, trend electrolytes, troponin, repeat VBG    # Sepsis   # COVID-positive  # Leukocytosis  # Lactic acidosis  # Abnormal VBG  -DuoNeb scheduled, albuterol as needed  -0.9 normal saline at 75 mL/hour until BiPAP is removed  -Initiate remdesivir  -Continue BiPAP  -Dexamethasone  -Antibiotics initiated in ED will continue cefepime, azithromycin  -Blood cultures in process  -Pulmonology consult  -Droplet plus precautions  -Strep pneumo/Legionella urine ordered/UA  -Respiratory culture ordered    Chronic Conditions   # CKD  # Anemia  # GERD  # Dementia  # Anxiety     Continue home medications as ordered when nursing completes home med rec.    PT/OT/SW   DNR cc Arrest-DNI     #DVT Prophylaxis   Scd's as tolerated   DVT Prophylaxis on continuous heparin         I spent 45 minutes in the professional and overall care of this patient.      Lu Luna, EMIR-CNP

## 2024-12-27 NOTE — PROGRESS NOTES
Physical Therapy                 Therapy Communication Note    Patient Name: Rasheed Antonio  MRN: [id number]  Department: [facility]  Room: [facility]/812-  Today's Date: 12/27/2024     Discipline: Physical Therapy    PT Missed Visit: Yes     Missed Visit Reason: Missed Visit Reason: Patient placed on medical hold (Spoke /c nursing. Pt pending palliative consult and n/a per medical status. Will hold at this time, reattempt as able/appropriate.)    Missed Time: Cancel

## 2024-12-27 NOTE — ED PROVIDER NOTES
HPI   Chief Complaint   Patient presents with    Shortness of Breath       Patient presented for evaluation of shortness of breath.  Patient reportedly coming from assisted living facility with shortness of breath.  Unclear as to how long symptoms have been present.  Patient normally on 2 L nasal cannula increased to 5 L nasal cannula via nursing facility.  Patient saturating 71% per EMS on their arrival.  EMS placed the patient on CPAP.  EMS noted the patient to be hypertensive at 190/130.  EMS had 2 blood pressures that were significant hypertensive.  EMS administered 1 sublingual nitro with change in blood pressure to 112/60.  Patient indicates he is feeling better.  Patient does have a history of dementia limiting history of present illness.      History provided by:  EMS personnel and patient  History limited by:  Dementia          Patient History   Past Medical History:   Diagnosis Date    Personal history of other diseases of the respiratory system 09/28/2016    History of acute bronchitis    Personal history of other endocrine, nutritional and metabolic disease 01/13/2017    History of hyperlipidemia    Unspecified asthma with (acute) exacerbation ([facility]) 08/15/2018    Asthma exacerbation    Vitamin D deficiency, unspecified 02/11/2022    Vitamin D deficiency     History reviewed. No pertinent surgical history.  No family history on file.  Social History     Tobacco Use    Smoking status: Never    Smokeless tobacco: Never   Vaping Use    Vaping status: Never Used   Substance Use Topics    Alcohol use: Not Currently    Drug use: Never       Physical Exam   ED Triage Vitals   Temp Heart Rate Respirations BP   -- 12/26/24 2327 12/26/24 2315 12/26/24 2327    100 (!) 33 88/51      Pulse Ox Temp Source Heart Rate Source Patient Position   12/26/24 2315 12/26/24 2327 12/26/24 2327 12/26/24 2327   100 % Temporal Monitor Sitting      BP Location FiO2 (%)     -- 12/26/24 2315      40 %       Physical Exam  Vitals  and nursing note reviewed.   Constitutional:       Appearance: He is ill-appearing.      Interventions: Face mask in place.   HENT:      Head: Atraumatic.      Right Ear: External ear normal.      Left Ear: External ear normal.      Nose: Nose normal.      Mouth/Throat:      Mouth: Mucous membranes are moist.      Pharynx: Oropharynx is clear.   Eyes:      Extraocular Movements: Extraocular movements intact.      Pupils: Pupils are equal, round, and reactive to light.   Cardiovascular:      Rate and Rhythm: Tachycardia present. Rhythm irregular.      Pulses: Normal pulses.   Pulmonary:      Effort: Pulmonary effort is normal. Tachypnea present.      Breath sounds: Examination of the right-middle field reveals rales. Examination of the left-middle field reveals rales. Examination of the right-lower field reveals rales. Examination of the left-lower field reveals rales. Rales present.   Abdominal:      Palpations: Abdomen is soft.      Tenderness: There is no abdominal tenderness.   Musculoskeletal:         General: No tenderness or signs of injury.      Cervical back: Normal range of motion and neck supple. No rigidity or tenderness.      Right lower leg: Edema present.      Left lower leg: Edema present.   Skin:     General: Skin is warm and dry.   Neurological:      Mental Status: He is alert. Mental status is at baseline. He is disoriented.           ED Course & MDM   ED Course as of 12/27/24 0049   Thu Dec 26, 2024   2352 Reviewed EKG of concern for acute ischemia.  Patient is DNR CCA.  Patient is disoriented to time.  Patient has a healthcare power of .  Discussed with the patient's healthcare power of  Shaniqua Antonio and she indicates the patient would not wish to undergo cardiac catheterization.  They are okay with the BiPAP at this time.  Agree with plan of antibiotics for likely pneumonia and admission. [JA]   Fri Dec 27, 2024   0004 Discussed with Dr. Mj mina for Dr. Sommers. Agrees with  plan of heparin and admission to stepdown given family wishes to avoid cardiac catheterization and stenting.  [JA]   0021 Discussed with the admitting [facility].  [JA]   0030 Discussed with Dr. Oneal and he agrees to consult.  [JA]      ED Course User Index  [JA] Jacky Reddy          Diagnoses as of 12/27/24 0049   Acute myocardial infarction, unspecified MI type, unspecified artery (Multi)   Acute pneumonia   Leukocytosis, unspecified type   COVID-19                 No data recorded     Shemar Coma Scale Score: 15 (12/26/24 2352 : Rosy Isaac RN)                           Medical Decision Making  Differential diagnosis: Hypertensive crisis, fluid overload, acute pulmonary edema, pneumonia, MI, PE, COVID-19, other    Patient presented for evaluation of hypertension and shortness of breath.  EMS noted hypertension and administered a CPAP as well as nitro.  Patient became profoundly hypotensive after 1 dose of nitroglycerin.  Concern for right sided MI.  Rales diffusely on exam.  Patient with recent admission for pneumonia.  EKG showing elevation in aVR with diffuse depression of the leads.  Consistent with global ischemia.  Concern for acute MI or multivessel disease.  Discussed with family regarding CODE STATUS with the patient is listed as DNR CCA.  They do not wish the patient to have compressions or intubation.  Patient is also not to undergo cardiac catheterization or procedures per family.  EKG showing pneumonia as well as pulmonary edema.  Patient also found to be COVID-19 positive.  Troponin elevated.  Given concern for multivessel disease heparin started in the ED.  Discussed with cardiology and they indicate heparin may not be necessary if the patient is not having chest pain at this time.  Patient denies chest pain.  Patient states he is short of breath although that shortness of breath is improving.  Patient continues to be on BiPAP at this time.  Previous discussions in the hospital with the patient  reviewed the possibility of hospice.  Discussed with the hospitalist covering the patient's primary care physician.  We agree with plan of admission to the ICU stepdown unit and will continue BiPAP and heparin.        Procedure  ECG 12 lead    Performed by: Jacky Reddy DO  Authorized by: Jacky Reddy DO    ECG interpreted by ED Physician in the absence of a cardiologist: yes    Comments:      12/26/2024 23: 28 sinus rhythm, rate 103, normal axis, ST elevation in aVR with diffuse depression of the leads, concern for global ischemia.  Abnormal EKG.  EKG interpreted by myself.  Critical Care    Performed by: Jacky Reddy DO  Authorized by: Jacky Reddy DO    Critical care provider statement:     Critical care time (minutes):  35    Critical care time was exclusive of:  Separately billable procedures and treating other patients    Critical care was necessary to treat or prevent imminent or life-threatening deterioration of the following conditions:  Circulatory failure and respiratory failure    Critical care was time spent personally by me on the following activities:  Discussions with consultants, ordering and performing treatments and interventions, ordering and review of laboratory studies, ordering and review of radiographic studies, pulse oximetry, re-evaluation of patient's condition, evaluation of patient's response to treatment, development of treatment plan with patient or surrogate and review of old charts       Jacky Reddy DO  12/27/24 0049

## 2024-12-27 NOTE — CONSULTS
"Reason For Consult  Difficult Dan catheter placement    History Of Present Illness  Rasheed Antonio is a 84 y.o. male presenting with COVID-pneumonia.  ER and nursing staff were unable to place a Dan catheter.  They state that there is resistance met in the mid urethra and that there was urethral bleeding.  Urology was consulted.     Past Medical History  He has a past medical history of Personal history of other diseases of the respiratory system (09/28/2016), Personal history of other endocrine, nutritional and metabolic disease (01/13/2017), Unspecified asthma with (acute) exacerbation ([facility]) (08/15/2018), and Vitamin D deficiency, unspecified (02/11/2022).    Surgical History  He has no past surgical history on file.     Social History  He reports that he has never smoked. He has never used smokeless tobacco. He reports that he does not currently use alcohol. He reports that he does not use drugs.    Family History  No family history on file.     Allergies  Grass pollen and Penicillins    Review of Systems       Physical Exam  The patient is awake alert and oriented and in no acute distress.  He has a CPAP or BiPAP device on.  Abdomen is slightly distended in the suprapubic area.  Nursing reports that he was scanned for approximately 1600 to 1700 cc.  External genitalia are within normal limits the urethral meatus is within normal limits.  There is no evidence of active bleeding.     Last Recorded Vitals  Blood pressure 103/69, pulse 94, temperature 36.7 °C (98.1 °F), resp. rate 20, height 1.626 m (5' 4\"), weight 60 kg (132 lb 4.4 oz), SpO2 99%.    Relevant Results.meds  Scheduled medications  [START ON 12/28/2024] azithromycin, 500 mg, intravenous, q24h  cefepime, 1 g, intravenous, q12h  dexAMETHasone, 6 mg, intravenous, Daily  guaiFENesin, 600 mg, oral, BID  ipratropium-albuteroL, 3 mL, nebulization, TID  oxygen, , inhalation, Continuous - Inhalation  pantoprazole, 40 mg, intravenous, Daily before " breakfast  pneumococcal polysaccharide, 0.5 mL, intramuscular, During hospitalization  potassium chloride, 20 mEq, intravenous, q2h  [START ON 12/28/2024] remdesivir, 100 mg, intravenous, q24h      Continuous medications  heparin, 0-4,000 Units/hr, Last Rate: 700 Units/hr (12/27/24 0648)  sodium chloride 0.9%, 75 mL/hr, Last Rate: 75 mL/hr (12/27/24 0648)      PRN medications  PRN medications: acetaminophen, albuterol, guaiFENesin, heparin, melatonin, ondansetron, polyethylene glycol       Results for orders placed or performed during the hospital encounter of 12/26/24 (from the past 24 hours)   CBC and Auto Differential   Result Value Ref Range    WBC 20.3 (H) 4.4 - 11.3 x10*3/uL    nRBC 0.0 0.0 - 0.0 /100 WBCs    RBC 3.43 (L) 4.50 - 5.90 x10*6/uL    Hemoglobin 8.3 (L) 13.5 - 17.5 g/dL    Hematocrit 27.7 (L) 41.0 - 52.0 %    MCV 81 80 - 100 fL    MCH 24.2 (L) 26.0 - 34.0 pg    MCHC 30.0 (L) 32.0 - 36.0 g/dL    RDW 17.0 (H) 11.5 - 14.5 %    Platelets 357 150 - 450 x10*3/uL    Neutrophils % 92.0 40.0 - 80.0 %    Immature Granulocytes %, Automated 0.6 0.0 - 0.9 %    Lymphocytes % 2.8 13.0 - 44.0 %    Monocytes % 4.2 2.0 - 10.0 %    Eosinophils % 0.2 0.0 - 6.0 %    Basophils % 0.2 0.0 - 2.0 %    Neutrophils Absolute 18.60 (H) 1.60 - 5.50 x10*3/uL    Immature Granulocytes Absolute, Automated 0.13 0.00 - 0.50 x10*3/uL    Lymphocytes Absolute 0.57 (L) 0.80 - 3.00 x10*3/uL    Monocytes Absolute 0.85 (H) 0.05 - 0.80 x10*3/uL    Eosinophils Absolute 0.05 0.00 - 0.40 x10*3/uL    Basophils Absolute 0.05 0.00 - 0.10 x10*3/uL   Comprehensive metabolic panel   Result Value Ref Range    Glucose 194 (H) 74 - 99 mg/dL    Sodium 141 136 - 145 mmol/L    Potassium 3.4 (L) 3.5 - 5.3 mmol/L    Chloride 110 (H) 98 - 107 mmol/L    Bicarbonate 18 (L) 21 - 32 mmol/L    Anion Gap 16 10 - 20 mmol/L    Urea Nitrogen 25 (H) 6 - 23 mg/dL    Creatinine 1.88 (H) 0.50 - 1.30 mg/dL    eGFR 34 (L) >60 mL/min/1.73m*2    Calcium 9.2 8.6 - 10.3 mg/dL     Albumin 4.0 3.4 - 5.0 g/dL    Alkaline Phosphatase 49 33 - 136 U/L    Total Protein 7.3 6.4 - 8.2 g/dL    AST 13 9 - 39 U/L    Bilirubin, Total 0.5 0.0 - 1.2 mg/dL    ALT 5 (L) 10 - 52 U/L   Magnesium   Result Value Ref Range    Magnesium 1.54 (L) 1.60 - 2.40 mg/dL   Phosphorus   Result Value Ref Range    Phosphorus 3.4 2.5 - 4.9 mg/dL   Lactate   Result Value Ref Range    Lactate 3.3 (H) 0.4 - 2.0 mmol/L   B-Type Natriuretic Peptide   Result Value Ref Range     (H) 0 - 99 pg/mL   Protime-INR   Result Value Ref Range    Protime 12.2 9.8 - 12.8 seconds    INR 1.1 0.9 - 1.1   aPTT   Result Value Ref Range    aPTT 29 27 - 38 seconds   Type And Screen   Result Value Ref Range    ABO TYPE A     Rh TYPE NEG     ANTIBODY SCREEN NEG    Blood Gas Venous   Result Value Ref Range    POCT pH, Venous 7.30 (L) 7.33 - 7.43 pH    POCT pCO2, Venous 35 (L) 41 - 51 mm Hg    POCT pO2, Venous 33 (L) 35 - 45 mm Hg    POCT SO2, Venous 46 45 - 75 %    POCT Oxy Hemoglobin, Venous 45.1 45.0 - 75.0 %    POCT Base Excess, Venous -8.4 (L) -2.0 - 3.0 mmol/L    POCT HCO3 Calculated, Venous 17.2 (L) 22.0 - 26.0 mmol/L    Patient Temperature 37.0 degrees Celsius    FiO2 40 %   Troponin I, High Sensitivity, Initial   Result Value Ref Range    Troponin I, High Sensitivity 464 (HH) 0 - 20 ng/L   Sars-CoV-2 PCR   Result Value Ref Range    Coronavirus 2019, PCR Detected (A) Not Detected   Influenza A, and B PCR   Result Value Ref Range    Flu A Result Not Detected Not Detected    Flu B Result Not Detected Not Detected   RSV PCR   Result Value Ref Range    RSV PCR Not Detected Not Detected   MRSA Surveillance for Vancomycin De-escalation, PCR    Specimen: Anterior Nares; Swab   Result Value Ref Range    MRSA PCR Not Detected Not Detected   Troponin, High Sensitivity, 1 Hour   Result Value Ref Range    Troponin I, High Sensitivity 1,453 (HH) 0 - 20 ng/L   Lactate   Result Value Ref Range    Lactate 2.7 (H) 0.4 - 2.0 mmol/L   Troponin I, High  Sensitivity   Result Value Ref Range    Troponin I, High Sensitivity 10,700 (HH) 0 - 20 ng/L   CBC   Result Value Ref Range    WBC 20.6 (H) 4.4 - 11.3 x10*3/uL    nRBC 0.0 0.0 - 0.0 /100 WBCs    RBC 3.39 (L) 4.50 - 5.90 x10*6/uL    Hemoglobin 8.2 (L) 13.5 - 17.5 g/dL    Hematocrit 28.2 (L) 41.0 - 52.0 %    MCV 83 80 - 100 fL    MCH 24.2 (L) 26.0 - 34.0 pg    MCHC 29.1 (L) 32.0 - 36.0 g/dL    RDW 17.1 (H) 11.5 - 14.5 %    Platelets 303 150 - 450 x10*3/uL   Lactate   Result Value Ref Range    Lactate 3.9 (H) 0.4 - 2.0 mmol/L   D-Dimer, Quantitative Non VTE   Result Value Ref Range    D-Dimer Non VTE, Quant (ng/mL FEU) 1,075 (H) <=500 ng/mL FEU   Basic metabolic panel   Result Value Ref Range    Glucose 168 (H) 74 - 99 mg/dL    Sodium 140 136 - 145 mmol/L    Potassium 3.9 3.5 - 5.3 mmol/L    Chloride 110 (H) 98 - 107 mmol/L    Bicarbonate 17 (L) 21 - 32 mmol/L    Anion Gap 17 10 - 20 mmol/L    Urea Nitrogen 29 (H) 6 - 23 mg/dL    Creatinine 2.07 (H) 0.50 - 1.30 mg/dL    eGFR 31 (L) >60 mL/min/1.73m*2    Calcium 9.0 8.6 - 10.3 mg/dL   Magnesium   Result Value Ref Range    Magnesium 2.20 1.60 - 2.40 mg/dL    XR chest 1 view    Result Date: 12/27/2024  STUDY: Chest Radiograph;  12/26/2024 11:54 PM INDICATION: Shortness of breath. COMPARISON: 10/16/2024 XR Chest ACCESSION NUMBER(S): [id number] ORDERING CLINICIAN: ZAINA MCDANIEL TECHNIQUE:  Frontal chest was obtained at 23:51 hours. FINDINGS: CARDIOMEDIASTINAL SILHOUETTE: Cardiomediastinal silhouette is stable in size and configuration.  LUNGS: Right basilar airspace disease/consolidation is demonstrated.  Left lung is grossly clear.  No overt edema.  No large pleural effusion. No pneumothorax.  ABDOMEN: No remarkable upper abdominal findings.  BONES: No acute osseous changes.    Right basilar airspace disease/consolidation most compatible with pneumonia. Signed by Alberto Yanez MD        Procedure note:  At the patient's bedside he is sterilely prepped and draped in the  normal surgical fashion the urethra was instilled with lubricating jelly.  A 16 English Dan catheter was then inserted into the urethra.  Some resistance is met mid urethra with continued pressure the catheter advances without much difficulty.  The catheter is advanced to the meatus at its hub.  The balloon is filled with 10 cc.  The catheter is pulled back to the bladder neck.  Clear yellow urine immediately drained.  The nurse attached the catheter to the patient's thigh with a StatLock.     Assessment/Plan     COVID-pneumonia  Urinary retention  Difficult Dan catheter placement    Dan catheter is draining without any blood.  Maintain Dan catheter for at least a week before attempting trial of void due to the large postvoid residual noted.    I spent 30 minutes in the professional and overall care of this patient.      Parish Herr MD

## 2024-12-27 NOTE — ED TRIAGE NOTES
Patient BIBA from [facility] facility with c/o severe shortness of breath. Patient was found at the facility 71% on baseline 3L nasal cannula. Facility administered 0.25mg morphine and albuterol treatment. EMS placed patient on CPAP and administered one nitro. Per EMS patient had audible rales.

## 2024-12-27 NOTE — CARE PLAN
This [facility] house officer was contacted by pharmacist Marah inquiring if I can order the patient's home medications.  Reviewed patient's telemetry monitoring at 9 AM today showing a QTc of 520.  I have held his twice daily Seroquel.

## 2024-12-27 NOTE — H&P
History Of Present Illness  Rasheed piña is a 84-year-old male who presents to the ED with shortness of breath from assisted living [facility].  Patient has a past medical history of CKD, anemia, GERD, dementia, anxiety, and HTN.  Per ED note patient was brought in by EMS, EMS reported that patient was hypertensive in route with a blood pressure of 190/130, 1 sublingual nitro was given with blood pressure coming down to 112/60.  Patient reported that he felt better at that time.  Patient also wears oxygen chronically at 2 L NC patient placed on 5 L nasal cannula at nursing facility with oxygen saturations still 71%, EMS placed patient on CPAP.  When patient arrived to ED blood pressure was now hypotensive.  Daughter-in-law at bedside answering questions, she reports that patient went out with her and family on [place] and did not show any signs of illness.  She denies any new out of the ordinary cough, fever, new weakness, vomiting, urinary frequency, or known syncopal episodes.  Patient reports he was walking to the bathroom today and that is when he realized he had such difficulty breathing.     ED Course      Hemodynamically Stable.    Vitals: Temp., , Resp.  20, /72, Pulse ox 100% BiPAP 40% FiO2     Labs: Glucose 194, Na+ 141, K+ 3.4, Bun 25, Creat.  1.88 GFR 34, Ca 9.2, Albumin 4.0, Alk Phos. 49, ALT 5, AST 13, , troponin 464 now 1453, Mg+ 1.54, Lactate 3.3, WBC 20.3,  Hgb.  8.3, Hct.  27.7, COVID-positive, VBG: pH 7.3, PO2 33, pCO2 35, base excess -8.4, HCO3 17.2, flu/RSV negative  Medications: Azithromycin, cefepime, heparin bolus, heparin infusion, DuoNeb, Solu-Medrol,  Imaging: interpreted by radiologist.  Chest x-ray: Pending      EKG: Not available for my review.  Interpreted by ED provider.  EKG of concern for acute ischemia.    Patient placed on BiPAP in ED, with improvement in mentation of patient.  Patient found to have EKG changes with elevated troponin so heparin drip was  initiated.    Past Medical History  Past Medical History:   Diagnosis Date    Personal history of other diseases of the respiratory system 09/28/2016    History of acute bronchitis    Personal history of other endocrine, nutritional and metabolic disease 01/13/2017    History of hyperlipidemia    Unspecified asthma with (acute) exacerbation ([facility]) 08/15/2018    Asthma exacerbation    Vitamin D deficiency, unspecified 02/11/2022    Vitamin D deficiency       Surgical History  None noted as patient is wearing BiPAP he is unable to contribute.     Social History  He reports that he has never smoked. He has never used smokeless tobacco. He reports that he does not currently use alcohol. He reports that he does not use drugs.  Patient currently lives at [facility].    Family History  Noncontributory.     Allergies  Grass pollen and Penicillins    Review of Systems    10 point review of systems was obtained from daughter-in-law.  Patient wearing BiPAP and not wearing his hearing aids so he is unable to participate fully in questions being asked.    Physical Exam  Constitutional:       General: He is awake. He is not in acute distress.     Appearance: Normal appearance. He is normal weight. He is not toxic-appearing.      Interventions: Face mask in place.      Comments: On Bi-pap   HENT:      Head: Normocephalic and atraumatic.      Nose: Nose normal. No rhinorrhea.      Mouth/Throat:      Mouth: Mucous membranes are dry.   Eyes:      General:         Right eye: No discharge.         Left eye: No discharge.      Conjunctiva/sclera: Conjunctivae normal.      Pupils: Pupils are equal, round, and reactive to light.   Cardiovascular:      Rate and Rhythm: Normal rate and regular rhythm.      Pulses: Normal pulses.   Pulmonary:      Effort: Pulmonary effort is normal. No respiratory distress.      Breath sounds: Examination of the right-upper field reveals rhonchi. Examination of the left-upper field  "reveals rhonchi. Examination of the right-middle field reveals rhonchi and rales. Examination of the left-middle field reveals rhonchi and rales. Examination of the right-lower field reveals rales. Examination of the left-lower field reveals rales. Rhonchi and rales present. No wheezing.   Abdominal:      General: Abdomen is protuberant. Bowel sounds are normal. There is distension.      Palpations: Abdomen is rigid.      Tenderness: There is no abdominal tenderness. There is no guarding.   Musculoskeletal:         General: Normal range of motion.      Cervical back: Normal range of motion and neck supple.      Right lower leg: No edema.      Left lower leg: No edema.   Skin:     General: Skin is warm and dry.   Neurological:      General: No focal deficit present.      Mental Status: He is alert.      Motor: No weakness.   Psychiatric:         Attention and Perception: Attention normal.         Mood and Affect: Mood normal.         Behavior: Behavior normal.          Last Recorded Vitals  Blood pressure 106/63, pulse 99, temperature 36.8 °C (98.2 °F), temperature source Temporal, resp. rate 22, height 1.626 m (5' 4\"), weight 60 kg (132 lb 4.4 oz), SpO2 97%.    Relevant Results  Results for orders placed or performed during the hospital encounter of 12/26/24 (from the past 24 hours)   CBC and Auto Differential   Result Value Ref Range    WBC 20.3 (H) 4.4 - 11.3 x10*3/uL    nRBC 0.0 0.0 - 0.0 /100 WBCs    RBC 3.43 (L) 4.50 - 5.90 x10*6/uL    Hemoglobin 8.3 (L) 13.5 - 17.5 g/dL    Hematocrit 27.7 (L) 41.0 - 52.0 %    MCV 81 80 - 100 fL    MCH 24.2 (L) 26.0 - 34.0 pg    MCHC 30.0 (L) 32.0 - 36.0 g/dL    RDW 17.0 (H) 11.5 - 14.5 %    Platelets 357 150 - 450 x10*3/uL    Neutrophils % 92.0 40.0 - 80.0 %    Immature Granulocytes %, Automated 0.6 0.0 - 0.9 %    Lymphocytes % 2.8 13.0 - 44.0 %    Monocytes % 4.2 2.0 - 10.0 %    Eosinophils % 0.2 0.0 - 6.0 %    Basophils % 0.2 0.0 - 2.0 %    Neutrophils Absolute 18.60 (H) " 1.60 - 5.50 x10*3/uL    Immature Granulocytes Absolute, Automated 0.13 0.00 - 0.50 x10*3/uL    Lymphocytes Absolute 0.57 (L) 0.80 - 3.00 x10*3/uL    Monocytes Absolute 0.85 (H) 0.05 - 0.80 x10*3/uL    Eosinophils Absolute 0.05 0.00 - 0.40 x10*3/uL    Basophils Absolute 0.05 0.00 - 0.10 x10*3/uL   Comprehensive metabolic panel   Result Value Ref Range    Glucose 194 (H) 74 - 99 mg/dL    Sodium 141 136 - 145 mmol/L    Potassium 3.4 (L) 3.5 - 5.3 mmol/L    Chloride 110 (H) 98 - 107 mmol/L    Bicarbonate 18 (L) 21 - 32 mmol/L    Anion Gap 16 10 - 20 mmol/L    Urea Nitrogen 25 (H) 6 - 23 mg/dL    Creatinine 1.88 (H) 0.50 - 1.30 mg/dL    eGFR 34 (L) >60 mL/min/1.73m*2    Calcium 9.2 8.6 - 10.3 mg/dL    Albumin 4.0 3.4 - 5.0 g/dL    Alkaline Phosphatase 49 33 - 136 U/L    Total Protein 7.3 6.4 - 8.2 g/dL    AST 13 9 - 39 U/L    Bilirubin, Total 0.5 0.0 - 1.2 mg/dL    ALT 5 (L) 10 - 52 U/L   Magnesium   Result Value Ref Range    Magnesium 1.54 (L) 1.60 - 2.40 mg/dL   Phosphorus   Result Value Ref Range    Phosphorus 3.4 2.5 - 4.9 mg/dL   Lactate   Result Value Ref Range    Lactate 3.3 (H) 0.4 - 2.0 mmol/L   B-Type Natriuretic Peptide   Result Value Ref Range     (H) 0 - 99 pg/mL   Protime-INR   Result Value Ref Range    Protime 12.2 9.8 - 12.8 seconds    INR 1.1 0.9 - 1.1   aPTT   Result Value Ref Range    aPTT 29 27 - 38 seconds   Type And Screen   Result Value Ref Range    ABO TYPE A     Rh TYPE NEG     ANTIBODY SCREEN NEG    Blood Gas Venous   Result Value Ref Range    POCT pH, Venous 7.30 (L) 7.33 - 7.43 pH    POCT pCO2, Venous 35 (L) 41 - 51 mm Hg    POCT pO2, Venous 33 (L) 35 - 45 mm Hg    POCT SO2, Venous 46 45 - 75 %    POCT Oxy Hemoglobin, Venous 45.1 45.0 - 75.0 %    POCT Base Excess, Venous -8.4 (L) -2.0 - 3.0 mmol/L    POCT HCO3 Calculated, Venous 17.2 (L) 22.0 - 26.0 mmol/L    Patient Temperature 37.0 degrees Celsius    FiO2 40 %   Troponin I, High Sensitivity, Initial   Result Value Ref Range     Troponin I, High Sensitivity 464 (HH) 0 - 20 ng/L   Sars-CoV-2 PCR   Result Value Ref Range    Coronavirus 2019, PCR Detected (A) Not Detected   Influenza A, and B PCR   Result Value Ref Range    Flu A Result Not Detected Not Detected    Flu B Result Not Detected Not Detected   RSV PCR   Result Value Ref Range    RSV PCR Not Detected Not Detected   Blood Culture    Specimen: Peripheral Venipuncture; Blood culture   Result Value Ref Range    Blood Culture Loaded on Instrument - Culture in progress    Blood Culture    Specimen: Peripheral Venipuncture; Blood culture   Result Value Ref Range    Blood Culture Loaded on Instrument - Culture in progress    MRSA Surveillance for Vancomycin De-escalation, PCR    Specimen: Anterior Nares; Swab   Result Value Ref Range    MRSA PCR Not Detected Not Detected   Troponin, High Sensitivity, 1 Hour   Result Value Ref Range    Troponin I, High Sensitivity 1,453 (HH) 0 - 20 ng/L   Lactate   Result Value Ref Range    Lactate 2.7 (H) 0.4 - 2.0 mmol/L   Troponin I, High Sensitivity   Result Value Ref Range    Troponin I, High Sensitivity 10,700 (HH) 0 - 20 ng/L   CBC   Result Value Ref Range    WBC 20.6 (H) 4.4 - 11.3 x10*3/uL    nRBC 0.0 0.0 - 0.0 /100 WBCs    RBC 3.39 (L) 4.50 - 5.90 x10*6/uL    Hemoglobin 8.2 (L) 13.5 - 17.5 g/dL    Hematocrit 28.2 (L) 41.0 - 52.0 %    MCV 83 80 - 100 fL    MCH 24.2 (L) 26.0 - 34.0 pg    MCHC 29.1 (L) 32.0 - 36.0 g/dL    RDW 17.1 (H) 11.5 - 14.5 %    Platelets 303 150 - 450 x10*3/uL   Lactate   Result Value Ref Range    Lactate 3.9 (H) 0.4 - 2.0 mmol/L   D-Dimer, Quantitative Non VTE   Result Value Ref Range    D-Dimer Non VTE, Quant (ng/mL FEU) 1,075 (H) <=500 ng/mL FEU   Basic metabolic panel   Result Value Ref Range    Glucose 168 (H) 74 - 99 mg/dL    Sodium 140 136 - 145 mmol/L    Potassium 3.9 3.5 - 5.3 mmol/L    Chloride 110 (H) 98 - 107 mmol/L    Bicarbonate 17 (L) 21 - 32 mmol/L    Anion Gap 17 10 - 20 mmol/L    Urea Nitrogen 29 (H) 6 - 23  mg/dL    Creatinine 2.07 (H) 0.50 - 1.30 mg/dL    eGFR 31 (L) >60 mL/min/1.73m*2    Calcium 9.0 8.6 - 10.3 mg/dL   Magnesium   Result Value Ref Range    Magnesium 2.20 1.60 - 2.40 mg/dL   Heparin Assay, UFH   Result Value Ref Range    Heparin Unfractionated 0.3 See Comment Below for Therapeutic Ranges IU/mL   Lactate   Result Value Ref Range    Lactate 3.8 (H) 0.4 - 2.0 mmol/L   Urinalysis with Reflex Culture and Microscopic   Result Value Ref Range    Color, Urine Light-Yellow Light-Yellow, Yellow, Dark-Yellow    Appearance, Urine Clear Clear    Specific Gravity, Urine 1.011 1.005 - 1.035    pH, Urine 5.0 5.0, 5.5, 6.0, 6.5, 7.0, 7.5, 8.0    Protein, Urine 10 (TRACE) NEGATIVE, 10 (TRACE), 20 (TRACE) mg/dL    Glucose, Urine Normal Normal mg/dL    Blood, Urine NEGATIVE NEGATIVE    Ketones, Urine NEGATIVE NEGATIVE mg/dL    Bilirubin, Urine NEGATIVE NEGATIVE    Urobilinogen, Urine Normal Normal mg/dL    Nitrite, Urine NEGATIVE NEGATIVE    Leukocyte Esterase, Urine NEGATIVE NEGATIVE   Urinalysis Microscopic   Result Value Ref Range    WBC, Urine 1-5 1-5, NONE /HPF    RBC, Urine NONE NONE, 1-2, 3-5 /HPF   Heparin Assay   Result Value Ref Range    Heparin Unfractionated 0.3 See Comment Below for Therapeutic Ranges IU/mL          Assessment/Plan   Assessment & Plan  Acute myocardial infarction, unspecified MI type, unspecified artery (Multi)        Patient arrived to ED today after being picked up from assisted living facility for shortness of breath and oxygen saturations at 71% with patient's baseline 2 L nasal cannula increased to 5 L.  Patient placed on CPAP and route by EMS.  Patient was also found to be hypertensive 190/130, EMS gave 1 nitro and patient became hypotensive but patient reports feeling better.  In ED EKG was obtained which showed ischemic changes and possible MI, troponins are reflecting that with initial troponin 464 and second troponin 1453.  Per ED physician spoke with daughter-in-law who is power of   and wants patient to remain DNR CC arrest DNI.  Family did not want cardiac catheterization so patient is being treated with a continuous heparin drip.  Cardiology consulted appreciate recs.  Patient also found to be COVID-positive, will treat with COVID protocol.  Chest x-ray pending.  Pulmonology consult placed appreciate recs.  Patient also found to have electrolyte imbalance, electrolytes repleted, will trend with morning labs.     Patient admitted to Dr. Nick Barker for further medical management.     # Myocardial infarction?  # EKG changes  # Hypertensive PTA then Hypotensive  # Pulmonary Vascular Congestion  # Elevated BNP  # Elevated Troponin   # Hypokalemia  # Hypomagnesemia  -Continuous Heparin Drip, with heparin assays  -Cardiology consult  -Trend troponin x 1 more, initial Trope 464, 2nd trop 1453  -Replete magnesium and potassium  -N.p.o. diet while on BiPAP, may advance diet when appropriate to cardiac  -admitted to inpatient with telemetry    -q4 vitals   -morning labs, trend electrolytes, troponin, repeat VBG    # Sepsis   # COVID-positive  # Leukocytosis  # Lactic acidosis  # Abnormal VBG  -DuoNeb scheduled, albuterol as needed  -0.9 normal saline at 75 mL/hour until BiPAP is removed  -Initiate remdesivir  -Continue BiPAP  -Dexamethasone  -Antibiotics initiated in ED will continue cefepime, azithromycin  -Blood cultures in process  -Pulmonology consult  -Droplet plus precautions  -Strep pneumo/Legionella urine ordered/UA  -Respiratory culture ordered    Chronic Conditions   # CKD  # Anemia  # GERD  # Dementia  # Anxiety     Continue home medications as ordered when nursing completes home med rec.    PT/OT/SW   DNR cc Arrest-DNI     #DVT Prophylaxis   Scd's as tolerated   DVT Prophylaxis on continuous heparin       12/27: doing better with conservative management, palliative consutled, heparin drip, echo pending      I spent 45 minutes in the professional and overall care of this  patient.      Nick Barker, DO

## 2024-12-27 NOTE — PROGRESS NOTES
12/27/24 1027   Discharge Planning   Living Arrangements Other (Comment)   Care Facility Name [facility]     Patient presented from [facility] where he was largely independent with ADLs.  Patient is awaiting cardiology and pulmonology recs; PT/OT evals pending.  Care Coordination team following for assistance with a safe discharge plan.  Geraldo NICOLE TCC

## 2024-12-27 NOTE — CONSULTS
Open speech box pulmonary Critical Care note    Patient Name :Rasheed Antonio  Date of service : 12/27/24  MRN: [id number]  YOB: 1940      Interval History:    History of Present Illness:      84 y.o. male  on day  0 of admission presenting with Acute myocardial infarction, unspecified MI type, unspecified artery (Multi).  From a nursing home with known history of CKD/anemia/GERD/dementia/anxiety/hypertension who presented to the ED with progressive hypertension blood systolic blood pressure of 190, increased oxygen demand as he required increased from 2 L baseline to currently at 6 to 8 L was noticed by family member during Saima he is to have increased work of breathing working out of ordinary, labs were pertinent for testing positive for COVID, chest x-ray showed bilateral infiltrates patient in the right lower lobe, ABG showed hypoxia without significant CO2 retention, RSV/COVID-negative, hemoglobin was low at 8.3 with bicarb mildly elevated WBC elevation at 20 also elevated both BNP and troponin  Past Medical/Surgical History:    has a past medical history of Personal history of other diseases of the respiratory system (09/28/2016), Personal history of other endocrine, nutritional and metabolic disease (01/13/2017), Unspecified asthma with (acute) exacerbation ([facility]) (08/15/2018), and Vitamin D deficiency, unspecified (02/11/2022).   has no past surgical history on file.   reports that he has never smoked. He has never used smokeless tobacco. He reports that he does not currently use alcohol. He reports that he does not use drugs.  Family History:   No relevant family history has been documented for this patient.    Allergies:     Grass pollen and Penicillins      Social history:   reports that he has never smoked. He has never used smokeless tobacco. He reports that he does not currently use alcohol. He reports that he does not use drugs.      Review of Systems:   General: denies weight  gain, denies loss of appetite, fever, chills, night sweats.  HEENT: denies headaches, dizziness, head trauma, visual changes, eye pain, tinnitus, nosebleeds, hoarseness or throat pain    Respiratory: denies chest pain, dyspnea, cough and hemoptysis  Cardiovascular: denies orthopnea, paroxysmal nocturnal dyspnea, leg swelling, and previous heart attack.    Gastrointestinal: denies pain, nausea vomiting, diarrhea, constipation, melena or bleeding.  Genitourinary: denies hematuria, frequency, urgency or dysuria  Neurology: denies syncope, seizures, paralysis, paraesthesia   Endocrine: denies polyuria, polydipsia, skin or hair changes, and heat or cold intolerance  Musculoskeletal: denies joint pain, swelling, arthritis or myalgia  Hematologic: denies bleeding, adenopathy and easy bruising  Skin: denies rashes and skin discoloration  Psychiatry: denies depression    Physical Exam:   Vital Signs:   Vitals:    12/27/24 1045   BP: 104/64   Pulse:    Resp: 20   Temp:    SpO2: 94%     Vitals:    12/26/24 2327   Weight: 60 kg (132 lb 4.4 oz)         Input/Output:    Intake/Output Summary (Last 24 hours) at 12/27/2024 1131  Last data filed at 12/27/2024 0900  Gross per 24 hour   Intake 382.75 ml   Output 3000 ml   Net -2617.25 ml       Oxygen requirements:    Ventilator Information:  FiO2 (%):  [21 %-40 %] 21 %  S RR:  [12] 12  Oxygen Therapy/Pulse Ox  Medical Gas Therapy: Supplemental oxygen  Medical Gas Delivery Method: Nasal cannula  FiO2 (%): 21 %  SpO2: 94 %  Patient Activity During SpO2 Measurement: At rest  Temp: 35.7 °C (96.3 °F)        General appearance: Not in pain or distress, in no respiratory distress    HEENT: Atraumatic/normocephalic, EOMI, BRITTANY, pharynx clear, moist mucosa, redness of the uvula appreciated,   Neck: Supple, no jugular venous distension, lymphadenopathy, thyromegaly or carotid bruits  Chest: Rhonchi  Cardiovascular: Normal S1, S2, regular rate and rhythm, no murmur, rub or gallop  Abdomen:  Normal sounds present, soft, lax with no tenderness, no hepatosplenomegaly, and no masses  Extremities: No edema. Pulses are equally present.   Skin: intact, no rashes   Neurologic: Alert and oriented x 3, No focal deficit         Current Medications:   Scheduled medications  [START ON 12/28/2024] azithromycin, 500 mg, intravenous, q24h  cefepime, 1 g, intravenous, q12h  dexAMETHasone, 6 mg, intravenous, Daily  guaiFENesin, 600 mg, oral, BID  ipratropium-albuteroL, 3 mL, nebulization, TID  oxygen, , inhalation, Continuous - Inhalation  pantoprazole, 40 mg, intravenous, Daily before breakfast  pneumococcal polysaccharide, 0.5 mL, intramuscular, During hospitalization  polyethylene glycol, 17 g, oral, q48h  [START ON 12/28/2024] remdesivir, 100 mg, intravenous, q24h      Continuous medications  heparin, 0-4,000 Units/hr, Last Rate: 700 Units/hr (12/27/24 0815)  sodium chloride 0.9%, 75 mL/hr, Last Rate: 75 mL/hr (12/27/24 0648)      PRN medications  PRN medications: acetaminophen, albuterol, guaiFENesin, heparin, melatonin, ondansetron      Investigations:  Labs, radiological imaging and cardiac work up were personally reviewed    Results from last 7 days   Lab Units 12/27/24  0617 12/26/24  2335   SODIUM mmol/L 140 141   POTASSIUM mmol/L 3.9 3.4*   CHLORIDE mmol/L 110* 110*   CO2 mmol/L 17* 18*   BUN mg/dL 29* 25*   CREATININE mg/dL 2.07* 1.88*   GLUCOSE mg/dL 168* 194*   CALCIUM mg/dL 9.0 9.2     Results from last 7 days   Lab Units 12/27/24  0616   WBC AUTO x10*3/uL 20.6*   HEMOGLOBIN g/dL 8.2*   HEMATOCRIT % 28.2*   PLATELETS AUTO x10*3/uL 303         XR chest 1 view    Result Date: 12/27/2024  STUDY: Chest Radiograph;  12/26/2024 11:54 PM INDICATION: Shortness of breath. COMPARISON: 10/16/2024 XR Chest ACCESSION NUMBER(S): [id number] ORDERING CLINICIAN: ZAINA MCDANIEL TECHNIQUE:  Frontal chest was obtained at 23:51 hours. FINDINGS: CARDIOMEDIASTINAL SILHOUETTE: Cardiomediastinal silhouette is stable in size and  configuration.  LUNGS: Right basilar airspace disease/consolidation is demonstrated.  Left lung is grossly clear.  No overt edema.  No large pleural effusion. No pneumothorax.  ABDOMEN: No remarkable upper abdominal findings.  BONES: No acute osseous changes.    Right basilar airspace disease/consolidation most compatible with pneumonia. Signed by Alberto Yanez MD    Esophagogastroduodenoscopy (EGD)    Result Date: 12/13/2024  Table formatting from the original result was not included. Impression 3 cm hiatal hernia The upper third of the esophagus and middle third of the esophagus appeared normal. Two tongues of Holliday-colored mucosa (~1 cm ) suspicion for Amanda's esophagus; performed cold forceps biopsy The cardia, fundus of the stomach, body of the stomach, incisura, antrum, prepyloric region and pylorus appeared normal. The duodenal bulb, 1st part of the duodenum and 2nd part of the duodenum appeared normal. Findings 3 cm hiatal hernia without Javier lesions present - GE junction 35 cm from the incisors, diaphragmatic impression 38 cm from the incisors, confirmed by retroflexion. Hill grade I hiatal hernia The upper third of the esophagus and middle third of the esophagus appeared normal. Amanda's esophagus observed with 2 tongues and no associated lesion without using a distal attachment cap. The Z-line was 35 cm from the incisors; performed cold forceps biopsy The cardia, fundus of the stomach, body of the stomach, incisura, antrum, prepyloric region and pylorus appeared normal. The duodenal bulb, 1st part of the duodenum and 2nd part of the duodenum appeared normal. Recommendation Await pathology results Follow up with PCP Protonix 40 mg once daily  Indication Esophagitis Staff Staff Role Manan Pollock MD Proceduralist Medications See Anesthesia Record. Preprocedure A history and physical has been performed, and patient medication allergies have been reviewed. The patient's tolerance of previous  anesthesia has been reviewed. The risks and benefits of the procedure and the sedation options and risks were discussed with the patient. All questions were answered and informed consent obtained. Details of the Procedure The patient underwent monitored anesthesia care, which was administered by an anesthesia professional. The patient's blood pressure, ECG, ETCO2, heart rate, level of consciousness, oxygen and respirations were monitored throughout the procedure. The scope was introduced through the mouth and advanced to the second part of the duodenum. Retroflexion was performed in the cardia. The patient experienced no blood loss. The procedure was not difficult. The patient tolerated the procedure well. There were no apparent adverse events. Events Procedure Events Event Event Time ENDO SCOPE IN TIME 12/13/2024  2:28 PM ENDO SCOPE OUT TIME 12/13/2024  2:32 PM Specimens ID Type Source Tests Collected by Time 1 : COLD BX Tissue ESOPHAGOGASTRIC JUNCTION BIOPSY SURGICAL PATHOLOGY EXAM Manan Pollock MD 12/13/2024 1431 Procedure Location [facility] [address] [facility] [id number] [phone] Referring Provider Kylie Danielle PA-C Procedure Provider Manan Pollock MD       Assessment  Rasheed Antonio IS 84 y.o. male  on day  0 of admission presenting with Acute myocardial infarction, unspecified MI type, unspecified artery (Multi). Actively being treated for the  following medical Problems:     84 y.o. male  on day  0 of admission presenting with Acute myocardial infarction, unspecified MI type, unspecified artery (Multi).  From a nursing home with known history of CKD/anemia/GERD/dementia/anxiety/hypertension who presented to the ED with progressive hypertension blood systolic blood pressure of 190, increased oxygen demand as he required increased from 2 L baseline to currently at 6 to 8 L was noticed by family member during Saima he is to have increased work of breathing  working out of ordinary, labs were pertinent for testing positive for COVID, chest x-ray showed bilateral infiltrates patient in the right lower lobe, ABG showed hypoxia without significant CO2 retention, RSV/COVID-negative, hemoglobin was low at 8.3 with bicarb mildly elevated WBC elevation at 20 also elevated both BNP and troponin  Acute on chronic hypoxia  Right lower lobe infiltrate with elevated white count  COVID-19 positive  Concern of non-ST elevation MI based on elevated troponin  Accelerated hypertension  Lactic acidosis likely multifactorial      Recommendation:  Due to hypoxia positive COVID-19 patient was started on combination of IV dexamethasone/remdesivir  Right lower lobe infiltrate in the context of underlying dementia does raise concern of aspiration, start patient on IV cefepime and azithromycin  Check nasal MRSA swab  Patient remains on IV heparin drip, cardiology evaluation is ongoing  Avoid volume overload add Mucinex/incentive spirometer  Repeat CBC with differential/procalcitonin in a.m.  Follow-up results 2D echo/trend troponin  Trend lactic acid  Airborne isolation  Repeat ABG in a.m., patient has no acute CO2 retention noninvasive ventilation could be discontinued if no significant CO2 retention noted and work of breathing improved  Will utilize heated high flow if necessary to maintain saturation target of 89 to 94%  Head of elevation aspiration precautions  Droplet plus isolation  Oxygen for saturation of 89 to 94%  Incentive spirometry  Bronchodilators therapy as needed  PT/OT  DVT prophylaxis  Minimize benzodiazepine and narcotics  Encourage ambulation  Head evaluation and aspiration precautions.  Hour ago  Javi Weinberg MD  Pulmonary critical care consultant  12/27/24  11:31 AM       STAFF PHYSICIAN NOTE OF PERSONAL INVOLVEMENT IN CARE  As the attending physician, I certify that I personally reviewed the patient's history and personally examined the patient to confirm the physical  findings described above, and that I reviewed the relevant imaging studies and available reports.  I also discussed the differential diagnosis and all of the proposed management plans with the patient and individuals accompanying the patient to this visit.  They had the opportunity to ask questions about the proposed management plans and to have those questions answered.

## 2024-12-27 NOTE — PROGRESS NOTES
Speech-Language Pathology                 Therapy Communication Note    Patient Name: Rasheed Antonio  MRN: [id number]  Department: [facility]  Room: [facility]812-  Today's Date: 12/27/2024     Discipline: Speech Language Pathology          Missed Visit Reason:  Conflict of service with pt actively receiving a breathing treatment with baseline congested coughing viewed during the treatment with mild WOB exhibited.     Missed Time: Attempt    Comment:   SLP to follow up the next date as pt available . D/w COREY Whitaker

## 2024-12-27 NOTE — CONSULTS
Inpatient consult to Palliative Care  Consult performed by: EMIR Leos-CNP  Consult ordered by: Nick Barker DO          Reason For Consult  Reason for Consult: communication / medical decision making     History Of Present Illness  Rasheed Antonio is a 84 y.o. male with past medical history of  dementia, chronic anemia, GERD, CKD, and anxiety, who was admitted under the medicine service 12/26/2024 from his AL with acute MI.  The patient had previously endorsed feeling short of breath especially while ambulating, and was noted to be hypoxemic for EMS, initially requiring CPAP then BiPAP.  Significant findings in the ED included twelve-lead ECG with ischemic changes as well as increasing troponins, with most recent 10,700; the patient was also noted to be COVID-positive.  Cardiology was consulted and did recommend cardiac catheterization, however, the patient's family did not elect for invasive intervention, and instead preferred conservative treatment with heparin infusion.  The patient's family further clarified the patient has DNR CCA DNI CODE STATUS.  Pulmonology was consulted due to hypoxemia and pulmonary vascular congestion on imaging.  Urology was consulted for urinary catheter placement.  Palliative care was consulted to discuss goals of care and advance care planning.    Upon assessment of the patient this morning, the patient was awake and stated he is feeling much better than when he got here.  The patient denied feeling pain and stated his breathing is much easier.  He did state he is feeling slightly constipated and said his last bowel movement was about 3 days ago.    ESAS ([place] Symptom Assessment System):  Pain: None  Shortness of breath: Mild  Loss of appetite: None  Nausea: None  Constipation: Mild  Tiredness: Mild  Drowsiness: Mild  Anxiety: None  Depression: None  How you feel overall: Good    PPS (Palliative Prognostic Scale): Previously 70 to 80%.    PMH: as above     Personal/Social  History  The patient previously lives in an AL.  Documentation supports he is a never smoker with no current alcohol nor illicit drug use.     Past Medical History  He has a past medical history of Personal history of other diseases of the respiratory system (09/28/2016), Personal history of other endocrine, nutritional and metabolic disease (01/13/2017), Unspecified asthma with (acute) exacerbation ([facility]) (08/15/2018), and Vitamin D deficiency, unspecified (02/11/2022).    Surgical History  He has no past surgical history on file.     Family History  No family history on file.  Allergies  Grass pollen and Penicillins    Review of Systems   Constitutional: Negative.    HENT: Negative.     Eyes: Negative.    Respiratory:  Positive for shortness of breath.    Cardiovascular: Negative.    Gastrointestinal:  Positive for constipation.   Endocrine: Negative.    Genitourinary: Negative.    Musculoskeletal: Negative.    Skin: Negative.    Allergic/Immunologic: Negative.    Neurological: Negative.    Hematological: Negative.    Psychiatric/Behavioral: Negative.          Physical Exam  Constitutional:       General: He is not in acute distress.     Appearance: He is normal weight. He is not toxic-appearing or diaphoretic.      Comments: Elderly, pleasantly confused   HENT:      Head: Normocephalic and atraumatic.      Right Ear: External ear normal.      Left Ear: External ear normal.      Nose: Nose normal.      Mouth/Throat:      Mouth: Mucous membranes are moist.      Pharynx: Oropharynx is clear.   Eyes:      Extraocular Movements: Extraocular movements intact.      Pupils: Pupils are equal, round, and reactive to light.   Cardiovascular:      Rate and Rhythm: Normal rate and regular rhythm.      Pulses: Normal pulses.      Heart sounds: Normal heart sounds.   Pulmonary:      Effort: Pulmonary effort is normal. No respiratory distress.      Comments: Coarse lung sounds on expiration, greater in the right lung than  "left  Abdominal:      General: Bowel sounds are normal. There is no distension.      Palpations: Abdomen is soft.      Tenderness: There is no abdominal tenderness.   Genitourinary:     Comments: Indwelling urinary catheter in place  Musculoskeletal:         General: Normal range of motion.      Cervical back: Normal range of motion.      Right lower leg: Edema present.      Left lower leg: Edema present.   Skin:     General: Skin is warm and dry.   Neurological:      General: No focal deficit present.      Mental Status: He is alert. He is disoriented.      Motor: Weakness present.      Comments: A & O x 2   Psychiatric:         Mood and Affect: Mood normal.         Behavior: Behavior normal.         Thought Content: Thought content normal.      Comments: Without restlessness         Last Recorded Vitals  Blood pressure 94/61, pulse 88, temperature 35.7 °C (96.3 °F), temperature source Temporal, resp. rate 22, height 1.626 m (5' 4\"), weight 60 kg (132 lb 4.4 oz), SpO2 94%.    Relevant Results  Scheduled medications  [START ON 12/28/2024] azithromycin, 500 mg, intravenous, q24h  cefepime, 1 g, intravenous, q12h  dexAMETHasone, 6 mg, intravenous, Daily  guaiFENesin, 600 mg, oral, BID  ipratropium-albuteroL, 3 mL, nebulization, TID  oxygen, , inhalation, Continuous - Inhalation  pantoprazole, 40 mg, intravenous, Daily before breakfast  pneumococcal polysaccharide, 0.5 mL, intramuscular, During hospitalization  potassium chloride, 20 mEq, intravenous, q2h  [START ON 12/28/2024] remdesivir, 100 mg, intravenous, q24h      Continuous medications  heparin, 0-4,000 Units/hr, Last Rate: 700 Units/hr (12/27/24 0815)  sodium chloride 0.9%, 75 mL/hr, Last Rate: 75 mL/hr (12/27/24 0648)      PRN medications  PRN medications: acetaminophen, albuterol, guaiFENesin, heparin, melatonin, ondansetron, polyethylene glycol    Results for orders placed or performed during the hospital encounter of 12/26/24 (from the past 96 hours)   CBC " and Auto Differential   Result Value Ref Range    WBC 20.3 (H) 4.4 - 11.3 x10*3/uL    nRBC 0.0 0.0 - 0.0 /100 WBCs    RBC 3.43 (L) 4.50 - 5.90 x10*6/uL    Hemoglobin 8.3 (L) 13.5 - 17.5 g/dL    Hematocrit 27.7 (L) 41.0 - 52.0 %    MCV 81 80 - 100 fL    MCH 24.2 (L) 26.0 - 34.0 pg    MCHC 30.0 (L) 32.0 - 36.0 g/dL    RDW 17.0 (H) 11.5 - 14.5 %    Platelets 357 150 - 450 x10*3/uL    Neutrophils % 92.0 40.0 - 80.0 %    Immature Granulocytes %, Automated 0.6 0.0 - 0.9 %    Lymphocytes % 2.8 13.0 - 44.0 %    Monocytes % 4.2 2.0 - 10.0 %    Eosinophils % 0.2 0.0 - 6.0 %    Basophils % 0.2 0.0 - 2.0 %    Neutrophils Absolute 18.60 (H) 1.60 - 5.50 x10*3/uL    Immature Granulocytes Absolute, Automated 0.13 0.00 - 0.50 x10*3/uL    Lymphocytes Absolute 0.57 (L) 0.80 - 3.00 x10*3/uL    Monocytes Absolute 0.85 (H) 0.05 - 0.80 x10*3/uL    Eosinophils Absolute 0.05 0.00 - 0.40 x10*3/uL    Basophils Absolute 0.05 0.00 - 0.10 x10*3/uL   Comprehensive metabolic panel   Result Value Ref Range    Glucose 194 (H) 74 - 99 mg/dL    Sodium 141 136 - 145 mmol/L    Potassium 3.4 (L) 3.5 - 5.3 mmol/L    Chloride 110 (H) 98 - 107 mmol/L    Bicarbonate 18 (L) 21 - 32 mmol/L    Anion Gap 16 10 - 20 mmol/L    Urea Nitrogen 25 (H) 6 - 23 mg/dL    Creatinine 1.88 (H) 0.50 - 1.30 mg/dL    eGFR 34 (L) >60 mL/min/1.73m*2    Calcium 9.2 8.6 - 10.3 mg/dL    Albumin 4.0 3.4 - 5.0 g/dL    Alkaline Phosphatase 49 33 - 136 U/L    Total Protein 7.3 6.4 - 8.2 g/dL    AST 13 9 - 39 U/L    Bilirubin, Total 0.5 0.0 - 1.2 mg/dL    ALT 5 (L) 10 - 52 U/L   Magnesium   Result Value Ref Range    Magnesium 1.54 (L) 1.60 - 2.40 mg/dL   Phosphorus   Result Value Ref Range    Phosphorus 3.4 2.5 - 4.9 mg/dL   Lactate   Result Value Ref Range    Lactate 3.3 (H) 0.4 - 2.0 mmol/L   B-Type Natriuretic Peptide   Result Value Ref Range     (H) 0 - 99 pg/mL   Protime-INR   Result Value Ref Range    Protime 12.2 9.8 - 12.8 seconds    INR 1.1 0.9 - 1.1   aPTT   Result Value  Ref Range    aPTT 29 27 - 38 seconds   Type And Screen   Result Value Ref Range    ABO TYPE A     Rh TYPE NEG     ANTIBODY SCREEN NEG    Blood Gas Venous   Result Value Ref Range    POCT pH, Venous 7.30 (L) 7.33 - 7.43 pH    POCT pCO2, Venous 35 (L) 41 - 51 mm Hg    POCT pO2, Venous 33 (L) 35 - 45 mm Hg    POCT SO2, Venous 46 45 - 75 %    POCT Oxy Hemoglobin, Venous 45.1 45.0 - 75.0 %    POCT Base Excess, Venous -8.4 (L) -2.0 - 3.0 mmol/L    POCT HCO3 Calculated, Venous 17.2 (L) 22.0 - 26.0 mmol/L    Patient Temperature 37.0 degrees Celsius    FiO2 40 %   Troponin I, High Sensitivity, Initial   Result Value Ref Range    Troponin I, High Sensitivity 464 (HH) 0 - 20 ng/L   Sars-CoV-2 PCR   Result Value Ref Range    Coronavirus 2019, PCR Detected (A) Not Detected   Influenza A, and B PCR   Result Value Ref Range    Flu A Result Not Detected Not Detected    Flu B Result Not Detected Not Detected   RSV PCR   Result Value Ref Range    RSV PCR Not Detected Not Detected   MRSA Surveillance for Vancomycin De-escalation, PCR    Specimen: Anterior Nares; Swab   Result Value Ref Range    MRSA PCR Not Detected Not Detected   Troponin, High Sensitivity, 1 Hour   Result Value Ref Range    Troponin I, High Sensitivity 1,453 (HH) 0 - 20 ng/L   Lactate   Result Value Ref Range    Lactate 2.7 (H) 0.4 - 2.0 mmol/L   Troponin I, High Sensitivity   Result Value Ref Range    Troponin I, High Sensitivity 10,700 (HH) 0 - 20 ng/L   CBC   Result Value Ref Range    WBC 20.6 (H) 4.4 - 11.3 x10*3/uL    nRBC 0.0 0.0 - 0.0 /100 WBCs    RBC 3.39 (L) 4.50 - 5.90 x10*6/uL    Hemoglobin 8.2 (L) 13.5 - 17.5 g/dL    Hematocrit 28.2 (L) 41.0 - 52.0 %    MCV 83 80 - 100 fL    MCH 24.2 (L) 26.0 - 34.0 pg    MCHC 29.1 (L) 32.0 - 36.0 g/dL    RDW 17.1 (H) 11.5 - 14.5 %    Platelets 303 150 - 450 x10*3/uL   Lactate   Result Value Ref Range    Lactate 3.9 (H) 0.4 - 2.0 mmol/L   D-Dimer, Quantitative Non VTE   Result Value Ref Range    D-Dimer Non VTE, Quant  (ng/mL FEU) 1,075 (H) <=500 ng/mL FEU   Basic metabolic panel   Result Value Ref Range    Glucose 168 (H) 74 - 99 mg/dL    Sodium 140 136 - 145 mmol/L    Potassium 3.9 3.5 - 5.3 mmol/L    Chloride 110 (H) 98 - 107 mmol/L    Bicarbonate 17 (L) 21 - 32 mmol/L    Anion Gap 17 10 - 20 mmol/L    Urea Nitrogen 29 (H) 6 - 23 mg/dL    Creatinine 2.07 (H) 0.50 - 1.30 mg/dL    eGFR 31 (L) >60 mL/min/1.73m*2    Calcium 9.0 8.6 - 10.3 mg/dL   Magnesium   Result Value Ref Range    Magnesium 2.20 1.60 - 2.40 mg/dL   Heparin Assay, UFH   Result Value Ref Range    Heparin Unfractionated 0.3 See Comment Below for Therapeutic Ranges IU/mL     XR chest 1 view    Result Date: 12/27/2024  STUDY: Chest Radiograph;  12/26/2024 11:54 PM INDICATION: Shortness of breath. COMPARISON: 10/16/2024 XR Chest ACCESSION NUMBER(S): [id number] ORDERING CLINICIAN: ZAINA MCDANIEL TECHNIQUE:  Frontal chest was obtained at 23:51 hours. FINDINGS: CARDIOMEDIASTINAL SILHOUETTE: Cardiomediastinal silhouette is stable in size and configuration.  LUNGS: Right basilar airspace disease/consolidation is demonstrated.  Left lung is grossly clear.  No overt edema.  No large pleural effusion. No pneumothorax.  ABDOMEN: No remarkable upper abdominal findings.  BONES: No acute osseous changes.    Right basilar airspace disease/consolidation most compatible with pneumonia. Signed by Alberto Yanez MD    Esophagogastroduodenoscopy (EGD)    Result Date: 12/13/2024  Table formatting from the original result was not included. Impression 3 cm hiatal hernia The upper third of the esophagus and middle third of the esophagus appeared normal. Two tongues of [place]-colored mucosa (~1 cm ) suspicion for Amanda's esophagus; performed cold forceps biopsy The cardia, fundus of the stomach, body of the stomach, incisura, antrum, prepyloric region and pylorus appeared normal. The duodenal bulb, 1st part of the duodenum and 2nd part of the duodenum appeared normal. Findings 3 cm  hiatal hernia without Javier lesions present - GE junction 35 cm from the incisors, diaphragmatic impression 38 cm from the incisors, confirmed by retroflexion. Hill grade I hiatal hernia The upper third of the esophagus and middle third of the esophagus appeared normal. Amanda's esophagus observed with 2 tongues and no associated lesion without using a distal attachment cap. The Z-line was 35 cm from the incisors; performed cold forceps biopsy The cardia, fundus of the stomach, body of the stomach, incisura, antrum, prepyloric region and pylorus appeared normal. The duodenal bulb, 1st part of the duodenum and 2nd part of the duodenum appeared normal. Recommendation Await pathology results Follow up with PCP Protonix 40 mg once daily  Indication Esophagitis Staff Staff Role Manan Pollock MD Proceduralist Medications See Anesthesia Record. Preprocedure A history and physical has been performed, and patient medication allergies have been reviewed. The patient's tolerance of previous anesthesia has been reviewed. The risks and benefits of the procedure and the sedation options and risks were discussed with the patient. All questions were answered and informed consent obtained. Details of the Procedure The patient underwent monitored anesthesia care, which was administered by an anesthesia professional. The patient's blood pressure, ECG, ETCO2, heart rate, level of consciousness, oxygen and respirations were monitored throughout the procedure. The scope was introduced through the mouth and advanced to the second part of the duodenum. Retroflexion was performed in the cardia. The patient experienced no blood loss. The procedure was not difficult. The patient tolerated the procedure well. There were no apparent adverse events. Events Procedure Events Event Event Time ENDO SCOPE IN TIME 12/13/2024  2:28 PM ENDO SCOPE OUT TIME 12/13/2024  2:32 PM Specimens ID Type Source Tests Collected by Time 1 : COLD BX Tissue  ESOPHAGOGASTRIC JUNCTION BIOPSY SURGICAL PATHOLOGY EXAM Manan Pollock MD 12/13/2024 1431 Procedure Location [facility] [address] [facility] [address] [phone] Referring Provider Kylie Danielle PA-C Procedure Provider Manan Pollock MD         Assessment/Plan   IMP:  Rasheed Antonio is a 84 y.o. male with past medical history of dementia, chronic anemia, GERD, CKD, and anxiety,who was admitted under the medicine service 12/26/2024 from his AL with acute MI.  The patient had previously endorsed feeling short of breath especially while ambulating, and was noted to be hypoxemic for EMS, initially requiring CPAP then BiPAP.  Significant findings in the ED included twelve-lead ECG with ischemic changes as well as increasing troponins, with most recent 10,700; the patient was also noted to be COVID-positive.  Cardiology was consulted and did recommend cardiac catheterization, however, the patient's family did not elect for invasive intervention, and instead preferred conservative treatment with heparin infusion.  The patient's family further clarified the patient has DNR CCA DNI CODE STATUS.  Pulmonology was consulted due to hypoxemia and pulmonary vascular congestion on imaging.  Urology was consulted for urinary catheter placement.  Palliative care was consulted to discuss goals of care and advance care planning.    Recommendations  -The patient is DNR DNI CODE STATUS which is quite appropriate given his acute MI with no cardiac catheterization  -The patient is feeling better with conservative management  -Looking forward to recommendations per the cardiology and pulmonology consulting specialists  -We will schedule MiraLAX to be given every 48 hours to address bowel regimen and avoid constipation  -Family would like input by specialists and to see how the patient responds to conservative treatment before making further decisions of care    12/27/2024-   The patient did not  appear to be in any obvious distress, and stated he is feeling much better than on arrival.  He denied having pain and stated his breathing is much easier.  The patient is now on 1 L nasal cannula.  He is awaiting further evaluation and input from the cardiology and pulmonology consults.  The patient did endorse feeling slightly constipated, and noted his last bowel movement was approximately 3 days ago.  The patient currently has MiraLAX as needed, however, we will schedule this every 48 hours to address bowel regimen so as to avoid constipation.  I did speak with the patient's daughter-in-law and HC POA, Shaniqua, over the phone this morning.  I introduced the practice of palliative care and the services it provides.  I then reviewed at length with patient/family the clinical diagnosis/medical problems that the patient presented with and the treatments provided so far. We discussed the implications of the current circumstances and option plans going forward.  Shaniqua stated the patient does live in an assisted living facility, but has been largely independent for all of his IADLs.  She stated the patient would prefer not to have invasive procedures, and so this is why they declined cardiac catheterization initially.  She stated they are looking forward to hearing recommendations by the cardiologist and the pulmonologist.  We did discuss the effects of myocardial infarct on the heart as well as the patient's potential ability for rehabilitation, discussing specifically that if the patient develops heart failure due to this incident, it may make it more difficult for him to work with PT/OT, causing more shortness of breath.  We did also discuss the patient's current COVID diagnosis.  Shaniqua stated the patient did have COVID recently prior to this admission, and stated the patient did develop acute anemia in early fall, requiring blood transfusions at that time.  She stated she does look forward to input from the  cardiologist and pulmonologist to know what the plan of care options may be moving forward.  I did discuss with Shaniqua my adjustment for bowel regimen, and she was agreeable.  I answered Shaniqua's questions and concerns to the best of my ability and offered emotional support.  Palliative care will continue to follow.      Thank you for allowing us to participate in the care of this gentleman.  Should you have any further questions or concerns regarding his care, please do not hesitate to contact us via Haiku/Epic Chat (Gissel Martin during weekdays work hours and Didier Chakraborty during weekdays after hours and over the weekend)    (This note was generated with voice recognition software and may contain errors including spelling, grammar, syntax and misrecognition of what was dictated, that are not fully corrected)      Patient/proxy preference for information  Prefers full information    Goals of Care  The patient is DNR DNI CODE STATUS.  Awaiting further input from cardiology and pulmonology for recommendations of care.    I spent 60 minutes in the professional and overall care of this patient.      Gissel Martin, APRN-CNP   EOMI; PERRL; no drainage or redness

## 2024-12-27 NOTE — PROGRESS NOTES
Occupational Therapy                      Therapy Communication Note    Patient Name: Rasheed Antonio  MRN: [id number]  Department: [facility] 8  Room: 812/812-A  Today's Date: 12/27/2024     Discipline: Occupational Therapy    OT Missed Visit: Yes     Missed Visit Reason: Patient placed on medical hold (Per RN, defer therapy eval this secondary to medical status.)    Missed Time: Attempt

## 2024-12-27 NOTE — CONSULTS
Consults  History Of Present Illness:    Rasheed Antonio is a 84 y.o. male presenting with SOB.    Patient is an 84-year-old male with a history of anemia, CKD, GERD, dementia, hypertension who presents with shortness of breath from his nursing facility.  Patient is found to have lactic acidosis, COVID-positive, renal dysfunction and anemia.  He denies any chest discomfort.  States that shortness of breath is improved.  He otherwise denies any palpitations, lightheadedness, syncope.  Does admit to lower extremity edema.    Twelve-lead ECG is unavailable to me.  Echocardiogram 10/16/2024 demonstrates an ejection fraction of 35 to 40% with moderate posterior MI, mild to moderate mitral digitation.  Lactate 3.3 increasing to 3.9.  Hemoglobin is 8.3.  Creatinine is 1.88 increasing to 2.07.  Troponin 464, 1453, 10,700.  Last Recorded Vitals:  Vitals:    12/27/24 1045 12/27/24 1204 12/27/24 1336 12/27/24 1602   BP: 104/64 106/63  109/69   BP Location:  Right arm  Right arm   Patient Position: Lying Sitting  Sitting   Pulse:  99  92   Resp: 20 22  20   Temp:  36.8 °C (98.2 °F)  36.8 °C (98.2 °F)   TempSrc:  Temporal  Temporal   SpO2: 94% 97% 98% 93%   Weight:       Height:           Last Labs:  CBC - 12/27/2024:  6:16 AM  20.6 8.2 303    28.2      CMP - 12/27/2024:  6:17 AM  9.0 7.3 13 --- 0.5   3.4 4.0 5 49      PTT - 12/26/2024: 11:35 PM  1.1   12.2 29     Troponin I, High Sensitivity   Date/Time Value Ref Range Status   12/27/2024 06:16 AM 10,700 (HH) 0 - 20 ng/L Final     Comment:     Previous result verified on 12/27/2024 0011 on specimen/case [id number] called with component [facility] for procedure Troponin I, High Sensitivity, Initial with value 464 ng/L.   12/27/2024 12:27 AM 1,453 (HH) 0 - 20 ng/L Final     Comment:     Previous result verified on 12/27/2024 0011 on specimen/case [id number] called with component [facility] for procedure Troponin I, High Sensitivity, Initial with value 464 ng/L.   12/26/2024 11:35   (HH) 0 - 20 ng/L Final     BNP   Date/Time Value Ref Range Status   12/26/2024 11:35  (H) 0 - 99 pg/mL Final   10/16/2024 05:38 AM 1,460 (H) 0 - 99 pg/mL Final      Last I/O:  I/O last 3 completed shifts:  In: 282.8 (4.7 mL/kg) [I.V.:282.8 (4.7 mL/kg)]  Out: - (0 mL/kg)   Weight: 60 kg     Past Cardiology Tests (Last 3 Years):  EKG:  ECG 12 lead 12/26/2024 (Wet Read)      ECG 12 lead 10/15/2024    Echo:  Transthoracic Echo (TTE) Complete 10/16/2024    Ejection Fractions:  EF   Date/Time Value Ref Range Status   10/16/2024 10:55 AM 38 %      Cath:  No results found for this or any previous visit from the past 1095 days.    Stress Test:  No results found for this or any previous visit from the past 1095 days.    Cardiac Imaging:  No results found for this or any previous visit from the past 1095 days.      Past Medical History:  He has a past medical history of Personal history of other diseases of the respiratory system (09/28/2016), Personal history of other endocrine, nutritional and metabolic disease (01/13/2017), Unspecified asthma with (acute) exacerbation ([facility]) (08/15/2018), and Vitamin D deficiency, unspecified (02/11/2022).    Past Surgical History:  He has no past surgical history on file.      Social History:  He reports that he has never smoked. He has never used smokeless tobacco. He reports that he does not currently use alcohol. He reports that he does not use drugs.    Family History:  No family history on file.     Allergies:  Grass pollen and Penicillins    Inpatient Medications:  Scheduled medications   Medication Dose Route Frequency    amLODIPine  10 mg oral Daily    aspirin  81 mg oral Daily    [START ON 12/28/2024] azithromycin  500 mg intravenous q24h    cefepime  1 g intravenous q12h    dexAMETHasone  6 mg intravenous Daily    dorzolamide  1 drop Both Eyes TID    guaiFENesin  600 mg oral BID    ipratropium-albuteroL  3 mL nebulization TID    latanoprost  1 drop Both Eyes  Nightly    oxygen   inhalation Continuous - Inhalation    [START ON 12/28/2024] pantoprazole  40 mg oral Daily before breakfast    pneumococcal polysaccharide  0.5 mL intramuscular During hospitalization    polyethylene glycol  17 g oral q48h    [START ON 12/28/2024] remdesivir  100 mg intravenous q24h     PRN medications   Medication    acetaminophen    albuterol    heparin    melatonin    ondansetron     Continuous Medications   Medication Dose Last Rate    heparin  0-4,000 Units/hr 700 Units/hr (12/27/24 1442)    sodium chloride 0.9%  75 mL/hr 75 mL/hr (12/27/24 0648)     Outpatient Medications:  Current Outpatient Medications   Medication Instructions    acetaminophen (TYLENOL) 650 mg, Every 8 hours PRN    albuterol 2.5 mg /3 mL (0.083 %) nebulizer solution 3 mL, nebulization, Every 4 hours PRN    amLODIPine (NORVASC) 10 mg, oral, Daily    dexAMETHasone (DECADRON) 6 mg, oral, Daily    dorzolamide (Trusopt) 2 % ophthalmic solution 1 drop, 3 times daily    latanoprost (Xalatan) 0.005 % ophthalmic solution 1 drop, Nightly    oxygen (O2) gas therapy 1 each, inhalation, Every 24 hours    pantoprazole (PROTONIX) 40 mg, oral, Daily before breakfast, Do not crush, chew, or split.    QUEtiapine (SEROQUEL) 25 mg, 2 times daily       Physical Exam:  Physical Exam  Vitals reviewed.   Constitutional:       Appearance: Normal appearance.   HENT:      Head: Normocephalic and atraumatic.      Mouth/Throat:      Mouth: Mucous membranes are moist.      Pharynx: Oropharynx is clear.   Eyes:      Extraocular Movements: Extraocular movements intact.      Conjunctiva/sclera: Conjunctivae normal.   Cardiovascular:      Rate and Rhythm: Normal rate and regular rhythm.      Pulses: Normal pulses.      Heart sounds: Normal heart sounds.   Pulmonary:      Effort: Pulmonary effort is normal.      Breath sounds: Normal breath sounds.   Abdominal:      General: Bowel sounds are normal.      Palpations: Abdomen is soft.   Musculoskeletal:          General: No swelling.      Cervical back: Neck supple.   Skin:     General: Skin is warm and dry.   Neurological:      General: No focal deficit present.      Mental Status: He is alert.   Psychiatric:         Mood and Affect: Mood normal.         Behavior: Behavior normal.           Assessment/Plan   12/27/2024  1.  Non-STEMI: Troponin elevation consistent with likely myocardial disease.  Uncertain if COVID-positive, lactic acidosis and renal failure is contributing.  He otherwise denies any chest discomfort.  Given his underlying medical problems, the patient's family would like to be conservative in his care.  He was started on a heparin drip.  Will go ahead and add aspirin therapy.  Can consider clopidogrel therapy as well.  2.  Old MI: Previous echocardiogram with an ejection fraction of 35 to 40% with moderate posterior myocardial infarction.  Continue aspirin therapy.  Will check a lipid panel consider statin therapy.    Peripheral IV 12/26/24 20 G Right Antecubital (Active)   Site Assessment Clean;Dry;Intact 12/27/24 0730   Dressing Type Transparent 12/27/24 0730   Line Status Infusing 12/27/24 0730   Dressing Status Clean;Dry;Occlusive 12/27/24 0730   Number of days: 1       Peripheral IV 12/27/24 20 G Left Antecubital (Active)   Site Assessment Clean;Dry;Intact 12/27/24 0730   Dressing Type Transparent 12/27/24 0730   Line Status Infusing 12/27/24 0730   Dressing Status Clean;Dry;Occlusive 12/27/24 0730   Number of days: 0       Urethral Catheter 16 Fr. (Active)   Site Assessment Clean;Skin intact 12/27/24 1607   Number of days: 0       Code Status:  DNR and No Intubation    Amari Diane MD

## 2024-12-28 ENCOUNTER — APPOINTMENT (OUTPATIENT)
Dept: RADIOLOGY | Facility: HOSPITAL | Age: 84
End: 2024-12-28
Payer: MEDICARE

## 2024-12-28 LAB
ANION GAP SERPL CALC-SCNC: 17 MMOL/L (ref 10–20)
BASOPHILS # BLD AUTO: 0.03 X10*3/UL (ref 0–0.1)
BASOPHILS NFR BLD AUTO: 0.1 %
BUN SERPL-MCNC: 42 MG/DL (ref 6–23)
CALCIUM SERPL-MCNC: 8.6 MG/DL (ref 8.6–10.3)
CHLORIDE SERPL-SCNC: 108 MMOL/L (ref 98–107)
CHOLEST SERPL-MCNC: 162 MG/DL (ref 0–199)
CHOLESTEROL/HDL RATIO: 2.2
CO2 SERPL-SCNC: 17 MMOL/L (ref 21–32)
CREAT SERPL-MCNC: 1.89 MG/DL (ref 0.5–1.3)
EGFRCR SERPLBLD CKD-EPI 2021: 34 ML/MIN/1.73M*2
EOSINOPHIL # BLD AUTO: 0 X10*3/UL (ref 0–0.4)
EOSINOPHIL NFR BLD AUTO: 0 %
ERYTHROCYTE [DISTWIDTH] IN BLOOD BY AUTOMATED COUNT: 17.5 % (ref 11.5–14.5)
GLUCOSE SERPL-MCNC: 155 MG/DL (ref 74–99)
HCT VFR BLD AUTO: 28.1 % (ref 41–52)
HDLC SERPL-MCNC: 73.5 MG/DL
HGB BLD-MCNC: 8.1 G/DL (ref 13.5–17.5)
HOLD SPECIMEN: NORMAL
HOLD SPECIMEN: NORMAL
IMM GRANULOCYTES # BLD AUTO: 0.26 X10*3/UL (ref 0–0.5)
IMM GRANULOCYTES NFR BLD AUTO: 1.1 % (ref 0–0.9)
LDLC SERPL CALC-MCNC: 80 MG/DL
LEGIONELLA AG UR QL: NEGATIVE
LYMPHOCYTES # BLD AUTO: 1.9 X10*3/UL (ref 0.8–3)
LYMPHOCYTES NFR BLD AUTO: 7.7 %
MCH RBC QN AUTO: 23.8 PG (ref 26–34)
MCHC RBC AUTO-ENTMCNC: 28.8 G/DL (ref 32–36)
MCV RBC AUTO: 82 FL (ref 80–100)
MONOCYTES # BLD AUTO: 1.25 X10*3/UL (ref 0.05–0.8)
MONOCYTES NFR BLD AUTO: 5.1 %
NEUTROPHILS # BLD AUTO: 21.19 X10*3/UL (ref 1.6–5.5)
NEUTROPHILS NFR BLD AUTO: 86 %
NON HDL CHOLESTEROL: 89 MG/DL (ref 0–149)
NRBC BLD-RTO: 0 /100 WBCS (ref 0–0)
PLATELET # BLD AUTO: 351 X10*3/UL (ref 150–450)
POTASSIUM SERPL-SCNC: 4 MMOL/L (ref 3.5–5.3)
PROCALCITONIN SERPL-MCNC: 3.58 NG/ML
RBC # BLD AUTO: 3.41 X10*6/UL (ref 4.5–5.9)
S PNEUM AG UR QL: NEGATIVE
SODIUM SERPL-SCNC: 138 MMOL/L (ref 136–145)
TRIGL SERPL-MCNC: 45 MG/DL (ref 0–149)
UFH PPP CHRO-ACNC: 0.1 IU/ML
UFH PPP CHRO-ACNC: 0.2 IU/ML
UFH PPP CHRO-ACNC: 0.3 IU/ML
UFH PPP CHRO-ACNC: 0.3 IU/ML
VLDL: 9 MG/DL (ref 0–40)
WBC # BLD AUTO: 24.6 X10*3/UL (ref 4.4–11.3)

## 2024-12-28 PROCEDURE — 2500000002 HC RX 250 W HCPCS SELF ADMINISTERED DRUGS (ALT 637 FOR MEDICARE OP, ALT 636 FOR OP/ED): Performed by: STUDENT IN AN ORGANIZED HEALTH CARE EDUCATION/TRAINING PROGRAM

## 2024-12-28 PROCEDURE — 80048 BASIC METABOLIC PNL TOTAL CA: CPT | Performed by: STUDENT IN AN ORGANIZED HEALTH CARE EDUCATION/TRAINING PROGRAM

## 2024-12-28 PROCEDURE — 85520 HEPARIN ASSAY: CPT | Performed by: STUDENT IN AN ORGANIZED HEALTH CARE EDUCATION/TRAINING PROGRAM

## 2024-12-28 PROCEDURE — 84145 PROCALCITONIN (PCT): CPT | Mod: PARLAB | Performed by: INTERNAL MEDICINE

## 2024-12-28 PROCEDURE — 2500000001 HC RX 250 WO HCPCS SELF ADMINISTERED DRUGS (ALT 637 FOR MEDICARE OP): Performed by: INTERNAL MEDICINE

## 2024-12-28 PROCEDURE — 85025 COMPLETE CBC W/AUTO DIFF WBC: CPT | Performed by: INTERNAL MEDICINE

## 2024-12-28 PROCEDURE — 92610 EVALUATE SWALLOWING FUNCTION: CPT | Mod: GN

## 2024-12-28 PROCEDURE — 2500000005 HC RX 250 GENERAL PHARMACY W/O HCPCS

## 2024-12-28 PROCEDURE — 2500000001 HC RX 250 WO HCPCS SELF ADMINISTERED DRUGS (ALT 637 FOR MEDICARE OP): Performed by: PHYSICIAN ASSISTANT

## 2024-12-28 PROCEDURE — 36415 COLL VENOUS BLD VENIPUNCTURE: CPT | Performed by: STUDENT IN AN ORGANIZED HEALTH CARE EDUCATION/TRAINING PROGRAM

## 2024-12-28 PROCEDURE — 2060000001 HC INTERMEDIATE ICU ROOM DAILY

## 2024-12-28 PROCEDURE — 2500000004 HC RX 250 GENERAL PHARMACY W/ HCPCS (ALT 636 FOR OP/ED)

## 2024-12-28 PROCEDURE — 80061 LIPID PANEL: CPT | Performed by: INTERNAL MEDICINE

## 2024-12-28 PROCEDURE — 99233 SBSQ HOSP IP/OBS HIGH 50: CPT | Performed by: INTERNAL MEDICINE

## 2024-12-28 PROCEDURE — 36415 COLL VENOUS BLD VENIPUNCTURE: CPT | Performed by: INTERNAL MEDICINE

## 2024-12-28 PROCEDURE — 71045 X-RAY EXAM CHEST 1 VIEW: CPT | Performed by: RADIOLOGY

## 2024-12-28 PROCEDURE — 71045 X-RAY EXAM CHEST 1 VIEW: CPT

## 2024-12-28 PROCEDURE — 94640 AIRWAY INHALATION TREATMENT: CPT

## 2024-12-28 PROCEDURE — 2500000001 HC RX 250 WO HCPCS SELF ADMINISTERED DRUGS (ALT 637 FOR MEDICARE OP)

## 2024-12-28 PROCEDURE — 99232 SBSQ HOSP IP/OBS MODERATE 35: CPT | Performed by: STUDENT IN AN ORGANIZED HEALTH CARE EDUCATION/TRAINING PROGRAM

## 2024-12-28 PROCEDURE — 94660 CPAP INITIATION&MGMT: CPT

## 2024-12-28 PROCEDURE — 85520 HEPARIN ASSAY: CPT

## 2024-12-28 PROCEDURE — 2500000004 HC RX 250 GENERAL PHARMACY W/ HCPCS (ALT 636 FOR OP/ED): Performed by: INTERNAL MEDICINE

## 2024-12-28 RX ORDER — FUROSEMIDE 10 MG/ML
40 INJECTION INTRAMUSCULAR; INTRAVENOUS ONCE
Status: COMPLETED | OUTPATIENT
Start: 2024-12-28 | End: 2024-12-28

## 2024-12-28 RX ORDER — AMLODIPINE BESYLATE 5 MG/1
5 TABLET ORAL DAILY
Status: DISCONTINUED | OUTPATIENT
Start: 2024-12-29 | End: 2025-01-02 | Stop reason: HOSPADM

## 2024-12-28 RX ORDER — FUROSEMIDE 10 MG/ML
40 INJECTION INTRAMUSCULAR; INTRAVENOUS DAILY
Status: DISCONTINUED | OUTPATIENT
Start: 2024-12-29 | End: 2025-01-02 | Stop reason: HOSPADM

## 2024-12-28 RX ORDER — ATORVASTATIN CALCIUM 20 MG/1
20 TABLET, FILM COATED ORAL NIGHTLY
Status: DISCONTINUED | OUTPATIENT
Start: 2024-12-28 | End: 2025-01-02 | Stop reason: HOSPADM

## 2024-12-28 RX ADMIN — AZITHROMYCIN MONOHYDRATE 500 MG: 500 INJECTION, POWDER, LYOPHILIZED, FOR SOLUTION INTRAVENOUS at 00:29

## 2024-12-28 RX ADMIN — IPRATROPIUM BROMIDE AND ALBUTEROL SULFATE 3 ML: .5; 3 SOLUTION RESPIRATORY (INHALATION) at 18:18

## 2024-12-28 RX ADMIN — GUAIFENESIN 600 MG: 600 TABLET, EXTENDED RELEASE ORAL at 08:23

## 2024-12-28 RX ADMIN — GUAIFENESIN 600 MG: 600 TABLET, EXTENDED RELEASE ORAL at 19:55

## 2024-12-28 RX ADMIN — Medication 28 PERCENT: at 18:18

## 2024-12-28 RX ADMIN — IPRATROPIUM BROMIDE AND ALBUTEROL SULFATE 3 ML: .5; 3 SOLUTION RESPIRATORY (INHALATION) at 13:55

## 2024-12-28 RX ADMIN — DORZOLAMIDE HCL 1 DROP: 20 SOLUTION/ DROPS OPHTHALMIC at 14:20

## 2024-12-28 RX ADMIN — FUROSEMIDE 40 MG: 10 INJECTION, SOLUTION INTRAMUSCULAR; INTRAVENOUS at 05:29

## 2024-12-28 RX ADMIN — HEPARIN SODIUM 1500 UNITS: 5000 INJECTION, SOLUTION INTRAVENOUS; SUBCUTANEOUS at 07:50

## 2024-12-28 RX ADMIN — ATORVASTATIN CALCIUM 20 MG: 20 TABLET, FILM COATED ORAL at 21:00

## 2024-12-28 RX ADMIN — REMDESIVIR 100 MG: 100 INJECTION, POWDER, LYOPHILIZED, FOR SOLUTION INTRAVENOUS at 02:31

## 2024-12-28 RX ADMIN — CEFEPIME 1 G: 1 INJECTION, POWDER, FOR SOLUTION INTRAMUSCULAR; INTRAVENOUS at 12:07

## 2024-12-28 RX ADMIN — DORZOLAMIDE HCL 1 DROP: 20 SOLUTION/ DROPS OPHTHALMIC at 19:56

## 2024-12-28 RX ADMIN — DORZOLAMIDE HCL 1 DROP: 20 SOLUTION/ DROPS OPHTHALMIC at 08:22

## 2024-12-28 RX ADMIN — Medication 30 PERCENT: at 21:32

## 2024-12-28 RX ADMIN — HEPARIN SODIUM 1500 UNITS: 5000 INJECTION, SOLUTION INTRAVENOUS; SUBCUTANEOUS at 17:45

## 2024-12-28 RX ADMIN — IPRATROPIUM BROMIDE AND ALBUTEROL SULFATE 3 ML: .5; 3 SOLUTION RESPIRATORY (INHALATION) at 06:48

## 2024-12-28 RX ADMIN — LATANOPROST 1 DROP: 50 SOLUTION/ DROPS OPHTHALMIC at 20:10

## 2024-12-28 RX ADMIN — PANTOPRAZOLE SODIUM 40 MG: 40 TABLET, DELAYED RELEASE ORAL at 06:26

## 2024-12-28 RX ADMIN — DEXAMETHASONE SODIUM PHOSPHATE 6 MG: 10 INJECTION INTRAMUSCULAR; INTRAVENOUS at 08:22

## 2024-12-28 RX ADMIN — Medication 5 MG: at 20:10

## 2024-12-28 RX ADMIN — ASPIRIN 81 MG CHEWABLE TABLET 81 MG: 81 TABLET CHEWABLE at 08:21

## 2024-12-28 RX ADMIN — HEPARIN SODIUM 800 UNITS/HR: 10000 INJECTION, SOLUTION INTRAVENOUS at 08:23

## 2024-12-28 RX ADMIN — CEFEPIME 1 G: 1 INJECTION, POWDER, FOR SOLUTION INTRAMUSCULAR; INTRAVENOUS at 23:35

## 2024-12-28 RX ADMIN — Medication 2 L/MIN: at 08:06

## 2024-12-28 ASSESSMENT — COGNITIVE AND FUNCTIONAL STATUS - GENERAL
CLIMB 3 TO 5 STEPS WITH RAILING: TOTAL
TURNING FROM BACK TO SIDE WHILE IN FLAT BAD: A LITTLE
MOVING FROM LYING ON BACK TO SITTING ON SIDE OF FLAT BED WITH BEDRAILS: A LITTLE
DRESSING REGULAR UPPER BODY CLOTHING: A LOT
DRESSING REGULAR LOWER BODY CLOTHING: A LITTLE
PERSONAL GROOMING: A LITTLE
MOVING TO AND FROM BED TO CHAIR: A LITTLE
PERSONAL GROOMING: A LITTLE
STANDING UP FROM CHAIR USING ARMS: A LITTLE
DRESSING REGULAR UPPER BODY CLOTHING: A LITTLE
STANDING UP FROM CHAIR USING ARMS: A LOT
MOBILITY SCORE: 15
WALKING IN HOSPITAL ROOM: A LOT
DAILY ACTIVITIY SCORE: 17
DAILY ACTIVITIY SCORE: 19
CLIMB 3 TO 5 STEPS WITH RAILING: TOTAL
TOILETING: A LITTLE
WALKING IN HOSPITAL ROOM: A LOT
HELP NEEDED FOR BATHING: A LITTLE
MOBILITY SCORE: 15
MOVING TO AND FROM BED TO CHAIR: A LOT
HELP NEEDED FOR BATHING: A LOT
DRESSING REGULAR LOWER BODY CLOTHING: A LITTLE
TOILETING: A LITTLE

## 2024-12-28 ASSESSMENT — PAIN SCALES - GENERAL
PAINLEVEL_OUTOF10: 0 - NO PAIN

## 2024-12-28 ASSESSMENT — PAIN - FUNCTIONAL ASSESSMENT
PAIN_FUNCTIONAL_ASSESSMENT: 0-10
PAIN_FUNCTIONAL_ASSESSMENT: 0-10

## 2024-12-28 NOTE — PROGRESS NOTES
"Rasheed Antonio is a 84 y.o. male on day 1 of admission presenting with Acute myocardial infarction, unspecified MI type, unspecified artery (Multi).    Subjective   Follow-up urinary retention and difficult Dan catheter placement  Dan's to continuous drainage with urine clear.       Objective     Physical Exam    Last Recorded Vitals  Blood pressure 109/72, pulse 86, temperature 35.9 °C (96.6 °F), temperature source Temporal, resp. rate 16, height 1.626 m (5' 4\"), weight 60 kg (132 lb 4.4 oz), SpO2 95%.  Intake/Output last 3 Shifts:  I/O last 3 completed shifts:  In: 2243.7 (37.4 mL/kg) [I.V.:1793.7 (29.9 mL/kg); IV Piggyback:450]  Out: 3800 (63.3 mL/kg) [Urine:3800 (1.8 mL/kg/hr)]  Weight: 60 kg     Relevant Results  Scheduled medications  amLODIPine, 10 mg, oral, Daily  aspirin, 81 mg, oral, Daily  azithromycin, 500 mg, intravenous, q24h  cefepime, 1 g, intravenous, q12h  dexAMETHasone, 6 mg, intravenous, Daily  dorzolamide, 1 drop, Both Eyes, TID  guaiFENesin, 600 mg, oral, BID  ipratropium-albuteroL, 3 mL, nebulization, TID  latanoprost, 1 drop, Both Eyes, Nightly  oxygen, , inhalation, Continuous - Inhalation  pantoprazole, 40 mg, oral, Daily before breakfast  pneumococcal polysaccharide, 0.5 mL, intramuscular, During hospitalization  polyethylene glycol, 17 g, oral, q48h  remdesivir, 100 mg, intravenous, q24h      Continuous medications  heparin, 0-4,000 Units/hr, Last Rate: 8 Units/hr (12/28/24 0823)      PRN medications  PRN medications: acetaminophen, albuterol, heparin, melatonin, ondansetron    Results for orders placed or performed during the hospital encounter of 12/26/24 (from the past 24 hours)   Heparin Assay   Result Value Ref Range    Heparin Unfractionated 0.3 See Comment Below for Therapeutic Ranges IU/mL   CBC and Auto Differential   Result Value Ref Range    WBC 24.6 (H) 4.4 - 11.3 x10*3/uL    nRBC 0.0 0.0 - 0.0 /100 WBCs    RBC 3.41 (L) 4.50 - 5.90 x10*6/uL    Hemoglobin 8.1 (L) 13.5 - " 17.5 g/dL    Hematocrit 28.1 (L) 41.0 - 52.0 %    MCV 82 80 - 100 fL    MCH 23.8 (L) 26.0 - 34.0 pg    MCHC 28.8 (L) 32.0 - 36.0 g/dL    RDW 17.5 (H) 11.5 - 14.5 %    Platelets 351 150 - 450 x10*3/uL    Neutrophils % 86.0 40.0 - 80.0 %    Immature Granulocytes %, Automated 1.1 (H) 0.0 - 0.9 %    Lymphocytes % 7.7 13.0 - 44.0 %    Monocytes % 5.1 2.0 - 10.0 %    Eosinophils % 0.0 0.0 - 6.0 %    Basophils % 0.1 0.0 - 2.0 %    Neutrophils Absolute 21.19 (H) 1.60 - 5.50 x10*3/uL    Immature Granulocytes Absolute, Automated 0.26 0.00 - 0.50 x10*3/uL    Lymphocytes Absolute 1.90 0.80 - 3.00 x10*3/uL    Monocytes Absolute 1.25 (H) 0.05 - 0.80 x10*3/uL    Eosinophils Absolute 0.00 0.00 - 0.40 x10*3/uL    Basophils Absolute 0.03 0.00 - 0.10 x10*3/uL   Basic Metabolic Panel   Result Value Ref Range    Glucose 155 (H) 74 - 99 mg/dL    Sodium 138 136 - 145 mmol/L    Potassium 4.0 3.5 - 5.3 mmol/L    Chloride 108 (H) 98 - 107 mmol/L    Bicarbonate 17 (L) 21 - 32 mmol/L    Anion Gap 17 10 - 20 mmol/L    Urea Nitrogen 42 (H) 6 - 23 mg/dL    Creatinine 1.89 (H) 0.50 - 1.30 mg/dL    eGFR 34 (L) >60 mL/min/1.73m*2    Calcium 8.6 8.6 - 10.3 mg/dL   Lipid Panel   Result Value Ref Range    Cholesterol 162 0 - 199 mg/dL    HDL-Cholesterol 73.5 mg/dL    Cholesterol/HDL Ratio 2.2     LDL Calculated 80 <=99 mg/dL    VLDL 9 0 - 40 mg/dL    Triglycerides 45 0 - 149 mg/dL    Non HDL Cholesterol 89 0 - 149 mg/dL   Heparin Assay, UFH   Result Value Ref Range    Heparin Unfractionated 0.1 See Comment Below for Therapeutic Ranges IU/mL       XR chest 1 view    Result Date: 12/28/2024  Interpreted By:  Courtney Bartholomew, STUDY: XR CHEST 1 VIEW;  12/28/2024 6:10 am   INDICATION: Signs/Symptoms:worsening shortness of breath.   COMPARISON: 12/27/2024   ACCESSION NUMBER(S): [id number]   ORDERING CLINICIAN: GELA FLOREZ   FINDINGS: The patient is rotated. The heart size may be normal. There are atherosclerotic changes of the aorta.   There is  bilateral parenchymal infiltration.   There are degenerative changes of the spine.   COMPARISON OF FINDING: Lungs appear worse.       Worsening parenchymal infiltration.   MACRO: none   Signed by: Courtney Bartholomew 12/28/2024 7:11 AM Dictation workstation:   [id number]    XR chest 1 view    Result Date: 12/27/2024  Interpreted By:  Ravindra Carty, STUDY: XR CHEST 1 VIEW;  12/27/2024 11:58 am   INDICATION: Signs/Symptoms:covid induced shortness of breath.   COMPARISON: Chest x-ray from 12/26/2024. CT scan chest from 10/15/2024..   ACCESSION NUMBER(S): [id number]   ORDERING CLINICIAN: MARLENY SALVADOR   TECHNIQUE: Single AP portable view of the chest was obtained.   FINDINGS: MEDIASTINUM/ LUNGS/ KINGSLEY: The images degraded by patient motion. There is persistent pneumonia at the medial right lung base, grossly stable. There may be prominent vessels or additional infiltrate at the medial left lung base, similar to the previous exam. Cardiomegaly. No blunting of the costophrenic sulci. No pneumothorax. No tracheal deviation. No abnormal hilar fullness or gross mass on either side.   BONES: No lytic or blastic destructive bone lesion.   UPPER ABDOMEN: Grossly intact.       Imaged degraded by patient motion.   Cardiomegaly.   Grossly stable right basilar pneumonia.   MACRO: None   Signed by: Ravindra Carty 12/27/2024 3:58 PM Dictation workstation:   [id number]    XR chest 1 view    Result Date: 12/27/2024  STUDY: Chest Radiograph;  12/26/2024 11:54 PM INDICATION: Shortness of breath. COMPARISON: 10/16/2024 XR Chest ACCESSION NUMBER(S): [id number] ORDERING CLINICIAN: ZAINA MCDANIEL TECHNIQUE:  Frontal chest was obtained at 23:51 hours. FINDINGS: CARDIOMEDIASTINAL SILHOUETTE: Cardiomediastinal silhouette is stable in size and configuration.  LUNGS: Right basilar airspace disease/consolidation is demonstrated.  Left lung is grossly clear.  No overt edema.  No large pleural effusion. No pneumothorax.  ABDOMEN: No remarkable upper  abdominal findings.  BONES: No acute osseous changes.    Right basilar airspace disease/consolidation most compatible with pneumonia. Signed by Alberto Yanez MD    Esophagogastroduodenoscopy (EGD)    Result Date: 12/13/2024  Table formatting from the original result was not included. Impression 3 cm hiatal hernia The upper third of the esophagus and middle third of the esophagus appeared normal. Two tongues of Wylie-colored mucosa (~1 cm ) suspicion for Amanda's esophagus; performed cold forceps biopsy The cardia, fundus of the stomach, body of the stomach, incisura, antrum, prepyloric region and pylorus appeared normal. The duodenal bulb, 1st part of the duodenum and 2nd part of the duodenum appeared normal. Findings 3 cm hiatal hernia without Javier lesions present - GE junction 35 cm from the incisors, diaphragmatic impression 38 cm from the incisors, confirmed by retroflexion. Hill grade I hiatal hernia The upper third of the esophagus and middle third of the esophagus appeared normal. Amanda's esophagus observed with 2 tongues and no associated lesion without using a distal attachment cap. The Z-line was 35 cm from the incisors; performed cold forceps biopsy The cardia, fundus of the stomach, body of the stomach, incisura, antrum, prepyloric region and pylorus appeared normal. The duodenal bulb, 1st part of the duodenum and 2nd part of the duodenum appeared normal. Recommendation Await pathology results Follow up with PCP Protonix 40 mg once daily  Indication Esophagitis Staff Staff Role Manan Pollock MD Proceduralist Medications See Anesthesia Record. Preprocedure A history and physical has been performed, and patient medication allergies have been reviewed. The patient's tolerance of previous anesthesia has been reviewed. The risks and benefits of the procedure and the sedation options and risks were discussed with the patient. All questions were answered and informed consent obtained. Details of the  Procedure The patient underwent monitored anesthesia care, which was administered by an anesthesia professional. The patient's blood pressure, ECG, ETCO2, heart rate, level of consciousness, oxygen and respirations were monitored throughout the procedure. The scope was introduced through the mouth and advanced to the second part of the duodenum. Retroflexion was performed in the cardia. The patient experienced no blood loss. The procedure was not difficult. The patient tolerated the procedure well. There were no apparent adverse events. Events Procedure Events Event Event Time ENDO SCOPE IN TIME 12/13/2024  2:28 PM ENDO SCOPE OUT TIME 12/13/2024  2:32 PM Specimens ID Type Source Tests Collected by Time 1 : COLD BX Tissue ESOPHAGOGASTRIC JUNCTION BIOPSY SURGICAL PATHOLOGY EXAM Manan Pollock MD 12/13/2024 1431 Procedure Location [facility] [address] [place] [id number] [phone] Referring Provider Kylie Danielle PA-C Procedure Provider Manan Pollock MD            This patient currently has cardiac telemetry ordered; if you would like to modify or discontinue the telemetry order, click here to go to the orders activity to modify/discontinue the order.                 Assessment/Plan   Assessment & Plan  Acute myocardial infarction, unspecified MI type, unspecified artery (Multi)    Follow-up urinary retention and difficult Dan catheter.  - Dan's to continuous drainage urine clear  Maintain the Dan catheter with a trial of void in 1 week.       I spent 20 minutes in the professional and overall care of this patient.      Familia Almanza PA-C

## 2024-12-28 NOTE — PROGRESS NOTES
Speech-Language Pathology    SLP Adult Inpatient Speech-Language Pathology Clinical Swallow Evaluation    Patient Name: Rasheed Antonio  MRN: [id number]  Today's Date: 12/28/2024   Time Calculation  Start Time: 1041  Stop Time: 1054  Time Calculation (min): 13 min     Current Problem:   1. Acute myocardial infarction, unspecified MI type, unspecified artery (Multi)        2. Acute pneumonia        3. Leukocytosis, unspecified type        4. COVID-19          Recommendations:  Continue an Easy to Chew Diet with Thin Liquids  Avoid mixed consistencies due to current increased WOB  Pills whole in puree carrier    Dysphagia Goals (established 12/28):   Patient will tolerate recommended diet without observed clinical signs of aspiration  Pt/family education     Assessment:  Assessment Results: Patient presents with mild oral dysphagia, however pharyngeal phase of swallow appears intact. Pt is restless with language of confusion produced through out evaluation. Full dentures currently in place. Rhonchi breathing produced during verbalizations and during repositioning self in bed (prior to presenting p.o. trials). Despite increased WOB, pt appears to coordinate the swallow-breathe sequence without incident. Mastication of hard solids is slower due to the increased WOB, with pt verbalizing preference of mildly softer foods at baseline. Trace oral residuals scattered through out oral cavity fully clear with liquid wash. Swallow onset appears prompt with adequate hyo-laryngeal elevation palpated. Pt intermittently completed a spontaneous double swallow, but denies globus sensation in pharynx. No overt s/s of aspiration, vocal quality remained clear, and respirations remained stable when breaks given in between p.o. trials. Pt appears safe to remain on current prescribed diet given strict adherence to swallow controls.     Prognosis: Good  Medical Staff Made Aware: Yes  Barriers: Comorbidities  (Confusion)    Plan:  Inpatient/Swing Bed or Outpatient: Inpatient  Treatment/Interventions: Assess diet tolerance, Bolus trials, Patient/family education  SLP Plan: Skilled SLP  SLP Frequency: One time visit  Duration: 1 week  SLP Discharge Recommendations:  (will continue to assess while in-house)  Discussed POC: Patient, Nursing (Sully)  Discussed Risks/Benefits: Yes, Patient, Nursing  Patient/Caregiver Agreeable: Yes    Subjective   Current Problem:  Clinical swallow evaluation ordered to rule out aspiration. NSG (Kimanin) denies dysphagia, and pt reportedly swallowed AM medications whole with water without incident.    General Visit Information:  Patient Class: Inpatient  Living Environment:  (assisted living)  Referred By: Dr. Weinberg  Past Medical History Relevant to Rehab: shortness of breath from assisted living [facility] ([place], [place]).  Patient has a past medical history of CKD, anemia, GERD, dementia, anxiety, and HTN.  Patient Seen During This Visit: Yes  Prior to Session Communication: Bedside nurse (Sully)  Reviewed Procedures and Risks: Yes  [employer] Diet: Regular/Thin Liquids  Current Diet : Easy to Chew Diet/Thin Liquids  Dysphagia Diagnosis: Mild oral stage dysphagia (Pharyngeal phase of swallow appears intact despite compromised respiratory status.)    Vital Signs: Elevated WBC (24.6). 95% FIO2 on 2 liters nasal cannula. Afebrile. + COVID.     CxR 12/28: Lungs appear worse. Worsening parenchymal infiltration.      Objective     Baseline Assessment:  Respiratory Status: Oxygen via nasal cannula (2 liters nasal cannula)  Patient Positioning: Upright in Bed  Baseline Vocal Quality:  (Clear vocal quality/rhonchi when speaking or repositioning in bed)    Pain:  Pain Assessment  Pain Assessment: 0-10  0-10 (Numeric) Pain Score: 0 - No pain    Oral/Motor Assessment:  Oral Hygiene: Adequaate  Dentition: Complete Dentures  Oral Motor: Within Functional Limits    Consistencies Trialed:  Consistencies Trialed:  Yes  Consistencies Trialed: Thin (IDDSI Level 0) - Straw, Thin (IDDSI Level 0) - Cup, Pureed/extremely thick (IDDSI Level 4), Regular (IDDSI Level 7), Nectar thick/mildly thick (IDDSI Level 2) - Cup    Clinical Observations:  Patient Positioning: Upright in Bed  Was The 3 oz Swallow Protocol Completed: No (Pt's respiratory system could not support consecutive sips of thin liquid)    Inpatient:  Education Comments  Results/recommendations (including diet level, aspiration precautions, swallow controls, POC) discussed with pt and NSG (Sully).

## 2024-12-28 NOTE — SIGNIFICANT EVENT
RN called and stated patient was out of bed, off BiPAP, covered in some blood. They got patient in bed and placed back on BiPAP. RN could hear adventitious lung sounds without a stethoscope. Patient has been getting IV fluids. This NP held IV fluids, ordered stat chest x-ray, and lasix 40mg IVP x1. RN stated patient is up to 95% on BiPAP at this time. Informed RN to call a rapid response if patient condition worsens. Attending to follow.

## 2024-12-28 NOTE — PROGRESS NOTES
Subjective Data:  Denies any chest discomfort.  Does admit to shortness of breath.    Overnight Events:    Had respiratory distress this morning.  Was getting IV fluids.  IV fluids were stopped and he was given 40 mg of Lasix IV.  Chest x-ray demonstrates worsening infiltrates/edema.     Objective Data:  Last Recorded Vitals:  Vitals:    12/28/24 0325 12/28/24 0347 12/28/24 0648 12/28/24 0806   BP:  109/72  106/55   BP Location:  Right arm  Left arm   Patient Position:  Lying  Lying   Pulse:  86  97   Resp: 21 16 18   Temp:  35.9 °C (96.6 °F)  35.7 °C (96.3 °F)   TempSrc:  Temporal  Temporal   SpO2:  98% 99% 95%   Weight:       Height:           Last Labs:  CBC - 12/28/2024:  5:32 AM  24.6 8.1 351    28.1      CMP - 12/28/2024:  5:32 AM  8.6 7.3 13 --- 0.5   3.4 4.0 5 49      PTT - 12/26/2024: 11:35 PM  1.1   12.2 29     TROPHS   Date/Time Value Ref Range Status   12/27/2024 06:16 AM 10,700 0 - 20 ng/L Final     Comment:     Previous result verified on 12/27/2024 0011 on specimen/case [id number] [facility] Troponin I, High Sensitivity, Initial with value 464 ng/L.   12/27/2024 12:27 AM 1,453 0 - 20 ng/L Final     Comment:     Previous result verified on 12/27/2024 0011 on specimen/case [id number] called with component [facility] for procedure Troponin I, High Sensitivity, Initial with value 464 ng/L.   12/26/2024 11:35  0 - 20 ng/L Final     BNP   Date/Time Value Ref Range Status   12/26/2024 11:35  0 - 99 pg/mL Final   10/16/2024 05:38 AM 1,460 0 - 99 pg/mL Final     LDLCALC   Date/Time Value Ref Range Status   12/28/2024 05:32 AM 80 <=99 mg/dL Final     Comment:                                 Near   Borderline      AGE      Desirable  Optimal    High     High     Very High     0-19 Y     0 - 109     ---    110-129   >/= 130     ----    20-24 Y     0 - 119     ---    120-159   >/= 160     ----      >24 Y     0 -  99   100-129  130-159   160-189     >/=190       VLDL    Date/Time Value Ref Range Status   12/28/2024 05:32 AM 9 0 - 40 mg/dL Final      Last I/O:  I/O last 3 completed shifts:  In: 2243.7 (37.4 mL/kg) [I.V.:1793.7 (29.9 mL/kg); IV Piggyback:450]  Out: 3800 (63.3 mL/kg) [Urine:3800 (1.8 mL/kg/hr)]  Weight: 60 kg     Past Cardiology Tests (Last 3 Years):  EKG:  ECG 12 lead 12/26/2024 (Wet Read)      ECG 12 lead 10/15/2024    Echo:  Transthoracic Echo (TTE) Complete 10/16/2024  CONCLUSIONS:   1. The left ventricular systolic function is moderately decreased, with a visually estimated ejection fraction of 35-40%.   2. There is a moderate posterior wall myocardial infarction.   3. Mild to moderate mitral valve regurgitation.   4. Moderate aortic valve stenosis.   5. Right ventricular systolic pressure is within normal limits.    Ejection Fractions:  EF   Date/Time Value Ref Range Status   10/16/2024 10:55 AM 38 %      Cath:  No results found for this or any previous visit from the past 1095 days.    Stress Test:  No results found for this or any previous visit from the past 1095 days.    Cardiac Imaging:  No results found for this or any previous visit from the past 1095 days.      Inpatient Medications:  Scheduled medications   Medication Dose Route Frequency    amLODIPine  10 mg oral Daily    aspirin  81 mg oral Daily    azithromycin  500 mg intravenous q24h    cefepime  1 g intravenous q12h    dexAMETHasone  6 mg intravenous Daily    dorzolamide  1 drop Both Eyes TID    [START ON 12/29/2024] furosemide  40 mg intravenous Daily    guaiFENesin  600 mg oral BID    ipratropium-albuteroL  3 mL nebulization TID    latanoprost  1 drop Both Eyes Nightly    oxygen   inhalation Continuous - Inhalation    pantoprazole  40 mg oral Daily before breakfast    pneumococcal polysaccharide  0.5 mL intramuscular During hospitalization    polyethylene glycol  17 g oral q48h    remdesivir  100 mg intravenous q24h     PRN medications   Medication    acetaminophen    albuterol    heparin     melatonin    ondansetron     Continuous Medications   Medication Dose Last Rate    heparin  0-4,000 Units/hr 8 Units/hr (12/28/24 0941)       Physical Exam:  Physical Exam  Vitals reviewed.   Constitutional:       Appearance: Normal appearance.   HENT:      Head: Normocephalic and atraumatic.      Mouth/Throat:      Mouth: Mucous membranes are moist.      Pharynx: Oropharynx is clear.   Eyes:      Extraocular Movements: Extraocular movements intact.      Conjunctiva/sclera: Conjunctivae normal.   Cardiovascular:      Rate and Rhythm: Normal rate and regular rhythm.      Pulses: Normal pulses.      Heart sounds: Normal heart sounds.   Pulmonary:      Effort: Pulmonary effort is normal.      Breath sounds: Examination of the right-middle field reveals rhonchi. Examination of the left-middle field reveals rhonchi. Examination of the right-lower field reveals rhonchi. Examination of the left-lower field reveals rhonchi. Rhonchi present.   Abdominal:      General: Bowel sounds are normal.      Palpations: Abdomen is soft.   Musculoskeletal:         General: Swelling present.      Cervical back: Neck supple.   Skin:     General: Skin is warm and dry.   Neurological:      General: No focal deficit present.      Mental Status: He is alert.   Psychiatric:         Mood and Affect: Mood normal.         Behavior: Behavior normal.           Assessment/Plan   12/27/2024  1.  Non-STEMI: Troponin elevation consistent with likely myocardial disease.  Uncertain if COVID-positive, lactic acidosis and renal failure is contributing.  He otherwise denies any chest discomfort.  Given his underlying medical problems, the patient's family would like to be conservative in his care.  He was started on a heparin drip.  Will go ahead and add aspirin therapy.  Can consider clopidogrel therapy as well.  2.  Old MI: Previous echocardiogram with an ejection fraction of 35 to 40% with moderate posterior myocardial infarction.  Continue aspirin  therapy.  Will check a lipid panel consider statin therapy.    12/28/2024  1.  NSTEMI: Continue heparin for an additional day.  Continue aspirin therapy.  Will initiate clopidogrel tomorrow.  Troponin elevation consistent with likely myocardial disease.  Uncertain if COVID-positive, lactic acidosis and renal failure is contributing.  He otherwise denies any chest discomfort.  Given his underlying medical problems, the patient's family would like to be conservative in his care.  2.  Old MI: Previous echocardiogram with an ejection fraction of 35 to 40%.  Moderate posterior myocardial infarction.  Continue aspirin therapy.  Will start statin therapy.  3.  HFrEF: Received IV fluids for hypotension with worsening respiratory status.  Patient is also COVID-positive.  IV fluids were stopped and received a dose of IV Lasix.  X-ray was worse.  Will put him on maintenance dose of IV Lasix assuming his renal function tolerates this.    Peripheral IV 12/27/24 20 G Left Antecubital (Active)   Site Assessment Clean;Dry;Intact 12/28/24 0840   Dressing Status Clean;Dry 12/28/24 0840   Number of days: 1       Urethral Catheter 16 Fr. (Active)   Site Assessment Clean;Skin intact 12/28/24 0807   Number of days: 1       Code Status:  DNR and No Intubation    Amari Diane MD

## 2024-12-28 NOTE — CARE PLAN
The patient's goals for the shift include      The clinical goals for the shift include patient SpO2 stats will remain above 92%

## 2024-12-28 NOTE — PROGRESS NOTES
"Rasheed Antonio is a 84 y.o. male on day 1 of admission presenting with Acute myocardial infarction, unspecified MI type, unspecified artery (Multi).    Subjective   Increase work of breathing, labs ok, BP sfot    Objective     Physical Exam  Constitutional:       Appearance: Normal appearance.   HENT:      Head: Normocephalic and atraumatic.      Right Ear: Tympanic membrane and ear canal normal.      Left Ear: Tympanic membrane and ear canal normal.      Mouth/Throat:      Mouth: Mucous membranes are moist.      Pharynx: Oropharynx is clear.   Eyes:      Extraocular Movements: Extraocular movements intact.      Conjunctiva/sclera: Conjunctivae normal.      Pupils: Pupils are equal, round, and reactive to light.   Cardiovascular:      Rate and Rhythm: Normal rate and regular rhythm.      Pulses: Normal pulses.      Heart sounds: Normal heart sounds.   Pulmonary:      Effort: Pulmonary effort is normal.      Breath sounds: Normal breath sounds.   Abdominal:      General: Abdomen is flat. Bowel sounds are normal.      Palpations: Abdomen is soft.   Musculoskeletal:         General: Normal range of motion.      Cervical back: Normal range of motion and neck supple.   Skin:     General: Skin is warm and dry.      Capillary Refill: Capillary refill takes 2 to 3 seconds.   Neurological:      General: No focal deficit present.      Mental Status: He is alert and oriented to person, place, and time. Mental status is at baseline.   Psychiatric:         Mood and Affect: Mood normal.         Behavior: Behavior normal.         Thought Content: Thought content normal.         Judgment: Judgment normal.         Last Recorded Vitals  Blood pressure 100/68, pulse 97, temperature 36.2 °C (97.2 °F), resp. rate 18, height 1.626 m (5' 4\"), weight 60 kg (132 lb 4.4 oz), SpO2 99%.  Intake/Output last 3 Shifts:  I/O last 3 completed shifts:  In: 2243.7 (37.4 mL/kg) [I.V.:1793.7 (29.9 mL/kg); IV Piggyback:450]  Out: 3800 (63.3 mL/kg) " [Urine:3800 (1.8 mL/kg/hr)]  Weight: 60 kg     Relevant Results            This patient currently has cardiac telemetry ordered; if you would like to modify or discontinue the telemetry order, click here to go to the orders activity to modify/discontinue the order.                 Assessment/Plan   Assessment & Plan  Acute myocardial infarction, unspecified MI type, unspecified artery (Multi)    Patient arrived to ED today after being picked up from assisted living facility for shortness of breath and oxygen saturations at 71% with patient's baseline 2 L nasal cannula increased to 5 L.  Patient placed on CPAP and route by EMS.  Patient was also found to be hypertensive 190/130, EMS gave 1 nitro and patient became hypotensive but patient reports feeling better.  In ED EKG was obtained which showed ischemic changes and possible MI, troponins are reflecting that with initial troponin 464 and second troponin 1453.  Per ED physician spoke with daughter-in-law who is power of  and wants patient to remain DNR CC arrest DNI.  Family did not want cardiac catheterization so patient is being treated with a continuous heparin drip.  Cardiology consulted appreciate recs.  Patient also found to be COVID-positive, will treat with COVID protocol.  Chest x-ray pending.  Pulmonology consult placed appreciate recs.  Patient also found to have electrolyte imbalance, electrolytes repleted, will trend with morning labs.     Patient admitted to Dr. Nick Barker for further medical management.      # Myocardial infarction?  # EKG changes  # Hypertensive PTA then Hypotensive  # Pulmonary Vascular Congestion  # Elevated BNP  # Elevated Troponin   # Hypokalemia  # Hypomagnesemia  -Continuous Heparin Drip, with heparin assays  -Cardiology consult  -Trend troponin x 1 more, initial Trope 464, 2nd trop 1453  -Replete magnesium and potassium  -N.p.o. diet while on BiPAP, may advance diet when appropriate to cardiac  -admitted to inpatient  with telemetry    -q4 vitals   -morning labs, trend electrolytes, troponin, repeat VBG     # Sepsis   # COVID-positive  # Leukocytosis  # Lactic acidosis  # Abnormal VBG  -DuoNeb scheduled, albuterol as needed  -0.9 normal saline at 75 mL/hour until BiPAP is removed  -Initiate remdesivir  -Continue BiPAP  -Dexamethasone  -Antibiotics initiated in ED will continue cefepime, azithromycin  -Blood cultures in process  -Pulmonology consult  -Droplet plus precautions  -Strep pneumo/Legionella urine ordered/UA  -Respiratory culture ordered     Chronic Conditions   # CKD  # Anemia  # GERD  # Dementia  # Anxiety     Continue home medications as ordered when nursing completes home med rec.    PT/OT/SW   DNR cc Arrest-DNI     #DVT Prophylaxis   Scd's as tolerated   DVT Prophylaxis on continuous heparin        12/27: doing better with conservative management, palliative consutled, heparin drip, echo pending    12/28: overall doing ok, cxr shows increased infiltrate,s lasix givne to see if it improves breathing. Progonsis is guarded       I spent 35 minutes in the professional and overall care of this patient.      Nick Barker, DO

## 2024-12-28 NOTE — PROGRESS NOTES
Today's date:12/28/24  Provider's name:Javi Weinberg MD  Patient medical record number:[id number]    Pulmonary critical care note:    Subjective:      Interval history was reviewed, discussed with patient RN and respiratory therapist  Daily progress:  12/28/2024: Patient had episode of confusion which she remove the BiPAP is currently on oxygen via heated high flow versus nasal cannula to target saturation of 89 to 94%    History of Present Illness:      84 y.o. male  on day  1 of admission presenting with Acute myocardial infarction, unspecified MI type, unspecified artery (Multi).  From a nursing home with known history of CKD/anemia/GERD/dementia/anxiety/hypertension who presented to the ED with progressive hypertension blood systolic blood pressure of 190, increased oxygen demand as he required increased from 2 L baseline to currently at 6 to 8 L was noticed by family member during Saima he is to have increased work of breathing working out of ordinary, labs were pertinent for testing positive for COVID, chest x-ray showed bilateral infiltrates patient in the right lower lobe, ABG showed hypoxia without significant CO2 retention, RSV/COVID-negative, hemoglobin was low at 8.3 with bicarb mildly elevated WBC elevation at 20 also elevated both BNP and troponin  Past Medical/Surgical History:    has a past medical history of Personal history of other diseases of the respiratory system (09/28/2016), Personal history of other endocrine, nutritional and metabolic disease (01/13/2017), Unspecified asthma with (acute) exacerbation ([facility]) (08/15/2018), and Vitamin D deficiency, unspecified (02/11/2022).   has no past surgical history on file.   reports that he has never smoked. He has never used smokeless tobacco. He reports that he does not currently use alcohol. He reports that he does not use drugs.  Family History:   No relevant family history has been documented for this patient.    Allergies:     Grass pollen  and Penicillins      Social history:   reports that he has never smoked. He has never used smokeless tobacco. He reports that he does not currently use alcohol. He reports that he does not use drugs.      Review of Systems:   General: denies weight gain, denies loss of appetite, fever, chills, night sweats.  HEENT: denies headaches, dizziness, head trauma, visual changes, eye pain, tinnitus, nosebleeds, hoarseness or throat pain    Respiratory: denies chest pain, dyspnea, cough and hemoptysis  Cardiovascular: denies orthopnea, paroxysmal nocturnal dyspnea, leg swelling, and previous heart attack.    Gastrointestinal: denies pain, nausea vomiting, diarrhea, constipation, melena or bleeding.  Genitourinary: denies hematuria, frequency, urgency or dysuria  Neurology: denies syncope, seizures, paralysis, paraesthesia   Endocrine: denies polyuria, polydipsia, skin or hair changes, and heat or cold intolerance  Musculoskeletal: denies joint pain, swelling, arthritis or myalgia  Hematologic: denies bleeding, adenopathy and easy bruising  Skin: denies rashes and skin discoloration  Psychiatry: denies depression    Physical Exam:   Vital Signs:   Vitals:    12/28/24 1149   BP: 100/68   Pulse: 100   Resp: 18   Temp: 36.2 °C (97.2 °F)   SpO2: 99%     Vitals:    12/26/24 2327   Weight: 60 kg (132 lb 4.4 oz)         Input/Output:    Intake/Output Summary (Last 24 hours) at 12/28/2024 1254  Last data filed at 12/28/2024 1239  Gross per 24 hour   Intake 1966.24 ml   Output 2850 ml   Net -883.76 ml       Oxygen requirements:    Ventilator Information:  FiO2 (%):  [28 %] 28 %  S RR:  [12] 12  Oxygen Therapy/Pulse Ox  Medical Gas Therapy: Supplemental oxygen  Medical Gas Delivery Method: Nasal cannula  FiO2 (%): 28 %  SpO2: 99 %  Patient Activity During SpO2 Measurement: At rest  Temp: 36.2 °C (97.2 °F)        General appearance: Not in pain or distress, in no respiratory distress    HEENT: Atraumatic/normocephalic, EOMI, BRITTANY,  pharynx clear, moist mucosa, redness of the uvula appreciated,   Neck: Supple, no jugular venous distension, lymphadenopathy, thyromegaly or carotid bruits  Chest: Rhonchi  Cardiovascular: Normal S1, S2, regular rate and rhythm, no murmur, rub or gallop  Abdomen: Normal sounds present, soft, lax with no tenderness, no hepatosplenomegaly, and no masses  Extremities: No edema. Pulses are equally present.   Skin: intact, no rashes   Neurologic: Alert and oriented x 3, No focal deficit         Current Medications:   Scheduled medications  [START ON 12/29/2024] amLODIPine, 5 mg, oral, Daily  aspirin, 81 mg, oral, Daily  atorvastatin, 20 mg, oral, Nightly  azithromycin, 500 mg, intravenous, q24h  cefepime, 1 g, intravenous, q12h  dexAMETHasone, 6 mg, intravenous, Daily  dorzolamide, 1 drop, Both Eyes, TID  [START ON 12/29/2024] furosemide, 40 mg, intravenous, Daily  guaiFENesin, 600 mg, oral, BID  ipratropium-albuteroL, 3 mL, nebulization, TID  latanoprost, 1 drop, Both Eyes, Nightly  oxygen, , inhalation, Continuous - Inhalation  pantoprazole, 40 mg, oral, Daily before breakfast  pneumococcal polysaccharide, 0.5 mL, intramuscular, During hospitalization  polyethylene glycol, 17 g, oral, q48h  remdesivir, 100 mg, intravenous, q24h      Continuous medications  heparin, 0-4,000 Units/hr, Last Rate: 8 Units/hr (12/28/24 1239)      PRN medications  PRN medications: acetaminophen, albuterol, heparin, melatonin, ondansetron      Investigations:  Labs, radiological imaging and cardiac work up were personally reviewed    Results from last 7 days   Lab Units 12/28/24  0532 12/27/24  0617 12/26/24  2335   SODIUM mmol/L 138 140 141   POTASSIUM mmol/L 4.0 3.9 3.4*   CHLORIDE mmol/L 108* 110* 110*   CO2 mmol/L 17* 17* 18*   BUN mg/dL 42* 29* 25*   CREATININE mg/dL 1.89* 2.07* 1.88*   GLUCOSE mg/dL 155* 168* 194*   CALCIUM mg/dL 8.6 9.0 9.2     Results from last 7 days   Lab Units 12/28/24  0532   WBC AUTO x10*3/uL 24.6*   HEMOGLOBIN  g/dL 8.1*   HEMATOCRIT % 28.1*   PLATELETS AUTO x10*3/uL 351         XR chest 1 view    Result Date: 12/27/2024  STUDY: Chest Radiograph;  12/26/2024 11:54 PM INDICATION: Shortness of breath. COMPARISON: 10/16/2024 XR Chest ACCESSION NUMBER(S): [id number] ORDERING CLINICIAN: ZAINA MCDANIEL TECHNIQUE:  Frontal chest was obtained at 23:51 hours. FINDINGS: CARDIOMEDIASTINAL SILHOUETTE: Cardiomediastinal silhouette is stable in size and configuration.  LUNGS: Right basilar airspace disease/consolidation is demonstrated.  Left lung is grossly clear.  No overt edema.  No large pleural effusion. No pneumothorax.  ABDOMEN: No remarkable upper abdominal findings.  BONES: No acute osseous changes.    Right basilar airspace disease/consolidation most compatible with pneumonia. Signed by Alberto Yanez MD    Esophagogastroduodenoscopy (EGD)    Result Date: 12/13/2024  Table formatting from the original result was not included. Impression 3 cm hiatal hernia The upper third of the esophagus and middle third of the esophagus appeared normal. Two tongues of Ladson-colored mucosa (~1 cm ) suspicion for Amanda's esophagus; performed cold forceps biopsy The cardia, fundus of the stomach, body of the stomach, incisura, antrum, prepyloric region and pylorus appeared normal. The duodenal bulb, 1st part of the duodenum and 2nd part of the duodenum appeared normal. Findings 3 cm hiatal hernia without Javier lesions present - GE junction 35 cm from the incisors, diaphragmatic impression 38 cm from the incisors, confirmed by retroflexion. Hill grade I hiatal hernia The upper third of the esophagus and middle third of the esophagus appeared normal. Amanda's esophagus observed with 2 tongues and no associated lesion without using a distal attachment cap. The Z-line was 35 cm from the incisors; performed cold forceps biopsy The cardia, fundus of the stomach, body of the stomach, incisura, antrum, prepyloric region and pylorus appeared  normal. The duodenal bulb, 1st part of the duodenum and 2nd part of the duodenum appeared normal. Recommendation Await pathology results Follow up with PCP Protonix 40 mg once daily  Indication Esophagitis Staff Staff Role Manan Pollock MD Proceduralist Medications See Anesthesia Record. Preprocedure A history and physical has been performed, and patient medication allergies have been reviewed. The patient's tolerance of previous anesthesia has been reviewed. The risks and benefits of the procedure and the sedation options and risks were discussed with the patient. All questions were answered and informed consent obtained. Details of the Procedure The patient underwent monitored anesthesia care, which was administered by an anesthesia professional. The patient's blood pressure, ECG, ETCO2, heart rate, level of consciousness, oxygen and respirations were monitored throughout the procedure. The scope was introduced through the mouth and advanced to the second part of the duodenum. Retroflexion was performed in the cardia. The patient experienced no blood loss. The procedure was not difficult. The patient tolerated the procedure well. There were no apparent adverse events. Events Procedure Events Event Event Time ENDO SCOPE IN TIME 12/13/2024  2:28 PM ENDO SCOPE OUT TIME 12/13/2024  2:32 PM Specimens ID Type Source Tests Collected by Time 1 : COLD BX Tissue ESOPHAGOGASTRIC JUNCTION BIOPSY SURGICAL PATHOLOGY EXAM Manan Pollock MD 12/13/2024 1431 Procedure Location [facility] [address] [place] [id number] [phone] Referring Provider Kylie Danielle PA-C Procedure Provider Manan Pollock MD       Assessment  Rasheed Antonio IS 84 y.o. male  on day  1 of admission presenting with Acute myocardial infarction, unspecified MI type, unspecified artery (Multi). Actively being treated for the  following medical Problems:     84 y.o. male  on day  0 of admission presenting with Acute  myocardial infarction, unspecified MI type, unspecified artery (Multi).  From a nursing home with known history of CKD/anemia/GERD/dementia/anxiety/hypertension who presented to the ED with progressive hypertension blood systolic blood pressure of 190, increased oxygen demand as he required increased from 2 L baseline to currently at 6 to 8 L was noticed by family member during Saima he is to have increased work of breathing working out of ordinary, labs were pertinent for testing positive for COVID, chest x-ray showed bilateral infiltrates patient in the right lower lobe, ABG showed hypoxia without significant CO2 retention, RSV/COVID-negative, hemoglobin was low at 8.3 with bicarb mildly elevated WBC elevation at 20 also elevated both BNP and troponin  Acute on chronic hypoxia  Right lower lobe infiltrate with elevated white count  COVID-19 positive  Concern of non-ST elevation MI based on elevated troponin  Accelerated hypertension  Lactic acidosis likely multifactorial      Recommendation:  Due to hypoxia positive COVID-19 patient was started on combination of IV dexamethasone/remdesivir  Right lower lobe infiltrate in the context of underlying dementia does raise concern of aspiration, start patient on IV cefepime and azithromycin  Check nasal MRSA swab  Patient remains on IV heparin drip, cardiology evaluation is ongoing  Avoid volume overload add Mucinex/incentive spirometer  Repeat CBC with differential/procalcitonin in a.m.  Follow-up results 2D echo/trend troponin  Trend lactic acid  Airborne isolation  Repeat ABG in a.m., patient has no acute CO2 retention noninvasive ventilation could be discontinued if no significant CO2 retention noted and work of breathing improved  Will utilize heated high flow if necessary to maintain saturation target of 89 to 94%  Head of elevation aspiration precautions  Droplet plus isolation  Oxygen for saturation of 89 to 94%  Incentive spirometry  Bronchodilators therapy  as needed  PT/OT  DVT prophylaxis  Minimize benzodiazepine and narcotics  Encourage ambulation  Head evaluation and aspiration precautions.  Hour ago  Javi Weinberg MD  Pulmonary critical care consultant  12/28/24  12:54 PM       STAFF PHYSICIAN NOTE OF PERSONAL INVOLVEMENT IN CARE  As the attending physician, I certify that I personally reviewed the patient's history and personally examined the patient to confirm the physical findings described above, and that I reviewed the relevant imaging studies and available reports.  I also discussed the differential diagnosis and all of the proposed management plans with the patient and individuals accompanying the patient to this visit.  They had the opportunity to ask questions about the proposed management plans and to have those questions answered.

## 2024-12-29 VITALS
HEIGHT: 64 IN | OXYGEN SATURATION: 99 % | RESPIRATION RATE: 20 BRPM | SYSTOLIC BLOOD PRESSURE: 105 MMHG | HEART RATE: 88 BPM | TEMPERATURE: 97 F | DIASTOLIC BLOOD PRESSURE: 66 MMHG | BODY MASS INDEX: 22.58 KG/M2 | WEIGHT: 132.28 LBS

## 2024-12-29 LAB
ANION GAP SERPL CALC-SCNC: 17 MMOL/L (ref 10–20)
BACTERIA BLD CULT: NORMAL
BACTERIA BLD CULT: NORMAL
BUN SERPL-MCNC: 52 MG/DL (ref 6–23)
CALCIUM SERPL-MCNC: 8.2 MG/DL (ref 8.6–10.3)
CHLORIDE SERPL-SCNC: 106 MMOL/L (ref 98–107)
CO2 SERPL-SCNC: 18 MMOL/L (ref 21–32)
CREAT SERPL-MCNC: 1.81 MG/DL (ref 0.5–1.3)
EGFRCR SERPLBLD CKD-EPI 2021: 36 ML/MIN/1.73M*2
ERYTHROCYTE [DISTWIDTH] IN BLOOD BY AUTOMATED COUNT: 17.5 % (ref 11.5–14.5)
ERYTHROCYTE [DISTWIDTH] IN BLOOD BY AUTOMATED COUNT: 17.6 % (ref 11.5–14.5)
GLUCOSE SERPL-MCNC: 109 MG/DL (ref 74–99)
HCT VFR BLD AUTO: 24.4 % (ref 41–52)
HCT VFR BLD AUTO: 24.8 % (ref 41–52)
HGB BLD-MCNC: 7.2 G/DL (ref 13.5–17.5)
HGB BLD-MCNC: 7.3 G/DL (ref 13.5–17.5)
MCH RBC QN AUTO: 24.2 PG (ref 26–34)
MCH RBC QN AUTO: 24.3 PG (ref 26–34)
MCHC RBC AUTO-ENTMCNC: 29 G/DL (ref 32–36)
MCHC RBC AUTO-ENTMCNC: 29.9 G/DL (ref 32–36)
MCV RBC AUTO: 81 FL (ref 80–100)
MCV RBC AUTO: 84 FL (ref 80–100)
NRBC BLD-RTO: 0 /100 WBCS (ref 0–0)
NRBC BLD-RTO: 0 /100 WBCS (ref 0–0)
PLATELET # BLD AUTO: 278 X10*3/UL (ref 150–450)
PLATELET # BLD AUTO: 286 X10*3/UL (ref 150–450)
POTASSIUM SERPL-SCNC: 3.8 MMOL/L (ref 3.5–5.3)
RBC # BLD AUTO: 2.97 X10*6/UL (ref 4.5–5.9)
RBC # BLD AUTO: 3 X10*6/UL (ref 4.5–5.9)
SODIUM SERPL-SCNC: 137 MMOL/L (ref 136–145)
STAPHYLOCOCCUS SPEC CULT: NORMAL
UFH PPP CHRO-ACNC: 0.3 IU/ML
WBC # BLD AUTO: 19.1 X10*3/UL (ref 4.4–11.3)
WBC # BLD AUTO: 20.4 X10*3/UL (ref 4.4–11.3)

## 2024-12-29 PROCEDURE — 85520 HEPARIN ASSAY: CPT | Performed by: STUDENT IN AN ORGANIZED HEALTH CARE EDUCATION/TRAINING PROGRAM

## 2024-12-29 PROCEDURE — 2060000001 HC INTERMEDIATE ICU ROOM DAILY

## 2024-12-29 PROCEDURE — 2500000004 HC RX 250 GENERAL PHARMACY W/ HCPCS (ALT 636 FOR OP/ED): Performed by: INTERNAL MEDICINE

## 2024-12-29 PROCEDURE — 2500000001 HC RX 250 WO HCPCS SELF ADMINISTERED DRUGS (ALT 637 FOR MEDICARE OP): Performed by: INTERNAL MEDICINE

## 2024-12-29 PROCEDURE — 36415 COLL VENOUS BLD VENIPUNCTURE: CPT | Performed by: STUDENT IN AN ORGANIZED HEALTH CARE EDUCATION/TRAINING PROGRAM

## 2024-12-29 PROCEDURE — 80048 BASIC METABOLIC PNL TOTAL CA: CPT | Performed by: STUDENT IN AN ORGANIZED HEALTH CARE EDUCATION/TRAINING PROGRAM

## 2024-12-29 PROCEDURE — 2500000005 HC RX 250 GENERAL PHARMACY W/O HCPCS

## 2024-12-29 PROCEDURE — 99232 SBSQ HOSP IP/OBS MODERATE 35: CPT | Performed by: STUDENT IN AN ORGANIZED HEALTH CARE EDUCATION/TRAINING PROGRAM

## 2024-12-29 PROCEDURE — 85027 COMPLETE CBC AUTOMATED: CPT

## 2024-12-29 PROCEDURE — 2500000002 HC RX 250 W HCPCS SELF ADMINISTERED DRUGS (ALT 637 FOR MEDICARE OP, ALT 636 FOR OP/ED): Performed by: STUDENT IN AN ORGANIZED HEALTH CARE EDUCATION/TRAINING PROGRAM

## 2024-12-29 PROCEDURE — 94640 AIRWAY INHALATION TREATMENT: CPT

## 2024-12-29 PROCEDURE — 97162 PT EVAL MOD COMPLEX 30 MIN: CPT | Mod: GP

## 2024-12-29 PROCEDURE — 94660 CPAP INITIATION&MGMT: CPT

## 2024-12-29 PROCEDURE — 85027 COMPLETE CBC AUTOMATED: CPT | Performed by: STUDENT IN AN ORGANIZED HEALTH CARE EDUCATION/TRAINING PROGRAM

## 2024-12-29 PROCEDURE — 2500000001 HC RX 250 WO HCPCS SELF ADMINISTERED DRUGS (ALT 637 FOR MEDICARE OP)

## 2024-12-29 PROCEDURE — 2500000004 HC RX 250 GENERAL PHARMACY W/ HCPCS (ALT 636 FOR OP/ED)

## 2024-12-29 PROCEDURE — 2500000001 HC RX 250 WO HCPCS SELF ADMINISTERED DRUGS (ALT 637 FOR MEDICARE OP): Performed by: PHYSICIAN ASSISTANT

## 2024-12-29 RX ADMIN — FUROSEMIDE 40 MG: 10 INJECTION, SOLUTION INTRAMUSCULAR; INTRAVENOUS at 08:48

## 2024-12-29 RX ADMIN — Medication 5 MG: at 20:04

## 2024-12-29 RX ADMIN — REMDESIVIR 100 MG: 100 INJECTION, POWDER, LYOPHILIZED, FOR SOLUTION INTRAVENOUS at 02:34

## 2024-12-29 RX ADMIN — LATANOPROST 1 DROP: 50 SOLUTION/ DROPS OPHTHALMIC at 20:04

## 2024-12-29 RX ADMIN — PANTOPRAZOLE SODIUM 40 MG: 40 TABLET, DELAYED RELEASE ORAL at 07:38

## 2024-12-29 RX ADMIN — DORZOLAMIDE HCL 1 DROP: 20 SOLUTION/ DROPS OPHTHALMIC at 08:49

## 2024-12-29 RX ADMIN — IPRATROPIUM BROMIDE AND ALBUTEROL SULFATE 3 ML: .5; 3 SOLUTION RESPIRATORY (INHALATION) at 18:51

## 2024-12-29 RX ADMIN — ATORVASTATIN CALCIUM 20 MG: 20 TABLET, FILM COATED ORAL at 20:04

## 2024-12-29 RX ADMIN — GUAIFENESIN 600 MG: 600 TABLET, EXTENDED RELEASE ORAL at 08:49

## 2024-12-29 RX ADMIN — DORZOLAMIDE HCL 1 DROP: 20 SOLUTION/ DROPS OPHTHALMIC at 20:04

## 2024-12-29 RX ADMIN — IPRATROPIUM BROMIDE AND ALBUTEROL SULFATE 3 ML: .5; 3 SOLUTION RESPIRATORY (INHALATION) at 13:20

## 2024-12-29 RX ADMIN — HEPARIN SODIUM 900 UNITS/HR: 10000 INJECTION, SOLUTION INTRAVENOUS at 08:37

## 2024-12-29 RX ADMIN — CEFEPIME 1 G: 1 INJECTION, POWDER, FOR SOLUTION INTRAMUSCULAR; INTRAVENOUS at 12:00

## 2024-12-29 RX ADMIN — Medication 2 L/MIN: at 07:38

## 2024-12-29 RX ADMIN — GUAIFENESIN 600 MG: 600 TABLET, EXTENDED RELEASE ORAL at 20:04

## 2024-12-29 RX ADMIN — CEFEPIME 1 G: 1 INJECTION, POWDER, FOR SOLUTION INTRAMUSCULAR; INTRAVENOUS at 23:19

## 2024-12-29 RX ADMIN — IPRATROPIUM BROMIDE AND ALBUTEROL SULFATE 3 ML: .5; 3 SOLUTION RESPIRATORY (INHALATION) at 06:37

## 2024-12-29 RX ADMIN — AZITHROMYCIN MONOHYDRATE 500 MG: 500 INJECTION, POWDER, LYOPHILIZED, FOR SOLUTION INTRAVENOUS at 00:17

## 2024-12-29 RX ADMIN — AZITHROMYCIN MONOHYDRATE 500 MG: 500 INJECTION, POWDER, LYOPHILIZED, FOR SOLUTION INTRAVENOUS at 23:51

## 2024-12-29 RX ADMIN — Medication 30 PERCENT: at 20:05

## 2024-12-29 RX ADMIN — ASPIRIN 81 MG CHEWABLE TABLET 81 MG: 81 TABLET CHEWABLE at 08:49

## 2024-12-29 RX ADMIN — DEXAMETHASONE SODIUM PHOSPHATE 6 MG: 10 INJECTION INTRAMUSCULAR; INTRAVENOUS at 08:48

## 2024-12-29 RX ADMIN — DORZOLAMIDE HCL 1 DROP: 20 SOLUTION/ DROPS OPHTHALMIC at 15:16

## 2024-12-29 ASSESSMENT — COGNITIVE AND FUNCTIONAL STATUS - GENERAL
MOBILITY SCORE: 15
CLIMB 3 TO 5 STEPS WITH RAILING: TOTAL
WALKING IN HOSPITAL ROOM: A LOT
DRESSING REGULAR UPPER BODY CLOTHING: A LITTLE
MOBILITY SCORE: 15
DRESSING REGULAR LOWER BODY CLOTHING: A LITTLE
STANDING UP FROM CHAIR USING ARMS: A LOT
TOILETING: A LITTLE
PERSONAL GROOMING: A LITTLE
WALKING IN HOSPITAL ROOM: A LOT
DAILY ACTIVITIY SCORE: 19
MOVING FROM LYING ON BACK TO SITTING ON SIDE OF FLAT BED WITH BEDRAILS: A LITTLE
CLIMB 3 TO 5 STEPS WITH RAILING: TOTAL
MOVING TO AND FROM BED TO CHAIR: A LITTLE
STANDING UP FROM CHAIR USING ARMS: A LITTLE
TURNING FROM BACK TO SIDE WHILE IN FLAT BAD: A LITTLE
HELP NEEDED FOR BATHING: A LITTLE
MOVING TO AND FROM BED TO CHAIR: A LOT

## 2024-12-29 ASSESSMENT — PAIN SCALES - GENERAL
PAINLEVEL_OUTOF10: 0 - NO PAIN

## 2024-12-29 ASSESSMENT — PAIN - FUNCTIONAL ASSESSMENT
PAIN_FUNCTIONAL_ASSESSMENT: 0-10
PAIN_FUNCTIONAL_ASSESSMENT: 0-10

## 2024-12-29 NOTE — PROGRESS NOTES
"Rasheed Antonio is a 84 y.o. male on day 2 of admission presenting with Acute myocardial infarction, unspecified MI type, unspecified artery (Multi).    Subjective   Increase work of breathing, labs ok, BP sfot    Objective     Physical Exam  Constitutional:       Appearance: Normal appearance.   HENT:      Head: Normocephalic and atraumatic.      Right Ear: Tympanic membrane and ear canal normal.      Left Ear: Tympanic membrane and ear canal normal.      Mouth/Throat:      Mouth: Mucous membranes are moist.      Pharynx: Oropharynx is clear.   Eyes:      Extraocular Movements: Extraocular movements intact.      Conjunctiva/sclera: Conjunctivae normal.      Pupils: Pupils are equal, round, and reactive to light.   Cardiovascular:      Rate and Rhythm: Normal rate and regular rhythm.      Pulses: Normal pulses.      Heart sounds: Normal heart sounds.   Pulmonary:      Effort: Pulmonary effort is normal.      Breath sounds: Normal breath sounds.   Abdominal:      General: Abdomen is flat. Bowel sounds are normal.      Palpations: Abdomen is soft.   Musculoskeletal:         General: Normal range of motion.      Cervical back: Normal range of motion and neck supple.   Skin:     General: Skin is warm and dry.      Capillary Refill: Capillary refill takes 2 to 3 seconds.   Neurological:      General: No focal deficit present.      Mental Status: He is alert and oriented to person, place, and time. Mental status is at baseline.   Psychiatric:         Mood and Affect: Mood normal.         Behavior: Behavior normal.         Thought Content: Thought content normal.         Judgment: Judgment normal.         Last Recorded Vitals  Blood pressure 131/66, pulse 98, temperature 36.1 °C (97 °F), temperature source Temporal, resp. rate 24, height 1.626 m (5' 4\"), weight 60 kg (132 lb 4.4 oz), SpO2 95%.  Intake/Output last 3 Shifts:  I/O last 3 completed shifts:  In: 2426.9 (40.4 mL/kg) [P.O.:100; I.V.:1676.9 (27.9 mL/kg); IV " Piggyback:650]  Out: 4265 (71.1 mL/kg) [Urine:4265 (2 mL/kg/hr)]  Weight: 60 kg     Relevant Results            This patient currently has cardiac telemetry ordered; if you would like to modify or discontinue the telemetry order, click here to go to the orders activity to modify/discontinue the order.                 Assessment/Plan   Assessment & Plan  Acute myocardial infarction, unspecified MI type, unspecified artery (Multi)    Patient arrived to ED today after being picked up from assisted living facility for shortness of breath and oxygen saturations at 71% with patient's baseline 2 L nasal cannula increased to 5 L.  Patient placed on CPAP and route by EMS.  Patient was also found to be hypertensive 190/130, EMS gave 1 nitro and patient became hypotensive but patient reports feeling better.  In ED EKG was obtained which showed ischemic changes and possible MI, troponins are reflecting that with initial troponin 464 and second troponin 1453.  Per ED physician spoke with daughter-in-law who is power of  and wants patient to remain DNR CC arrest DNI.  Family did not want cardiac catheterization so patient is being treated with a continuous heparin drip.  Cardiology consulted appreciate recs.  Patient also found to be COVID-positive, will treat with COVID protocol.  Chest x-ray pending.  Pulmonology consult placed appreciate recs.  Patient also found to have electrolyte imbalance, electrolytes repleted, will trend with morning labs.     Patient admitted to Dr. Nick Barker for further medical management.      # Myocardial infarction?  # EKG changes  # Hypertensive PTA then Hypotensive  # Pulmonary Vascular Congestion  # Elevated BNP  # Elevated Troponin   # Hypokalemia  # Hypomagnesemia  -Continuous Heparin Drip, with heparin assays  -Cardiology consult  -Trend troponin x 1 more, initial Trope 464, 2nd trop 1453  -Replete magnesium and potassium  -N.p.o. diet while on BiPAP, may advance diet when appropriate  to cardiac  -admitted to inpatient with telemetry    -q4 vitals   -morning labs, trend electrolytes, troponin, repeat VBG     # Sepsis   # COVID-positive  # Leukocytosis  # Lactic acidosis  # Abnormal VBG  -DuoNeb scheduled, albuterol as needed  -0.9 normal saline at 75 mL/hour until BiPAP is removed  -Initiate remdesivir  -Continue BiPAP  -Dexamethasone  -Antibiotics initiated in ED will continue cefepime, azithromycin  -Blood cultures in process  -Pulmonology consult  -Droplet plus precautions  -Strep pneumo/Legionella urine ordered/UA  -Respiratory culture ordered     Chronic Conditions   # CKD  # Anemia  # GERD  # Dementia  # Anxiety     Continue home medications as ordered when nursing completes home med rec.    PT/OT/SW   DNR cc Arrest-DNI     #DVT Prophylaxis   Scd's as tolerated   DVT Prophylaxis on continuous heparin        12/27: doing better with conservative management, palliative consutled, heparin drip, echo pending    12/28: overall doing ok, cxr shows increased infiltrate,s lasix givne to see if it improves breathing. Progonsis is guarded     12/29: dc heparin, contineu lasix and supportive care      I spent 35 minutes in the professional and overall care of this patient.      Nick Barker, DO

## 2024-12-29 NOTE — CARE PLAN
The patient's goals for the shift include      The clinical goals for the shift include Maintain O2 92% or above  Problem: Fall/Injury  Goal: Not fall by end of shift  Outcome: Progressing  Goal: Be free from injury by end of the shift  Outcome: Progressing  Goal: Verbalize understanding of personal risk factors for fall in the hospital  Outcome: Progressing  Goal: Verbalize understanding of risk factor reduction measures to prevent injury from fall in the home  Outcome: Progressing  Goal: Use assistive devices by end of the shift  Outcome: Progressing  Goal: Pace activities to prevent fatigue by end of the shift  Outcome: Progressing

## 2024-12-29 NOTE — PROGRESS NOTES
Physical Therapy    Physical Therapy Evaluation    Patient Name: Rasheed Antonio  MRN: [id number]  Today's Date: 12/29/2024   Time Calculation  Start Time: 1027  Stop Time: 1046  Time Calculation (min): 19 min  812/812-A    Assessment/Plan   PT Assessment  PT Assessment Results: Decreased strength, Decreased endurance, Impaired balance, Decreased mobility, Decreased cognition, Impaired judgement, Decreased safety awareness  Rehab Prognosis: Fair  Evaluation/Treatment Tolerance: Patient limited by fatigue  End of Session Communication: Bedside nurse  Assessment Comment: Pt admitted 12/26 with covid and acute MI. Pt has dementia at baseline and is from [facility]. Pt demos decreased safety awareness, decreased strength, balance and endurance. Recommend moderate intensity therapy.  End of Session Patient Position: Bed, 3 rail up, Alarm on  IP OR SWING BED PT PLAN  Inpatient or Swing Bed: Inpatient  PT Plan  Treatment/Interventions: Bed mobility, Transfer training, Gait training, Balance training, Strengthening, Endurance training, Range of motion, Therapeutic exercise, Therapeutic activity  PT Plan: Ongoing PT  PT Frequency: 3 times per week  PT Discharge Recommendations: Moderate intensity level of continued care  PT - OK to Discharge: Yes (once medically cleared)    Subjective     Current Problem:  1. Acute myocardial infarction, unspecified MI type, unspecified artery (Multi)        2. Acute pneumonia        3. Leukocytosis, unspecified type        4. COVID-19          Patient Active Problem List   Diagnosis    Hypertension, essential    Mild dementia associated with alcoholism, without behavioral disturbance, psychotic disturbance, mood disturbance, or anxiety (Multi)    Gastroesophageal reflux disease without esophagitis    Glaucoma    CKD (chronic kidney disease) stage 3, GFR 30-59 ml/min (Multi)    Anxiety    Confusion    Anemia    Dark stools    Weakness    Pneumonia of both lower lobes due to infectious organism     Acute myocardial infarction, unspecified MI type, unspecified artery (Multi)       General Visit Information:  General  Reason for Referral: PT eval and treat; impaired mobility  Referred By: JUDE Aguilera  Past Medical History Relevant to Rehab: Pt admitted 12/26 with SOB 71% on baseline 3L NC. Pt found to have covid and acute MI. (PMH: Patient has a past medical history of CKD, anemia, GERD, dementia, anxiety, and HTN.)  Family/Caregiver Present: No  Prior to Session Communication: Bedside nurse  Patient Position Received: Bed, 3 rail up, Alarm on  General Comment: Pt pleasantly confused. Making nonsensical comments throughout assessment.    Home Living:  Home Living  Home Living Comments: Pt from [facility] and uses a rollator. Unable to provide any information at this time for prior level of function.    Prior Level of Function:  Prior Function Per Pt/Caregiver Report  Level of Slope: Unable to asses at this time    Precautions:  Precautions  Medical Precautions: Fall precautions     Objective     Pain:  Pain Assessment  Pain Assessment: 0-10  0-10 (Numeric) Pain Score: 0 - No pain    Cognition:  Cognition  Overall Cognitive Status: Impaired, Impaired at baseline  Orientation Level: Disoriented to place, Disoriented to time, Disoriented to situation    General Assessments:      Activity Tolerance  Endurance: Decreased tolerance for upright activites  Sensation  Light Touch: No apparent deficits  Strength  Strength Comments: demos at least 3/5 in BLE with mobility    Functional Assessments:     Bed Mobility  Bed Mobility:  (completed supine <> sitting with Waldo x1. Cues for sequencing and to stay on task.)  Transfers  Transfer:  (from EOB to FWW with Waldo x1 and cues for initiation. Demos no acute LOB.)  Ambulation/Gait Training  Ambulation/Gait Training Performed:  (completed a few lateral steps with decreased safety awareness with FWW. Waldo for balance and FWW management. Spo2 WFL.)         Extremity/Trunk Assessments:        RLE   RLE : Within Functional Limits  LLE   LLE : Within Functional Limits    Outcome Measures:     [facility] Basic Mobility  Turning from your back to your side while in a flat bed without using bedrails: A little  Moving from lying on your back to sitting on the side of a flat bed without using bedrails: A little  Moving to and from bed to chair (including a wheelchair): A little  Standing up from a chair using your arms (e.g. wheelchair or bedside chair): A little  To walk in hospital room: A lot  Climbing 3-5 steps with railing: Total  Basic Mobility - Total Score: 15        Goals:  Encounter Problems       Encounter Problems (Active)       PT Problem       PT Goal 1 STG - Pt will transition supine <> sitting with SBA  (Progressing)       Start:  12/29/24    Expected End:  01/12/25            PT Goal 2 STG - Pt will transfer STS with SBA  (Progressing)       Start:  12/29/24    Expected End:  01/12/25            PT Goal 3 STG - Pt will amb 50' using FWW with CGA  (Progressing)       Start:  12/29/24    Expected End:  01/12/25            PT Goal 4 STG - Pt will perform a B LE ther ex program of 2-3 sets of 10  (Progressing)       Start:  12/29/24    Expected End:  01/12/25                 Education Documentation  Precautions, taught by Florencia Hicks PT at 12/29/2024  2:15 PM.  Learner: Patient  Readiness: Acceptance  Method: Explanation  Response: No Evidence of Learning, Needs Reinforcement    Mobility Training, taught by Florencia Hicks PT at 12/29/2024  2:15 PM.  Learner: Patient  Readiness: Acceptance  Method: Explanation  Response: No Evidence of Learning, Needs Reinforcement    Education Comments  No comments found.

## 2024-12-29 NOTE — TREATMENT PLAN
Physical exam deferred secondary to COVID positivity.    Patient is negative for thousand 405.  Creatinine stable at 1.81.  Hemoglobin is 7.3.  Will DC heparin drip given anemia as well as 48 hours after presentation.    Will continue low-dose aspirin therapy.  Consider use of clopidogrel in the future.    Will continue low-dose IV Lasix for net negative fluid balance to improve pulmonary status.

## 2024-12-29 NOTE — PROGRESS NOTES
Today's date:12/29/24  Provider's name:Javi Weinberg MD  Patient medical record number:[id number]    Pulmonary critical care note:    Subjective:      Interval history was reviewed, discussed with patient RN and respiratory therapist  Daily progress:  12/29/2024:  Patient is currently on low-flow oxygen, intermittently confused, refused BiPAP  12/28/2024: Patient had episode of confusion which she remove the BiPAP is currently on oxygen via heated high flow versus nasal cannula to target saturation of 89 to 94%    History of Present Illness:      84 y.o. male  on day  2 of admission presenting with Acute myocardial infarction, unspecified MI type, unspecified artery (Multi).  From a nursing home with known history of CKD/anemia/GERD/dementia/anxiety/hypertension who presented to the ED with progressive hypertension blood systolic blood pressure of 190, increased oxygen demand as he required increased from 2 L baseline to currently at 6 to 8 L was noticed by family member during [place] he is to have increased work of breathing working out of ordinary, labs were pertinent for testing positive for COVID, chest x-ray showed bilateral infiltrates patient in the right lower lobe, ABG showed hypoxia without significant CO2 retention, RSV/COVID-negative, hemoglobin was low at 8.3 with bicarb mildly elevated WBC elevation at 20 also elevated both BNP and troponin  Past Medical/Surgical History:    has a past medical history of Personal history of other diseases of the respiratory system (09/28/2016), Personal history of other endocrine, nutritional and metabolic disease (01/13/2017), Unspecified asthma with (acute) exacerbation ([facility]) (08/15/2018), and Vitamin D deficiency, unspecified (02/11/2022).   has no past surgical history on file.   reports that he has never smoked. He has never used smokeless tobacco. He reports that he does not currently use alcohol. He reports that he does not use drugs.  Family History:   No  relevant family history has been documented for this patient.    Allergies:     Grass pollen and Penicillins      Social history:   reports that he has never smoked. He has never used smokeless tobacco. He reports that he does not currently use alcohol. He reports that he does not use drugs.      Review of Systems:   General: denies weight gain, denies loss of appetite, fever, chills, night sweats.  HEENT: denies headaches, dizziness, head trauma, visual changes, eye pain, tinnitus, nosebleeds, hoarseness or throat pain    Respiratory: denies chest pain, dyspnea, cough and hemoptysis  Cardiovascular: denies orthopnea, paroxysmal nocturnal dyspnea, leg swelling, and previous heart attack.    Gastrointestinal: denies pain, nausea vomiting, diarrhea, constipation, melena or bleeding.  Genitourinary: denies hematuria, frequency, urgency or dysuria  Neurology: denies syncope, seizures, paralysis, paraesthesia   Endocrine: denies polyuria, polydipsia, skin or hair changes, and heat or cold intolerance  Musculoskeletal: denies joint pain, swelling, arthritis or myalgia  Hematologic: denies bleeding, adenopathy and easy bruising  Skin: denies rashes and skin discoloration  Psychiatry: denies depression    Physical Exam:   Vital Signs:   Vitals:    12/29/24 0843   BP: 112/61   Pulse:    Resp: 24   Temp: 36.2 °C (97.2 °F)   SpO2: 96%     Vitals:    12/26/24 2327   Weight: 60 kg (132 lb 4.4 oz)         Input/Output:    Intake/Output Summary (Last 24 hours) at 12/29/2024 1001  Last data filed at 12/29/2024 0958  Gross per 24 hour   Intake 956.61 ml   Output 2865 ml   Net -1908.39 ml       Oxygen requirements:    Ventilator Information:  FiO2 (%):  [28 %-30 %] 30 %  S RR:  [12] 12  Oxygen Therapy/Pulse Ox  Medical Gas Therapy: Supplemental oxygen  Medical Gas Delivery Method: Nasal cannula  FiO2 (%): 30 %  SpO2: 96 %  Patient Activity During SpO2 Measurement: At rest  Temp: 36.2 °C (97.2 °F)        General appearance: Not in  pain or distress, in no respiratory distress    HEENT: Atraumatic/normocephalic, EOMI, BRITTANY, pharynx clear, moist mucosa, redness of the uvula appreciated,   Neck: Supple, no jugular venous distension, lymphadenopathy, thyromegaly or carotid bruits  Chest: Rhonchi  Cardiovascular: Normal S1, S2, regular rate and rhythm, no murmur, rub or gallop  Abdomen: Normal sounds present, soft, lax with no tenderness, no hepatosplenomegaly, and no masses  Extremities: No edema. Pulses are equally present.   Skin: intact, no rashes   Neurologic: Alert and oriented x 3, No focal deficit         Current Medications:   Scheduled medications  amLODIPine, 5 mg, oral, Daily  aspirin, 81 mg, oral, Daily  atorvastatin, 20 mg, oral, Nightly  azithromycin, 500 mg, intravenous, q24h  cefepime, 1 g, intravenous, q12h  dexAMETHasone, 6 mg, intravenous, Daily  dorzolamide, 1 drop, Both Eyes, TID  furosemide, 40 mg, intravenous, Daily  guaiFENesin, 600 mg, oral, BID  ipratropium-albuteroL, 3 mL, nebulization, TID  latanoprost, 1 drop, Both Eyes, Nightly  oxygen, , inhalation, Continuous - Inhalation  pantoprazole, 40 mg, oral, Daily before breakfast  pneumococcal polysaccharide, 0.5 mL, intramuscular, During hospitalization  polyethylene glycol, 17 g, oral, q48h  remdesivir, 100 mg, intravenous, q24h      Continuous medications  heparin, 0-4,000 Units/hr, Last Rate: 900 Units/hr (12/29/24 0958)      PRN medications  PRN medications: acetaminophen, albuterol, heparin, melatonin, ondansetron      Investigations:  Labs, radiological imaging and cardiac work up were personally reviewed    Results from last 7 days   Lab Units 12/29/24  0544 12/28/24  0532 12/27/24  0617   SODIUM mmol/L 137 138 140   POTASSIUM mmol/L 3.8 4.0 3.9   CHLORIDE mmol/L 106 108* 110*   CO2 mmol/L 18* 17* 17*   BUN mg/dL 52* 42* 29*   CREATININE mg/dL 1.81* 1.89* 2.07*   GLUCOSE mg/dL 109* 155* 168*   CALCIUM mg/dL 8.2* 8.6 9.0     Results from last 7 days   Lab Units  12/29/24  0544   WBC AUTO x10*3/uL 19.1*   HEMOGLOBIN g/dL 7.3*   HEMATOCRIT % 24.4*   PLATELETS AUTO x10*3/uL 286         XR chest 1 view    Result Date: 12/27/2024  STUDY: Chest Radiograph;  12/26/2024 11:54 PM INDICATION: Shortness of breath. COMPARISON: 10/16/2024 XR Chest ACCESSION NUMBER(S): [id number] ORDERING CLINICIAN: ZAINA MCDANIEL TECHNIQUE:  Frontal chest was obtained at 23:51 hours. FINDINGS: CARDIOMEDIASTINAL SILHOUETTE: Cardiomediastinal silhouette is stable in size and configuration.  LUNGS: Right basilar airspace disease/consolidation is demonstrated.  Left lung is grossly clear.  No overt edema.  No large pleural effusion. No pneumothorax.  ABDOMEN: No remarkable upper abdominal findings.  BONES: No acute osseous changes.    Right basilar airspace disease/consolidation most compatible with pneumonia. Signed by Alberto Yanez MD    Esophagogastroduodenoscopy (EGD)    Result Date: 12/13/2024  Table formatting from the original result was not included. Impression 3 cm hiatal hernia The upper third of the esophagus and middle third of the esophagus appeared normal. Two tongues of [place]-colored mucosa (~1 cm ) suspicion for Amanda's esophagus; performed cold forceps biopsy The cardia, fundus of the stomach, body of the stomach, incisura, antrum, prepyloric region and pylorus appeared normal. The duodenal bulb, 1st part of the duodenum and 2nd part of the duodenum appeared normal. Findings 3 cm hiatal hernia without Javier lesions present - GE junction 35 cm from the incisors, diaphragmatic impression 38 cm from the incisors, confirmed by retroflexion. Hill grade I hiatal hernia The upper third of the esophagus and middle third of the esophagus appeared normal. Amanda's esophagus observed with 2 tongues and no associated lesion without using a distal attachment cap. The Z-line was 35 cm from the incisors; performed cold forceps biopsy The cardia, fundus of the stomach, body of the stomach,  incisura, antrum, prepyloric region and pylorus appeared normal. The duodenal bulb, 1st part of the duodenum and 2nd part of the duodenum appeared normal. Recommendation Await pathology results Follow up with PCP Protonix 40 mg once daily  Indication Esophagitis Staff Staff Role Manan Pollock MD Proceduralist Medications See Anesthesia Record. Preprocedure A history and physical has been performed, and patient medication allergies have been reviewed. The patient's tolerance of previous anesthesia has been reviewed. The risks and benefits of the procedure and the sedation options and risks were discussed with the patient. All questions were answered and informed consent obtained. Details of the Procedure The patient underwent monitored anesthesia care, which was administered by an anesthesia professional. The patient's blood pressure, ECG, ETCO2, heart rate, level of consciousness, oxygen and respirations were monitored throughout the procedure. The scope was introduced through the mouth and advanced to the second part of the duodenum. Retroflexion was performed in the cardia. The patient experienced no blood loss. The procedure was not difficult. The patient tolerated the procedure well. There were no apparent adverse events. Events Procedure Events Event Event Time ENDO SCOPE IN TIME 12/13/2024  2:28 PM ENDO SCOPE OUT TIME 12/13/2024  2:32 PM Specimens ID Type Source Tests Collected by Time 1 : COLD BX Tissue ESOPHAGOGASTRIC JUNCTION BIOPSY SURGICAL PATHOLOGY EXAM Manan Pollock MD 12/13/2024 1431 Procedure Location [facility] [address] [facility] [id number] [phone] Referring Provider Kylie Danielle PA-C Procedure Provider Manan Pollock MD       Assessment  Rasheed Antonio IS 84 y.o. male  on day  2 of admission presenting with Acute myocardial infarction, unspecified MI type, unspecified artery (Multi). Actively being treated for the  following medical Problems:     84  y.o. male  on day  0 of admission presenting with Acute myocardial infarction, unspecified MI type, unspecified artery (Multi).  From a nursing home with known history of CKD/anemia/GERD/dementia/anxiety/hypertension who presented to the ED with progressive hypertension blood systolic blood pressure of 190, increased oxygen demand as he required increased from 2 L baseline to currently at 6 to 8 L was noticed by family member during [place] he is to have increased work of breathing working out of ordinary, labs were pertinent for testing positive for COVID, chest x-ray showed bilateral infiltrates patient in the right lower lobe, ABG showed hypoxia without significant CO2 retention, RSV/COVID-negative, hemoglobin was low at 8.3 with bicarb mildly elevated WBC elevation at 20 also elevated both BNP and troponin  Acute on chronic hypoxia  Right lower lobe infiltrate with elevated white count  COVID-19 positive  Concern of non-ST elevation MI based on elevated troponin  Accelerated hypertension  Lactic acidosis likely multifactorial      Recommendation:  Due to hypoxia positive COVID-19 patient was started on combination of IV dexamethasone/remdesivir  Right lower lobe infiltrate in the context of underlying dementia does raise concern of aspiration, start patient on IV cefepime and azithromycin  Nasal MRSA swab is negative  Patient remains on IV heparin drip, cardiology evaluation is ongoing  Avoid volume overload add Mucinex/incentive spirometer  Repeat CBC with differential/procalcitonin in a.m.  Follow-up results 2D echo/trend troponin  Trend lactic acid  Airborne isolation  Repeat ABG in a.m., patient has no acute CO2 retention noninvasive ventilation could be discontinued if no significant CO2 retention noted and work of breathing improved  Will utilize heated high flow if necessary to maintain saturation target of 89 to 94%  Head of elevation aspiration precautions  Droplet plus isolation  Oxygen for  saturation of 89 to 94%  Incentive spirometry  Bronchodilators therapy as needed  PT/OT  DVT prophylaxis  Minimize benzodiazepine and narcotics  Encourage ambulation  Head evaluation and aspiration precautions.  Exactly MRSA nasal swab is negative  Javi Weinberg MD  Pulmonary critical care consultant  12/29/24  10:01 AM       STAFF PHYSICIAN NOTE OF PERSONAL INVOLVEMENT IN CARE  As the attending physician, I certify that I personally reviewed the patient's history and personally examined the patient to confirm the physical findings described above, and that I reviewed the relevant imaging studies and available reports.  I also discussed the differential diagnosis and all of the proposed management plans with the patient and individuals accompanying the patient to this visit.  They had the opportunity to ask questions about the proposed management plans and to have those questions answered.

## 2024-12-29 NOTE — CARE PLAN
The patient's goals for the shift include      The clinical goals for the shift include Maintain O2 92% or above

## 2024-12-30 LAB
ANION GAP SERPL CALC-SCNC: 15 MMOL/L (ref 10–20)
BUN SERPL-MCNC: 56 MG/DL (ref 6–23)
CALCIUM SERPL-MCNC: 8.5 MG/DL (ref 8.6–10.3)
CHLORIDE SERPL-SCNC: 105 MMOL/L (ref 98–107)
CO2 SERPL-SCNC: 22 MMOL/L (ref 21–32)
CREAT SERPL-MCNC: 1.95 MG/DL (ref 0.5–1.3)
EGFRCR SERPLBLD CKD-EPI 2021: 33 ML/MIN/1.73M*2
ERYTHROCYTE [DISTWIDTH] IN BLOOD BY AUTOMATED COUNT: 17.4 % (ref 11.5–14.5)
GLUCOSE BLD MANUAL STRIP-MCNC: 134 MG/DL (ref 74–99)
GLUCOSE BLD MANUAL STRIP-MCNC: 158 MG/DL (ref 74–99)
GLUCOSE SERPL-MCNC: 117 MG/DL (ref 74–99)
HCT VFR BLD AUTO: 24.2 % (ref 41–52)
HGB BLD-MCNC: 7.3 G/DL (ref 13.5–17.5)
MCH RBC QN AUTO: 24 PG (ref 26–34)
MCHC RBC AUTO-ENTMCNC: 30.2 G/DL (ref 32–36)
MCV RBC AUTO: 80 FL (ref 80–100)
NRBC BLD-RTO: 0.1 /100 WBCS (ref 0–0)
PLATELET # BLD AUTO: 317 X10*3/UL (ref 150–450)
POTASSIUM SERPL-SCNC: 3.9 MMOL/L (ref 3.5–5.3)
RBC # BLD AUTO: 3.04 X10*6/UL (ref 4.5–5.9)
SODIUM SERPL-SCNC: 138 MMOL/L (ref 136–145)
WBC # BLD AUTO: 15.1 X10*3/UL (ref 4.4–11.3)

## 2024-12-30 PROCEDURE — 2500000004 HC RX 250 GENERAL PHARMACY W/ HCPCS (ALT 636 FOR OP/ED)

## 2024-12-30 PROCEDURE — 2500000005 HC RX 250 GENERAL PHARMACY W/O HCPCS

## 2024-12-30 PROCEDURE — 85027 COMPLETE CBC AUTOMATED: CPT | Performed by: STUDENT IN AN ORGANIZED HEALTH CARE EDUCATION/TRAINING PROGRAM

## 2024-12-30 PROCEDURE — 94660 CPAP INITIATION&MGMT: CPT

## 2024-12-30 PROCEDURE — 82947 ASSAY GLUCOSE BLOOD QUANT: CPT

## 2024-12-30 PROCEDURE — 2500000001 HC RX 250 WO HCPCS SELF ADMINISTERED DRUGS (ALT 637 FOR MEDICARE OP): Performed by: INTERNAL MEDICINE

## 2024-12-30 PROCEDURE — 99232 SBSQ HOSP IP/OBS MODERATE 35: CPT | Performed by: STUDENT IN AN ORGANIZED HEALTH CARE EDUCATION/TRAINING PROGRAM

## 2024-12-30 PROCEDURE — 2500000004 HC RX 250 GENERAL PHARMACY W/ HCPCS (ALT 636 FOR OP/ED): Performed by: INTERNAL MEDICINE

## 2024-12-30 PROCEDURE — 94640 AIRWAY INHALATION TREATMENT: CPT

## 2024-12-30 PROCEDURE — 99232 SBSQ HOSP IP/OBS MODERATE 35: CPT

## 2024-12-30 PROCEDURE — 2500000001 HC RX 250 WO HCPCS SELF ADMINISTERED DRUGS (ALT 637 FOR MEDICARE OP): Performed by: PHYSICIAN ASSISTANT

## 2024-12-30 PROCEDURE — 2060000001 HC INTERMEDIATE ICU ROOM DAILY

## 2024-12-30 PROCEDURE — 2500000002 HC RX 250 W HCPCS SELF ADMINISTERED DRUGS (ALT 637 FOR MEDICARE OP, ALT 636 FOR OP/ED): Performed by: STUDENT IN AN ORGANIZED HEALTH CARE EDUCATION/TRAINING PROGRAM

## 2024-12-30 PROCEDURE — 36415 COLL VENOUS BLD VENIPUNCTURE: CPT | Performed by: STUDENT IN AN ORGANIZED HEALTH CARE EDUCATION/TRAINING PROGRAM

## 2024-12-30 PROCEDURE — 80048 BASIC METABOLIC PNL TOTAL CA: CPT | Performed by: STUDENT IN AN ORGANIZED HEALTH CARE EDUCATION/TRAINING PROGRAM

## 2024-12-30 PROCEDURE — 2500000001 HC RX 250 WO HCPCS SELF ADMINISTERED DRUGS (ALT 637 FOR MEDICARE OP)

## 2024-12-30 RX ADMIN — GUAIFENESIN 600 MG: 600 TABLET, EXTENDED RELEASE ORAL at 20:22

## 2024-12-30 RX ADMIN — DORZOLAMIDE HCL 1 DROP: 20 SOLUTION/ DROPS OPHTHALMIC at 20:22

## 2024-12-30 RX ADMIN — IPRATROPIUM BROMIDE AND ALBUTEROL SULFATE 3 ML: .5; 3 SOLUTION RESPIRATORY (INHALATION) at 19:55

## 2024-12-30 RX ADMIN — Medication 2 L/MIN: at 06:53

## 2024-12-30 RX ADMIN — IPRATROPIUM BROMIDE AND ALBUTEROL SULFATE 3 ML: .5; 3 SOLUTION RESPIRATORY (INHALATION) at 06:52

## 2024-12-30 RX ADMIN — LATANOPROST 1 DROP: 50 SOLUTION/ DROPS OPHTHALMIC at 20:22

## 2024-12-30 RX ADMIN — ATORVASTATIN CALCIUM 20 MG: 20 TABLET, FILM COATED ORAL at 20:22

## 2024-12-30 RX ADMIN — IPRATROPIUM BROMIDE AND ALBUTEROL SULFATE 3 ML: .5; 3 SOLUTION RESPIRATORY (INHALATION) at 12:39

## 2024-12-30 RX ADMIN — PANTOPRAZOLE SODIUM 40 MG: 40 TABLET, DELAYED RELEASE ORAL at 08:57

## 2024-12-30 RX ADMIN — ACETAMINOPHEN 650 MG: 325 TABLET, FILM COATED ORAL at 20:22

## 2024-12-30 RX ADMIN — GUAIFENESIN 600 MG: 600 TABLET, EXTENDED RELEASE ORAL at 08:57

## 2024-12-30 RX ADMIN — DORZOLAMIDE HCL 1 DROP: 20 SOLUTION/ DROPS OPHTHALMIC at 18:30

## 2024-12-30 RX ADMIN — AMLODIPINE BESYLATE 5 MG: 5 TABLET ORAL at 08:57

## 2024-12-30 RX ADMIN — FUROSEMIDE 40 MG: 10 INJECTION, SOLUTION INTRAMUSCULAR; INTRAVENOUS at 08:57

## 2024-12-30 RX ADMIN — DORZOLAMIDE HCL 1 DROP: 20 SOLUTION/ DROPS OPHTHALMIC at 08:57

## 2024-12-30 RX ADMIN — Medication 5 MG: at 20:22

## 2024-12-30 RX ADMIN — CEFEPIME 1 G: 1 INJECTION, POWDER, FOR SOLUTION INTRAMUSCULAR; INTRAVENOUS at 12:31

## 2024-12-30 RX ADMIN — Medication 2 L/MIN: at 19:54

## 2024-12-30 RX ADMIN — Medication 2 L/MIN: at 09:18

## 2024-12-30 RX ADMIN — REMDESIVIR 100 MG: 100 INJECTION, POWDER, LYOPHILIZED, FOR SOLUTION INTRAVENOUS at 01:31

## 2024-12-30 RX ADMIN — DEXAMETHASONE SODIUM PHOSPHATE 6 MG: 10 INJECTION INTRAMUSCULAR; INTRAVENOUS at 08:57

## 2024-12-30 RX ADMIN — ASPIRIN 81 MG CHEWABLE TABLET 81 MG: 81 TABLET CHEWABLE at 08:57

## 2024-12-30 ASSESSMENT — PAIN DESCRIPTION - DESCRIPTORS: DESCRIPTORS: ACHING

## 2024-12-30 ASSESSMENT — COGNITIVE AND FUNCTIONAL STATUS - GENERAL
PERSONAL GROOMING: A LITTLE
DRESSING REGULAR UPPER BODY CLOTHING: A LITTLE
STANDING UP FROM CHAIR USING ARMS: A LOT
MOVING TO AND FROM BED TO CHAIR: A LOT
MOBILITY SCORE: 15
DAILY ACTIVITIY SCORE: 19
DRESSING REGULAR LOWER BODY CLOTHING: A LITTLE
HELP NEEDED FOR BATHING: A LITTLE
TOILETING: A LITTLE
CLIMB 3 TO 5 STEPS WITH RAILING: TOTAL
WALKING IN HOSPITAL ROOM: A LOT

## 2024-12-30 ASSESSMENT — PAIN DESCRIPTION - LOCATION: LOCATION: HEAD

## 2024-12-30 ASSESSMENT — PAIN - FUNCTIONAL ASSESSMENT
PAIN_FUNCTIONAL_ASSESSMENT: 0-10
PAIN_FUNCTIONAL_ASSESSMENT: 0-10

## 2024-12-30 ASSESSMENT — PAIN SCALES - GENERAL
PAINLEVEL_OUTOF10: 2
PAINLEVEL_OUTOF10: 1

## 2024-12-30 NOTE — CARE PLAN
The patient's goals for the shift include      The clinical goals for the shift include Maintain O2 above 92%    Problem: Fall/Injury  Goal: Not fall by end of shift  Outcome: Progressing  Goal: Be free from injury by end of the shift  Outcome: Progressing  Goal: Verbalize understanding of personal risk factors for fall in the hospital  Outcome: Progressing  Goal: Verbalize understanding of risk factor reduction measures to prevent injury from fall in the home  Outcome: Progressing  Goal: Use assistive devices by end of the shift  Outcome: Progressing  Goal: Pace activities to prevent fatigue by end of the shift  Outcome: Progressing

## 2024-12-30 NOTE — PROGRESS NOTES
Rasheed Antonio is a 84 y.o. male on day 3 of admission presenting with Acute myocardial infarction, unspecified MI type, unspecified artery (Multi).    Subjective   Upon assessment of the patient this morning, the patient was awake and stated he is feeling much better.  The patient stated he feels as though his legs are twitching.    Objective     Physical Exam  Constitutional:       General: He is not in acute distress.     Appearance: He is normal weight. He is not toxic-appearing or diaphoretic.      Comments: Elderly, pleasantly confused   HENT:      Head: Normocephalic and atraumatic.      Right Ear: External ear normal.      Left Ear: External ear normal.      Nose: Nose normal.      Mouth/Throat:      Mouth: Mucous membranes are moist.      Pharynx: Oropharynx is clear.   Eyes:      Extraocular Movements: Extraocular movements intact.      Pupils: Pupils are equal, round, and reactive to light.   Cardiovascular:      Rate and Rhythm: Normal rate and regular rhythm.      Pulses: Normal pulses.      Heart sounds: Normal heart sounds.   Pulmonary:      Effort: Pulmonary effort is normal. No respiratory distress.      Comments: Coarse lung sounds on expiration, greater in the right lung than left  Abdominal:      General: Bowel sounds are normal. There is no distension.      Palpations: Abdomen is soft.      Tenderness: There is no abdominal tenderness.   Genitourinary:     Comments: Indwelling urinary catheter in place  Musculoskeletal:         General: Normal range of motion.      Cervical back: Normal range of motion.      Right lower leg: Edema present.      Left lower leg: Edema present.     Comments: No noticeable muscle twitching of any of his extremities, however, the patient reports feeling as though he has twitching in his bilateral legs  Skin:     General: Skin is warm and dry.   Neurological:      General: No focal deficit present.      Mental Status: He is alert. He is disoriented.      Motor:  "Weakness present.      Comments: A & O x 1-2   Psychiatric:         Mood and Affect: Mood normal.         Behavior: Behavior normal.         Thought Content: Thought content normal.      Comments: Without restlessness     Last Recorded Vitals  Blood pressure 117/64, pulse 83, temperature 36.9 °C (98.4 °F), temperature source Temporal, resp. rate 20, height 1.626 m (5' 4\"), weight 60 kg (132 lb 4.4 oz), SpO2 96%.  Intake/Output last 3 Shifts:  I/O last 3 completed shifts:  In: 1679.9 (28 mL/kg) [P.O.:880; I.V.:149.9 (2.5 mL/kg); IV Piggyback:650]  Out: 5050 (84.2 mL/kg) [Urine:5050 (2.3 mL/kg/hr)]  Weight: 60 kg     Relevant Results  Scheduled medications  amLODIPine, 5 mg, oral, Daily  aspirin, 81 mg, oral, Daily  atorvastatin, 20 mg, oral, Nightly  azithromycin, 500 mg, intravenous, q24h  cefepime, 1 g, intravenous, q12h  dexAMETHasone, 6 mg, intravenous, Daily  dorzolamide, 1 drop, Both Eyes, TID  furosemide, 40 mg, intravenous, Daily  guaiFENesin, 600 mg, oral, BID  ipratropium-albuteroL, 3 mL, nebulization, TID  latanoprost, 1 drop, Both Eyes, Nightly  oxygen, , inhalation, Continuous - Inhalation  pantoprazole, 40 mg, oral, Daily before breakfast  pneumococcal polysaccharide, 0.5 mL, intramuscular, During hospitalization  polyethylene glycol, 17 g, oral, q48h  remdesivir, 100 mg, intravenous, q24h      Continuous medications     PRN medications  PRN medications: acetaminophen, albuterol, melatonin, ondansetron    Results for orders placed or performed during the hospital encounter of 12/26/24 (from the past 24 hours)   CBC   Result Value Ref Range    WBC 15.1 (H) 4.4 - 11.3 x10*3/uL    nRBC 0.1 (H) 0.0 - 0.0 /100 WBCs    RBC 3.04 (L) 4.50 - 5.90 x10*6/uL    Hemoglobin 7.3 (L) 13.5 - 17.5 g/dL    Hematocrit 24.2 (L) 41.0 - 52.0 %    MCV 80 80 - 100 fL    MCH 24.0 (L) 26.0 - 34.0 pg    MCHC 30.2 (L) 32.0 - 36.0 g/dL    RDW 17.4 (H) 11.5 - 14.5 %    Platelets 317 150 - 450 x10*3/uL   Basic Metabolic Panel   Result " Value Ref Range    Glucose 117 (H) 74 - 99 mg/dL    Sodium 138 136 - 145 mmol/L    Potassium 3.9 3.5 - 5.3 mmol/L    Chloride 105 98 - 107 mmol/L    Bicarbonate 22 21 - 32 mmol/L    Anion Gap 15 10 - 20 mmol/L    Urea Nitrogen 56 (H) 6 - 23 mg/dL    Creatinine 1.95 (H) 0.50 - 1.30 mg/dL    eGFR 33 (L) >60 mL/min/1.73m*2    Calcium 8.5 (L) 8.6 - 10.3 mg/dL       Assessment/Plan   IMP:  Rasheed Antonio is a 84 y.o. male with past medical history of dementia, chronic anemia, GERD, CKD, and anxiety,who was admitted under the medicine service 12/26/2024 from his AL with acute MI.  The patient had previously endorsed feeling short of breath especially while ambulating, and was noted to be hypoxemic for EMS, initially requiring CPAP then BiPAP.  Significant findings in the ED included twelve-lead ECG with ischemic changes as well as increasing troponins, with most recent 10,700; the patient was also noted to be COVID-positive.  Cardiology was consulted and did recommend cardiac catheterization, however, the patient's family did not elect for invasive intervention, and instead preferred conservative treatment with heparin infusion.  The patient's family further clarified the patient has DNR CCA DNI CODE STATUS.  Pulmonology was consulted due to hypoxemia and pulmonary vascular congestion on imaging.  Urology was consulted for urinary catheter placement.  Palliative care was consulted to discuss goals of care and advance care planning.     Recommendations  -The patient is DNR DNI CODE STATUS which is quite appropriate given his acute MI with no cardiac catheterization  -The patient is feeling better with conservative management  -We will schedule MiraLAX to be given every 48 hours to address bowel regimen and avoid constipation  -Family would like to see how the patient responds to conservative treatment before making further decisions of care  -At minimum, outpatient palliative care would be of benefit for ongoing symptom  management and goals of care discussions     12/27/2024-   The patient did not appear to be in any obvious distress, and stated he is feeling much better than on arrival.  He denied having pain and stated his breathing is much easier.  The patient is now on 1 L nasal cannula.  He is awaiting further evaluation and input from the cardiology and pulmonology consults.  The patient did endorse feeling slightly constipated, and noted his last bowel movement was approximately 3 days ago.  The patient currently has MiraLAX as needed, however, we will schedule this every 48 hours to address bowel regimen so as to avoid constipation.  I did speak with the patient's daughter-in-law and HC MARLYN, Shaniqua, over the phone this morning.  I introduced the practice of palliative care and the services it provides.  I then reviewed at length with patient/family the clinical diagnosis/medical problems that the patient presented with and the treatments provided so far. We discussed the implications of the current circumstances and option plans going forward.  Shaniqua stated the patient does live in an assisted living facility, but has been largely independent for all of his IADLs.  She stated the patient would prefer not to have invasive procedures, and so this is why they declined cardiac catheterization initially.  She stated they are looking forward to hearing recommendations by the cardiologist and the pulmonologist.  We did discuss the effects of myocardial infarct on the heart as well as the patient's potential ability for rehabilitation, discussing specifically that if the patient develops heart failure due to this incident, it may make it more difficult for him to work with PT/OT, causing more shortness of breath.  We did also discuss the patient's current COVID diagnosis.  Shaniqua stated the patient did have COVID recently prior to this admission, and stated the patient did develop acute anemia in early fall, requiring blood  transfusions at that time.  She stated she does look forward to input from the cardiologist and pulmonologist to know what the plan of care options may be moving forward.  I did discuss with Shaniqua my adjustment for bowel regimen, and she was agreeable.  I answered Shaniqua's questions and concerns to the best of my ability and offered emotional support.  Palliative care will continue to follow.    12/30/2024-   The patient did not appear to be in any obvious distress, and denied feeling pain and shortness of breath at this time.  The patient did report feeling as though he has movement in his legs, and described it as a type of twitching sensation.  Upon assessment, the patient did not have any sporadic jerking or twitching of any musculature in any of his extremities, but does have his SCDs in place, and was unable to describe if the feeling of squeezing of the SCDs is what is making him feel as though his legs are moving.  The patient does seem to be responding well to conservative management, and is currently asymptomatic of symptoms of discomfort.  I am hesitant to initiate any medication that may relax him in any way, as benzodiazepines especially can cause altered mentation and potential respiratory depression, and the patient is already at significant risk status post current MI.  We will monitor the patient's sensation of leg twitching.  If the plan is to remain for rehabilitation, we will recommend outpatient palliative care for ongoing symptom management and goals of care discussions.  Palliative care will continue to follow.        Thank you for allowing us to participate in the care of this gentleman.  Should you have any further questions or concerns regarding his care, please do not hesitate to contact us via Haiku/Epic Chat (Gissel Martin during weekdays work hours and Didier Chakraborty during weekdays after hours and over the weekend)     (This note was generated with voice recognition software and  may contain errors including spelling, grammar, syntax and misrecognition of what was dictated, that are not fully corrected)      Patient/proxy preference for information  Prefers full information    Goals of Care  The patient remains DNR CCA DNI CODE STATUS.  The goal is for continued conservative management and likely rehabilitation.           I spent 35 minutes in the professional and overall care of this patient.      Gissel Martin, APRN-CNP

## 2024-12-30 NOTE — PROGRESS NOTES
12/30/24 1210   Discharge Planning   Living Arrangements Alone   Support Systems Children   Assistance Needed yes   Type of Residence Assisted living   Care Facility Name [facility] or Post Acute Services In home services   Type of Post Acute Facility Services Other (Comment)  ([facility])   Expected Discharge Disposition HospiceHome   Does the patient need discharge transport arranged? Yes   RoundTrip coordination needed? Yes   Has discharge transport been arranged? No   Patient Choice   Patient / Family choosing to utilize agency / facility established prior to hospitalization Yes   Stroke Family Assessment   Stroke Family Assessment Needed No   Intensity of Service   Intensity of Service 0-30 min     Noted that patient is a resident of [facility], has a power of . Spoke to patient's MARLYN Mcgovern via telephone. Patient is a resident of [facility], has been active there with [facility]. While on the phone, she was on the phone with Deisy from [facility], had 3 way call. [facility] plans to take back when discharged at [facility]. Discussed progress in therapy, was given phone number for Verena the director of nursing at [facility]. Spoke to Vernea via telephone, she states that they can take him back with [facility] when discharged to [facility]. Will  notify social service of hospice care.

## 2024-12-30 NOTE — PROGRESS NOTES
Today's date:12/30/24  Provider's name:Javi Weinberg MD  Patient medical record number:[id number]    Pulmonary critical care note:    Subjective:      Interval history was reviewed, discussed with patient RN and respiratory therapist  Daily progress:  12/30/2024:  Patient is more alert, no fever no chills, no nausea no vomiting, on low-flow oxygen.    12/29/2024:  Patient is currently on low-flow oxygen, intermittently confused, refused BiPAP  12/28/2024: Patient had episode of confusion which she remove the BiPAP is currently on oxygen via heated high flow versus nasal cannula to target saturation of 89 to 94%    History of Present Illness:      84 y.o. male  on day  3 of admission presenting with Acute myocardial infarction, unspecified MI type, unspecified artery (Multi).  From a nursing home with known history of CKD/anemia/GERD/dementia/anxiety/hypertension who presented to the ED with progressive hypertension blood systolic blood pressure of 190, increased oxygen demand as he required increased from 2 L baseline to currently at 6 to 8 L was noticed by family member during Saima he is to have increased work of breathing working out of ordinary, labs were pertinent for testing positive for COVID, chest x-ray showed bilateral infiltrates patient in the right lower lobe, ABG showed hypoxia without significant CO2 retention, RSV/COVID-negative, hemoglobin was low at 8.3 with bicarb mildly elevated WBC elevation at 20 also elevated both BNP and troponin  Past Medical/Surgical History:    has a past medical history of Personal history of other diseases of the respiratory system (09/28/2016), Personal history of other endocrine, nutritional and metabolic disease (01/13/2017), Unspecified asthma with (acute) exacerbation ([facility]) (08/15/2018), and Vitamin D deficiency, unspecified (02/11/2022).   has no past surgical history on file.   reports that he has never smoked. He has never used smokeless tobacco. He reports  that he does not currently use alcohol. He reports that he does not use drugs.  Family History:   No relevant family history has been documented for this patient.    Allergies:     Grass pollen and Penicillins      Social history:   reports that he has never smoked. He has never used smokeless tobacco. He reports that he does not currently use alcohol. He reports that he does not use drugs.      Review of Systems:   General: denies weight gain, denies loss of appetite, fever, chills, night sweats.  HEENT: denies headaches, dizziness, head trauma, visual changes, eye pain, tinnitus, nosebleeds, hoarseness or throat pain    Respiratory: denies chest pain, dyspnea, cough and hemoptysis  Cardiovascular: denies orthopnea, paroxysmal nocturnal dyspnea, leg swelling, and previous heart attack.    Gastrointestinal: denies pain, nausea vomiting, diarrhea, constipation, melena or bleeding.  Genitourinary: denies hematuria, frequency, urgency or dysuria  Neurology: denies syncope, seizures, paralysis, paraesthesia   Endocrine: denies polyuria, polydipsia, skin or hair changes, and heat or cold intolerance  Musculoskeletal: denies joint pain, swelling, arthritis or myalgia  Hematologic: denies bleeding, adenopathy and easy bruising  Skin: denies rashes and skin discoloration  Psychiatry: denies depression    Physical Exam:   Vital Signs:   Vitals:    12/30/24 1149   BP: 106/56   Pulse: 84   Resp: 20   Temp: 36.7 °C (98.1 °F)   SpO2: 99%     Vitals:    12/26/24 2327   Weight: 60 kg (132 lb 4.4 oz)         Input/Output:    Intake/Output Summary (Last 24 hours) at 12/30/2024 1257  Last data filed at 12/30/2024 1124  Gross per 24 hour   Intake 725.55 ml   Output 3635 ml   Net -2909.45 ml       Oxygen requirements:    Ventilator Information:  FiO2 (%):  [28 %-30 %] 28 %  S RR:  [12] 12  Oxygen Therapy/Pulse Ox  Medical Gas Therapy: Supplemental oxygen  Medical Gas Delivery Method: Nasal cannula  FiO2 (%): 28 %  SpO2: 99 %  Patient  Activity During SpO2 Measurement: At rest  Temp: 36.7 °C (98.1 °F)        General appearance: Not in pain or distress, in no respiratory distress    HEENT: Atraumatic/normocephalic, EOMI, BRITTANY, pharynx clear, moist mucosa, redness of the uvula appreciated,   Neck: Supple, no jugular venous distension, lymphadenopathy, thyromegaly or carotid bruits  Chest: Rhonchi  Cardiovascular: Normal S1, S2, regular rate and rhythm, no murmur, rub or gallop  Abdomen: Normal sounds present, soft, lax with no tenderness, no hepatosplenomegaly, and no masses  Extremities: No edema. Pulses are equally present.   Skin: intact, no rashes   Neurologic: Alert and oriented x 3, No focal deficit         Current Medications:   Scheduled medications  amLODIPine, 5 mg, oral, Daily  aspirin, 81 mg, oral, Daily  atorvastatin, 20 mg, oral, Nightly  azithromycin, 500 mg, intravenous, q24h  cefepime, 1 g, intravenous, q12h  dexAMETHasone, 6 mg, intravenous, Daily  dorzolamide, 1 drop, Both Eyes, TID  furosemide, 40 mg, intravenous, Daily  guaiFENesin, 600 mg, oral, BID  ipratropium-albuteroL, 3 mL, nebulization, TID  latanoprost, 1 drop, Both Eyes, Nightly  oxygen, , inhalation, Continuous - Inhalation  pantoprazole, 40 mg, oral, Daily before breakfast  pneumococcal polysaccharide, 0.5 mL, intramuscular, During hospitalization  polyethylene glycol, 17 g, oral, q48h  remdesivir, 100 mg, intravenous, q24h      Continuous medications       PRN medications  PRN medications: acetaminophen, albuterol, melatonin, ondansetron      Investigations:  Labs, radiological imaging and cardiac work up were personally reviewed    Results from last 7 days   Lab Units 12/30/24  0558 12/29/24  0544 12/28/24  0532   SODIUM mmol/L 138 137 138   POTASSIUM mmol/L 3.9 3.8 4.0   CHLORIDE mmol/L 105 106 108*   CO2 mmol/L 22 18* 17*   BUN mg/dL 56* 52* 42*   CREATININE mg/dL 1.95* 1.81* 1.89*   GLUCOSE mg/dL 117* 109* 155*   CALCIUM mg/dL 8.5* 8.2* 8.6     Results from last  7 days   Lab Units 12/30/24  0558   WBC AUTO x10*3/uL 15.1*   HEMOGLOBIN g/dL 7.3*   HEMATOCRIT % 24.2*   PLATELETS AUTO x10*3/uL 317         XR chest 1 view    Result Date: 12/27/2024  STUDY: Chest Radiograph;  12/26/2024 11:54 PM INDICATION: Shortness of breath. COMPARISON: 10/16/2024 XR Chest ACCESSION NUMBER(S): [id number] ORDERING CLINICIAN: ZAINA MCDANIEL TECHNIQUE:  Frontal chest was obtained at 23:51 hours. FINDINGS: CARDIOMEDIASTINAL SILHOUETTE: Cardiomediastinal silhouette is stable in size and configuration.  LUNGS: Right basilar airspace disease/consolidation is demonstrated.  Left lung is grossly clear.  No overt edema.  No large pleural effusion. No pneumothorax.  ABDOMEN: No remarkable upper abdominal findings.  BONES: No acute osseous changes.    Right basilar airspace disease/consolidation most compatible with pneumonia. Signed by Alberto Yanez MD    Esophagogastroduodenoscopy (EGD)    Result Date: 12/13/2024  Table formatting from the original result was not included. Impression 3 cm hiatal hernia The upper third of the esophagus and middle third of the esophagus appeared normal. Two tongues of [place]-colored mucosa (~1 cm ) suspicion for Amanda's esophagus; performed cold forceps biopsy The cardia, fundus of the stomach, body of the stomach, incisura, antrum, prepyloric region and pylorus appeared normal. The duodenal bulb, 1st part of the duodenum and 2nd part of the duodenum appeared normal. Findings 3 cm hiatal hernia without Javier lesions present - GE junction 35 cm from the incisors, diaphragmatic impression 38 cm from the incisors, confirmed by retroflexion. Hill grade I hiatal hernia The upper third of the esophagus and middle third of the esophagus appeared normal. Amanda's esophagus observed with 2 tongues and no associated lesion without using a distal attachment cap. The Z-line was 35 cm from the incisors; performed cold forceps biopsy The cardia, fundus of the stomach, body of  the stomach, incisura, antrum, prepyloric region and pylorus appeared normal. The duodenal bulb, 1st part of the duodenum and 2nd part of the duodenum appeared normal. Recommendation Await pathology results Follow up with PCP Protonix 40 mg once daily  Indication Esophagitis Staff Staff Role Manan Pollock MD Proceduralist Medications See Anesthesia Record. Preprocedure A history and physical has been performed, and patient medication allergies have been reviewed. The patient's tolerance of previous anesthesia has been reviewed. The risks and benefits of the procedure and the sedation options and risks were discussed with the patient. All questions were answered and informed consent obtained. Details of the Procedure The patient underwent monitored anesthesia care, which was administered by an anesthesia professional. The patient's blood pressure, ECG, ETCO2, heart rate, level of consciousness, oxygen and respirations were monitored throughout the procedure. The scope was introduced through the mouth and advanced to the second part of the duodenum. Retroflexion was performed in the cardia. The patient experienced no blood loss. The procedure was not difficult. The patient tolerated the procedure well. There were no apparent adverse events. Events Procedure Events Event Event Time ENDO SCOPE IN TIME 12/13/2024  2:28 PM ENDO SCOPE OUT TIME 12/13/2024  2:32 PM Specimens ID Type Source Tests Collected by Time 1 : COLD BX Tissue ESOPHAGOGASTRIC JUNCTION BIOPSY SURGICAL PATHOLOGY EXAM Manan Pollock MD 12/13/2024 1431 Procedure Location [facility] [address] [facility] [id number] [phone] Referring Provider Kylie Danielle PA-C Procedure Provider Manan Pollock MD       Assessment  Rasheed Antonio IS 84 y.o. male  on day  3 of admission presenting with Acute myocardial infarction, unspecified MI type, unspecified artery (Multi). Actively being treated for the  following medical  Problems:     84 y.o. male  on day  0 of admission presenting with Acute myocardial infarction, unspecified MI type, unspecified artery (Multi).  From a nursing home with known history of CKD/anemia/GERD/dementia/anxiety/hypertension who presented to the ED with progressive hypertension blood systolic blood pressure of 190, increased oxygen demand as he required increased from 2 L baseline to currently at 6 to 8 L was noticed by family member during Saima he is to have increased work of breathing working out of ordinary, labs were pertinent for testing positive for COVID, chest x-ray showed bilateral infiltrates patient in the right lower lobe, ABG showed hypoxia without significant CO2 retention, RSV/COVID-negative, hemoglobin was low at 8.3 with bicarb mildly elevated WBC elevation at 20 also elevated both BNP and troponin  Acute on chronic hypoxia  Right lower lobe infiltrate with elevated white count  COVID-19 positive  Concern of non-ST elevation MI based on elevated troponin  Accelerated hypertension  Lactic acidosis likely multifactorial      Recommendation:  Due to hypoxia positive COVID-19 patient was started on combination of IV dexamethasone/remdesivir  Right lower lobe infiltrate in the context of underlying dementia does raise concern of aspiration, start patient on IV cefepime and azithromycin  Nasal MRSA swab is negative  Patient remains on IV heparin drip, cardiology evaluation is ongoing  Avoid volume overload add Mucinex/incentive spirometer  Repeat CBC with differential/procalcitonin in a.m.  Follow-up results 2D echo/trend troponin  Trend lactic acid  Airborne isolation  Repeat ABG in a.m., patient has no acute CO2 retention noninvasive ventilation could be discontinued if no significant CO2 retention noted and work of breathing improved  Will utilize heated high flow if necessary to maintain saturation target of 89 to 94%  Head of elevation aspiration precautions  Droplet plus  isolation  Oxygen for saturation of 89 to 94%  Incentive spirometry  Bronchodilators therapy as needed  PT/OT  DVT prophylaxis  Minimize benzodiazepine and narcotics  Encourage ambulation  Head evaluation and aspiration precautions.  Exactly MRSA nasal swab is negative  Javi Weinberg MD  Pulmonary critical care consultant  12/30/24  12:57 PM       STAFF PHYSICIAN NOTE OF PERSONAL INVOLVEMENT IN CARE  As the attending physician, I certify that I personally reviewed the patient's history and personally examined the patient to confirm the physical findings described above, and that I reviewed the relevant imaging studies and available reports.  I also discussed the differential diagnosis and all of the proposed management plans with the patient and individuals accompanying the patient to this visit.  They had the opportunity to ask questions about the proposed management plans and to have those questions answered.

## 2024-12-30 NOTE — PROGRESS NOTES
"Rasheed Antonio is a 84 y.o. male on day 3 of admission presenting with Acute myocardial infarction, unspecified MI type, unspecified artery (Multi).    Subjective   Increase work of breathing, labs ok, BP sfot    Objective     Physical Exam  Constitutional:       Appearance: Normal appearance.   HENT:      Head: Normocephalic and atraumatic.      Right Ear: Tympanic membrane and ear canal normal.      Left Ear: Tympanic membrane and ear canal normal.      Mouth/Throat:      Mouth: Mucous membranes are moist.      Pharynx: Oropharynx is clear.   Eyes:      Extraocular Movements: Extraocular movements intact.      Conjunctiva/sclera: Conjunctivae normal.      Pupils: Pupils are equal, round, and reactive to light.   Cardiovascular:      Rate and Rhythm: Normal rate and regular rhythm.      Pulses: Normal pulses.      Heart sounds: Normal heart sounds.   Pulmonary:      Effort: Pulmonary effort is normal.      Breath sounds: Normal breath sounds.   Abdominal:      General: Abdomen is flat. Bowel sounds are normal.      Palpations: Abdomen is soft.   Musculoskeletal:         General: Normal range of motion.      Cervical back: Normal range of motion and neck supple.   Skin:     General: Skin is warm and dry.      Capillary Refill: Capillary refill takes 2 to 3 seconds.   Neurological:      General: No focal deficit present.      Mental Status: He is alert and oriented to person, place, and time. Mental status is at baseline.   Psychiatric:         Mood and Affect: Mood normal.         Behavior: Behavior normal.         Thought Content: Thought content normal.         Judgment: Judgment normal.         Last Recorded Vitals  Blood pressure 114/67, pulse 94, temperature 36.6 °C (97.9 °F), temperature source Temporal, resp. rate 20, height 1.626 m (5' 4\"), weight 60 kg (132 lb 4.4 oz), SpO2 98%.  Intake/Output last 3 Shifts:  I/O last 3 completed shifts:  In: 1679.9 (28 mL/kg) [P.O.:880; I.V.:149.9 (2.5 mL/kg); IV " Piggyback:650]  Out: 5050 (84.2 mL/kg) [Urine:5050 (2.3 mL/kg/hr)]  Weight: 60 kg     Relevant Results            This patient currently has cardiac telemetry ordered; if you would like to modify or discontinue the telemetry order, click here to go to the orders activity to modify/discontinue the order.                 Assessment/Plan   Assessment & Plan  Acute myocardial infarction, unspecified MI type, unspecified artery (Multi)    Patient arrived to ED today after being picked up from assisted living facility for shortness of breath and oxygen saturations at 71% with patient's baseline 2 L nasal cannula increased to 5 L.  Patient placed on CPAP and route by EMS.  Patient was also found to be hypertensive 190/130, EMS gave 1 nitro and patient became hypotensive but patient reports feeling better.  In ED EKG was obtained which showed ischemic changes and possible MI, troponins are reflecting that with initial troponin 464 and second troponin 1453.  Per ED physician spoke with daughter-in-law who is power of  and wants patient to remain DNR CC arrest DNI.  Family did not want cardiac catheterization so patient is being treated with a continuous heparin drip.  Cardiology consulted appreciate recs.  Patient also found to be COVID-positive, will treat with COVID protocol.  Chest x-ray pending.  Pulmonology consult placed appreciate recs.  Patient also found to have electrolyte imbalance, electrolytes repleted, will trend with morning labs.     Patient admitted to Dr. Nick Barker for further medical management.      # Myocardial infarction?  # EKG changes  # Hypertensive PTA then Hypotensive  # Pulmonary Vascular Congestion  # Elevated BNP  # Elevated Troponin   # Hypokalemia  # Hypomagnesemia  -Continuous Heparin Drip, with heparin assays  -Cardiology consult  -Trend troponin x 1 more, initial Trope 464, 2nd trop 1453  -Replete magnesium and potassium  -N.p.o. diet while on BiPAP, may advance diet when  appropriate to cardiac  -admitted to inpatient with telemetry    -q4 vitals   -morning labs, trend electrolytes, troponin, repeat VBG     # Sepsis   # COVID-positive  # Leukocytosis  # Lactic acidosis  # Abnormal VBG  -DuoNeb scheduled, albuterol as needed  -0.9 normal saline at 75 mL/hour until BiPAP is removed  -Initiate remdesivir  -Continue BiPAP  -Dexamethasone  -Antibiotics initiated in ED will continue cefepime, azithromycin  -Blood cultures in process  -Pulmonology consult  -Droplet plus precautions  -Strep pneumo/Legionella urine ordered/UA  -Respiratory culture ordered     Chronic Conditions   # CKD  # Anemia  # GERD  # Dementia  # Anxiety     Continue home medications as ordered when nursing completes home med rec.    PT/OT/SW   DNR cc Arrest-DNI     #DVT Prophylaxis   Scd's as tolerated   DVT Prophylaxis on continuous heparin        12/27: doing better with conservative management, palliative consutled, heparin drip, echo pending    12/28: overall doing ok, cxr shows increased infiltrate,s lasix givne to see if it improves breathing. Progonsis is guarded     12/29: dc heparin, contineu lasix and supportive care    12/30: doing well resting, continue conservative management      I spent 35 minutes in the professional and overall care of this patient.      Nick Barker, DO

## 2024-12-31 LAB
BACTERIA BLD CULT: NORMAL
BACTERIA BLD CULT: NORMAL
GLUCOSE BLD MANUAL STRIP-MCNC: 108 MG/DL (ref 74–99)
GLUCOSE BLD MANUAL STRIP-MCNC: 140 MG/DL (ref 74–99)
GLUCOSE BLD MANUAL STRIP-MCNC: 142 MG/DL (ref 74–99)

## 2024-12-31 PROCEDURE — 94660 CPAP INITIATION&MGMT: CPT

## 2024-12-31 PROCEDURE — 2500000002 HC RX 250 W HCPCS SELF ADMINISTERED DRUGS (ALT 637 FOR MEDICARE OP, ALT 636 FOR OP/ED): Performed by: STUDENT IN AN ORGANIZED HEALTH CARE EDUCATION/TRAINING PROGRAM

## 2024-12-31 PROCEDURE — 82947 ASSAY GLUCOSE BLOOD QUANT: CPT

## 2024-12-31 PROCEDURE — 2500000004 HC RX 250 GENERAL PHARMACY W/ HCPCS (ALT 636 FOR OP/ED)

## 2024-12-31 PROCEDURE — 94640 AIRWAY INHALATION TREATMENT: CPT

## 2024-12-31 PROCEDURE — 2060000001 HC INTERMEDIATE ICU ROOM DAILY

## 2024-12-31 PROCEDURE — 92526 ORAL FUNCTION THERAPY: CPT | Mod: GN

## 2024-12-31 PROCEDURE — 2500000004 HC RX 250 GENERAL PHARMACY W/ HCPCS (ALT 636 FOR OP/ED): Performed by: INTERNAL MEDICINE

## 2024-12-31 PROCEDURE — 2500000001 HC RX 250 WO HCPCS SELF ADMINISTERED DRUGS (ALT 637 FOR MEDICARE OP): Performed by: INTERNAL MEDICINE

## 2024-12-31 PROCEDURE — 2500000005 HC RX 250 GENERAL PHARMACY W/O HCPCS

## 2024-12-31 PROCEDURE — 2500000001 HC RX 250 WO HCPCS SELF ADMINISTERED DRUGS (ALT 637 FOR MEDICARE OP): Performed by: PHYSICIAN ASSISTANT

## 2024-12-31 PROCEDURE — 2500000001 HC RX 250 WO HCPCS SELF ADMINISTERED DRUGS (ALT 637 FOR MEDICARE OP)

## 2024-12-31 RX ADMIN — IPRATROPIUM BROMIDE AND ALBUTEROL SULFATE 3 ML: .5; 3 SOLUTION RESPIRATORY (INHALATION) at 12:28

## 2024-12-31 RX ADMIN — AZITHROMYCIN MONOHYDRATE 500 MG: 500 INJECTION, POWDER, LYOPHILIZED, FOR SOLUTION INTRAVENOUS at 16:33

## 2024-12-31 RX ADMIN — IPRATROPIUM BROMIDE AND ALBUTEROL SULFATE 3 ML: .5; 3 SOLUTION RESPIRATORY (INHALATION) at 19:19

## 2024-12-31 RX ADMIN — GUAIFENESIN 600 MG: 600 TABLET, EXTENDED RELEASE ORAL at 20:21

## 2024-12-31 RX ADMIN — Medication 21 PERCENT: at 19:21

## 2024-12-31 RX ADMIN — FUROSEMIDE 40 MG: 10 INJECTION, SOLUTION INTRAMUSCULAR; INTRAVENOUS at 10:03

## 2024-12-31 RX ADMIN — ACETAMINOPHEN 650 MG: 325 TABLET, FILM COATED ORAL at 20:21

## 2024-12-31 RX ADMIN — Medication 21 PERCENT: at 08:11

## 2024-12-31 RX ADMIN — IPRATROPIUM BROMIDE AND ALBUTEROL SULFATE 3 ML: .5; 3 SOLUTION RESPIRATORY (INHALATION) at 08:06

## 2024-12-31 RX ADMIN — DORZOLAMIDE HCL 1 DROP: 20 SOLUTION/ DROPS OPHTHALMIC at 20:21

## 2024-12-31 RX ADMIN — LATANOPROST 1 DROP: 50 SOLUTION/ DROPS OPHTHALMIC at 20:21

## 2024-12-31 RX ADMIN — AMLODIPINE BESYLATE 5 MG: 5 TABLET ORAL at 10:03

## 2024-12-31 RX ADMIN — REMDESIVIR 100 MG: 100 INJECTION, POWDER, LYOPHILIZED, FOR SOLUTION INTRAVENOUS at 10:17

## 2024-12-31 RX ADMIN — ATORVASTATIN CALCIUM 20 MG: 20 TABLET, FILM COATED ORAL at 20:21

## 2024-12-31 RX ADMIN — DEXAMETHASONE SODIUM PHOSPHATE 6 MG: 10 INJECTION INTRAMUSCULAR; INTRAVENOUS at 10:03

## 2024-12-31 RX ADMIN — CEFEPIME 1 G: 1 INJECTION, POWDER, FOR SOLUTION INTRAMUSCULAR; INTRAVENOUS at 11:25

## 2024-12-31 RX ADMIN — CEFEPIME 1 G: 1 INJECTION, POWDER, FOR SOLUTION INTRAMUSCULAR; INTRAVENOUS at 00:27

## 2024-12-31 ASSESSMENT — COGNITIVE AND FUNCTIONAL STATUS - GENERAL
HELP NEEDED FOR BATHING: A LITTLE
TOILETING: A LITTLE
DRESSING REGULAR LOWER BODY CLOTHING: A LITTLE
MOBILITY SCORE: 15
TOILETING: A LITTLE
DRESSING REGULAR UPPER BODY CLOTHING: A LITTLE
DAILY ACTIVITIY SCORE: 19
MOVING TO AND FROM BED TO CHAIR: A LOT
STANDING UP FROM CHAIR USING ARMS: A LOT
PERSONAL GROOMING: A LITTLE
MOVING TO AND FROM BED TO CHAIR: A LOT
PERSONAL GROOMING: A LITTLE
WALKING IN HOSPITAL ROOM: A LOT
WALKING IN HOSPITAL ROOM: A LOT
STANDING UP FROM CHAIR USING ARMS: A LOT
MOBILITY SCORE: 15
CLIMB 3 TO 5 STEPS WITH RAILING: TOTAL
HELP NEEDED FOR BATHING: A LITTLE
CLIMB 3 TO 5 STEPS WITH RAILING: TOTAL
DAILY ACTIVITIY SCORE: 19
DRESSING REGULAR UPPER BODY CLOTHING: A LITTLE
DRESSING REGULAR LOWER BODY CLOTHING: A LITTLE

## 2024-12-31 ASSESSMENT — PAIN - FUNCTIONAL ASSESSMENT
PAIN_FUNCTIONAL_ASSESSMENT: 0-10

## 2024-12-31 ASSESSMENT — PAIN SCALES - GENERAL
PAINLEVEL_OUTOF10: 3
PAINLEVEL_OUTOF10: 0 - NO PAIN
PAINLEVEL_OUTOF10: 0 - NO PAIN

## 2024-12-31 ASSESSMENT — PAIN DESCRIPTION - DESCRIPTORS: DESCRIPTORS: ACHING

## 2024-12-31 NOTE — DOCUMENTATION CLARIFICATION NOTE
"    PATIENT:               ILAN BUNDY  ACCT #:                  [id number]  MRN:                       [id number]  :                       1940  ADMIT DATE:       2024 11:23 PM  DISCH DATE:  RESPONDING PROVIDER #:        [id number]          PROVIDER RESPONSE TEXT:    Acute Hypoxemic Respiratory Failure    CDI QUERY TEXT:    Clarification        Instruction:    Based on your assessment of the patient and the clinical information, please provide the requested documentation by clicking on the appropriate radio button and enter any additional information if prompted.    Question: Is there a diagnosis indicative of the clinical information    When answering this query, please exercise your independent professional judgment. The fact that a question is being asked, does not imply that any particular answer is desired or expected.    The patient's clinical indicators include:  Clinical Information:  84-year-old male who presents to the ED with shortness of breath from assisted living. Patient has a past medical history of CKD, CHF, anemia, GERD, dementia, anxiety, and HTN. Pt Covid positive.      Clinical Indicators:  24 Pulmonology:  \"Due to hypoxia positive COVID-19 patient was started on combination of IV dexamethasone/remdesivir\"    24 Progress note: \"Patient arrived to ED today after being picked up from assisted living facility for shortness of breath and oxygen saturations at 71 percent  with patient's baseline 2 L nasal cannula increased to 5 L.  Patient placed on CPAP and route by EMS.\"    24 Pulmonology:  \"2024:  Patient is currently on low-flow oxygen, intermittently confused, refused BiPAP  2024: Patient had episode of confusion which she remove the BiPAP is currently on oxygen via heated high flow versus nasal cannula to target saturation of 89 to 94 percent\"    Treatment:  CPAP, IV dexamethasone/remdesivir, Pulmonology consult, Duoneb    Risk Factors:  Covid 19, Chronic " O2  use  Options provided:  -- Acute Hypoxemic Respiratory Failure  -- Acute Hypercapnic Respiratory Failure  -- Acute Hypoxemic and Hypercapnic Respiratory Failure  -- No acute respiratory failure  -- Other - I will add my own diagnosis  -- Refer to Clinical Documentation Reviewer    Query created by: Antoinette Bell on 12/30/2024 12:26 PM      Electronically signed by:  MARLENY SALVADOR DO 12/31/2024 11:17 AM

## 2024-12-31 NOTE — CARE PLAN
The clinical goals for the shift include safety and comfort      Problem: Skin  Goal: Decreased wound size/increased tissue granulation at next dressing change  Outcome: Progressing  Goal: Participates in plan/prevention/treatment measures  Outcome: Progressing  Goal: Prevent/manage excess moisture  Outcome: Progressing  Flowsheets (Taken 12/31/2024 1608)  Prevent/manage excess moisture:   Cleanse incontinence/protect with barrier cream   Moisturize dry skin  Goal: Prevent/minimize sheer/friction injuries  Outcome: Progressing  Flowsheets (Taken 12/31/2024 1608)  Prevent/minimize sheer/friction injuries: Turn/reposition every 2 hours/use positioning/transfer devices  Goal: Promote/optimize nutrition  Outcome: Progressing  Flowsheets (Taken 12/31/2024 1608)  Promote/optimize nutrition:   Offer water/supplements/favorite foods   Assist with feeding  Goal: Promote skin healing  Outcome: Progressing     Problem: Fall/Injury  Goal: Not fall by end of shift  Outcome: Progressing  Goal: Be free from injury by end of the shift  Outcome: Progressing  Goal: Verbalize understanding of personal risk factors for fall in the hospital  Outcome: Progressing  Goal: Verbalize understanding of risk factor reduction measures to prevent injury from fall in the home  Outcome: Progressing  Goal: Use assistive devices by end of the shift  Outcome: Progressing  Goal: Pace activities to prevent fatigue by end of the shift  Outcome: Progressing

## 2024-12-31 NOTE — PROGRESS NOTES
Speech-Language Pathology    SLP Adult Inpatient  Speech-Language Pathology Treatment     Patient Name: Rasheed Antonio  MRN: [id number]  Today's Date: 12/31/2024  Time Calculation  Start Time: 0815  Stop Time: 0834  Time Calculation (min): 19 min       Current Problem:   1. Acute myocardial infarction, unspecified MI type, unspecified artery (Multi)        2. Acute pneumonia        3. Leukocytosis, unspecified type        4. COVID-19              Recommendations:   Ongoing Easy to Chew Diet with Thin Liquids  2.   Pills whole in puree carrier     Dysphagia Goals (established 12/28):   Patient will tolerate recommended diet without observed clinical signs of aspiration- GOAL MET 12/31   Pt/family education -Goal met 12/31 , wife  via phone .     SLP tx per POC:  Ongoing  mild oral dysphagia with suspected functional pharyngeal phase of swallow. Mastication of  solids  remains slow d/t overall fatigue  with pt on softer foods at baseline.   Min oral residuals scattered t/o oral cavity post slow mastication of a banana cleared  with a cued  liquid wash. Pt coaxed to eat/drink.    No overt s/s of aspiration, vocal quality remained clear, and respirations remained stable. Pt very confused.    Pt  to remain on current Easy to Chew solid  diet with thins for comfort given strict adherence to swallow controls with family aware and in agreement.   Pt Hospice per wife at [facility] with [facility] currently Hospice with SLP to sign off. Palliative care following in house.      Pain:  Pain Assessment  Pain Assessment: 0-10  0-10 (Numeric) Pain Score: 0 - No pain     Oral/Motor Assessment:  Oral Hygiene: Adequaate  Dentition: Complete Dentures  Oral Motor: Within Functional Limits     Consistencies Trialed:  Consistencies Trialed: Yes  Consistencies Trialed: Thin (IDDSI Level 0) - Straw, Thin (IDDSI Level 0) - Cup, Pureed/extremely thick (IDDSI Level 4), Easy to Chew solids  (IDDSI Level 7),     Clinical Observations:  Patient  Positioning: Upright in Bed  Was The 3 oz Swallow Protocol Completed: No pt only takes small 2-3 sips at a time.     Inpatient:  Education Comments  ain:  Pain Assessment  Pain Assessment: 0-10  0-10 (Numeric) Pain Score: 0 - No pain     Oral/Motor Assessment:  Oral Hygiene: Adequaate  Dentition: Complete Dentures  Oral Motor: Within Functional Limits     Consistencies Trialed:  Consistencies Trialed: Yes  Consistencies Trialed: Thin (IDDSI Level 0) - Straw, Thin (IDDSI Level 0) - Cup, Pureed/extremely thick (IDDSI Level 4), Regular (IDDSI Level 7), Nectar thick/mildly thick (IDDSI Level 2) - Cup     Clinical Observations:  Patient Positioning: Upright in Bed  Was The 3 oz Swallow Protocol Completed: No (Pt's respiratory system could not support consecutive sips of thin liquid)     Inpatient:  Education Comments  SLP has provided pt and caregiver the pt's  wife /RN with the results and recommendations of this session.

## 2024-12-31 NOTE — PROGRESS NOTES
Today's date:12/31/24  Provider's name:Javi Weinberg MD  Patient medical record number:[id number]    Pulmonary critical care note:    Subjective:      Interval history was reviewed, discussed with patient RN and respiratory therapist  Daily progress:  12/31/2024: Patient is more alert, he has no fever no chills, he is down to room air  12/30/2024:  Patient is more alert, no fever no chills, no nausea no vomiting, on low-flow oxygen.    12/29/2024:  Patient is currently on low-flow oxygen, intermittently confused, refused BiPAP  12/28/2024: Patient had episode of confusion which she remove the BiPAP is currently on oxygen via heated high flow versus nasal cannula to target saturation of 89 to 94%    History of Present Illness:      84 y.o. male  on day  4 of admission presenting with Acute myocardial infarction, unspecified MI type, unspecified artery (Multi).  From a nursing home with known history of CKD/anemia/GERD/dementia/anxiety/hypertension who presented to the ED with progressive hypertension blood systolic blood pressure of 190, increased oxygen demand as he required increased from 2 L baseline to currently at 6 to 8 L was noticed by family member during [place] he is to have increased work of breathing working out of ordinary, labs were pertinent for testing positive for COVID, chest x-ray showed bilateral infiltrates patient in the right lower lobe, ABG showed hypoxia without significant CO2 retention, RSV/COVID-negative, hemoglobin was low at 8.3 with bicarb mildly elevated WBC elevation at 20 also elevated both BNP and troponin  Past Medical/Surgical History:    has a past medical history of Personal history of other diseases of the respiratory system (09/28/2016), Personal history of other endocrine, nutritional and metabolic disease (01/13/2017), Unspecified asthma with (acute) exacerbation ([facility]) (08/15/2018), and Vitamin D deficiency, unspecified (02/11/2022).   has no past surgical history on  file.   reports that he has never smoked. He has never used smokeless tobacco. He reports that he does not currently use alcohol. He reports that he does not use drugs.  Family History:   No relevant family history has been documented for this patient.    Allergies:     Grass pollen and Penicillins      Social history:   reports that he has never smoked. He has never used smokeless tobacco. He reports that he does not currently use alcohol. He reports that he does not use drugs.      Review of Systems:   General: denies weight gain, denies loss of appetite, fever, chills, night sweats.  HEENT: denies headaches, dizziness, head trauma, visual changes, eye pain, tinnitus, nosebleeds, hoarseness or throat pain    Respiratory: denies chest pain, dyspnea, cough and hemoptysis  Cardiovascular: denies orthopnea, paroxysmal nocturnal dyspnea, leg swelling, and previous heart attack.    Gastrointestinal: denies pain, nausea vomiting, diarrhea, constipation, melena or bleeding.  Genitourinary: denies hematuria, frequency, urgency or dysuria  Neurology: denies syncope, seizures, paralysis, paraesthesia   Endocrine: denies polyuria, polydipsia, skin or hair changes, and heat or cold intolerance  Musculoskeletal: denies joint pain, swelling, arthritis or myalgia  Hematologic: denies bleeding, adenopathy and easy bruising  Skin: denies rashes and skin discoloration  Psychiatry: denies depression    Physical Exam:   Vital Signs:   Vitals:    12/31/24 0826   BP: 104/57   Pulse: 91   Resp: 20   Temp: 36.3 °C (97.3 °F)   SpO2: 94%     Vitals:    12/26/24 2327   Weight: 60 kg (132 lb 4.4 oz)         Input/Output:    Intake/Output Summary (Last 24 hours) at 12/31/2024 1108  Last data filed at 12/31/2024 0310  Gross per 24 hour   Intake 1060 ml   Output 2800 ml   Net -1740 ml       Oxygen requirements:    Ventilator Information:  FiO2 (%):  [21 %-28 %] 21 %  S RR:  [12] 12  Oxygen Therapy/Pulse Ox  Medical Gas Therapy: None (Room  air)  Medical Gas Delivery Method: Nasal cannula  FiO2 (%): 21 %  SpO2: 94 %  Patient Activity During SpO2 Measurement: At rest  Temp: 36.3 °C (97.3 °F)        General appearance: Not in pain or distress, in no respiratory distress    HEENT: Atraumatic/normocephalic, EOMI, BRITTANY, pharynx clear, moist mucosa, redness of the uvula appreciated,   Neck: Supple, no jugular venous distension, lymphadenopathy, thyromegaly or carotid bruits  Chest: Rhonchi  Cardiovascular: Normal S1, S2, regular rate and rhythm, no murmur, rub or gallop  Abdomen: Normal sounds present, soft, lax with no tenderness, no hepatosplenomegaly, and no masses  Extremities: No edema. Pulses are equally present.   Skin: intact, no rashes   Neurologic: Alert and oriented x 3, No focal deficit         Current Medications:   Scheduled medications  amLODIPine, 5 mg, oral, Daily  aspirin, 81 mg, oral, Daily  atorvastatin, 20 mg, oral, Nightly  azithromycin, 500 mg, intravenous, q24h  cefepime, 1 g, intravenous, q12h  dexAMETHasone, 6 mg, intravenous, Daily  dorzolamide, 1 drop, Both Eyes, TID  furosemide, 40 mg, intravenous, Daily  guaiFENesin, 600 mg, oral, BID  ipratropium-albuteroL, 3 mL, nebulization, TID  latanoprost, 1 drop, Both Eyes, Nightly  oxygen, , inhalation, Continuous - Inhalation  pantoprazole, 40 mg, oral, Daily before breakfast  pneumococcal polysaccharide, 0.5 mL, intramuscular, During hospitalization  polyethylene glycol, 17 g, oral, q48h      Continuous medications       PRN medications  PRN medications: acetaminophen, albuterol, melatonin, ondansetron      Investigations:  Labs, radiological imaging and cardiac work up were personally reviewed    Results from last 7 days   Lab Units 12/30/24  0558 12/29/24  0544 12/28/24  0532   SODIUM mmol/L 138 137 138   POTASSIUM mmol/L 3.9 3.8 4.0   CHLORIDE mmol/L 105 106 108*   CO2 mmol/L 22 18* 17*   BUN mg/dL 56* 52* 42*   CREATININE mg/dL 1.95* 1.81* 1.89*   GLUCOSE mg/dL 117* 109* 155*    CALCIUM mg/dL 8.5* 8.2* 8.6     Results from last 7 days   Lab Units 12/30/24  0558   WBC AUTO x10*3/uL 15.1*   HEMOGLOBIN g/dL 7.3*   HEMATOCRIT % 24.2*   PLATELETS AUTO x10*3/uL 317         XR chest 1 view    Result Date: 12/27/2024  STUDY: Chest Radiograph;  12/26/2024 11:54 PM INDICATION: Shortness of breath. COMPARISON: 10/16/2024 XR Chest ACCESSION NUMBER(S): [id number] ORDERING CLINICIAN: ZAINA MCDANIEL TECHNIQUE:  Frontal chest was obtained at 23:51 hours. FINDINGS: CARDIOMEDIASTINAL SILHOUETTE: Cardiomediastinal silhouette is stable in size and configuration.  LUNGS: Right basilar airspace disease/consolidation is demonstrated.  Left lung is grossly clear.  No overt edema.  No large pleural effusion. No pneumothorax.  ABDOMEN: No remarkable upper abdominal findings.  BONES: No acute osseous changes.    Right basilar airspace disease/consolidation most compatible with pneumonia. Signed by Alberto Yanez MD    Esophagogastroduodenoscopy (EGD)    Result Date: 12/13/2024  Table formatting from the original result was not included. Impression 3 cm hiatal hernia The upper third of the esophagus and middle third of the esophagus appeared normal. Two tongues of [place]-colored mucosa (~1 cm ) suspicion for Amanda's esophagus; performed cold forceps biopsy The cardia, fundus of the stomach, body of the stomach, incisura, antrum, prepyloric region and pylorus appeared normal. The duodenal bulb, 1st part of the duodenum and 2nd part of the duodenum appeared normal. Findings 3 cm hiatal hernia without Javier lesions present - GE junction 35 cm from the incisors, diaphragmatic impression 38 cm from the incisors, confirmed by retroflexion. Hill grade I hiatal hernia The upper third of the esophagus and middle third of the esophagus appeared normal. Amanda's esophagus observed with 2 tongues and no associated lesion without using a distal attachment cap. The Z-line was 35 cm from the incisors; performed cold forceps  biopsy The cardia, fundus of the stomach, body of the stomach, incisura, antrum, prepyloric region and pylorus appeared normal. The duodenal bulb, 1st part of the duodenum and 2nd part of the duodenum appeared normal. Recommendation Await pathology results Follow up with PCP Protonix 40 mg once daily  Indication Esophagitis Staff Staff Role Manan Pollock MD Proceduralist Medications See Anesthesia Record. Preprocedure A history and physical has been performed, and patient medication allergies have been reviewed. The patient's tolerance of previous anesthesia has been reviewed. The risks and benefits of the procedure and the sedation options and risks were discussed with the patient. All questions were answered and informed consent obtained. Details of the Procedure The patient underwent monitored anesthesia care, which was administered by an anesthesia professional. The patient's blood pressure, ECG, ETCO2, heart rate, level of consciousness, oxygen and respirations were monitored throughout the procedure. The scope was introduced through the mouth and advanced to the second part of the duodenum. Retroflexion was performed in the cardia. The patient experienced no blood loss. The procedure was not difficult. The patient tolerated the procedure well. There were no apparent adverse events. Events Procedure Events Event Event Time ENDO SCOPE IN TIME 12/13/2024  2:28 PM ENDO SCOPE OUT TIME 12/13/2024  2:32 PM Specimens ID Type Source Tests Collected by Time 1 : COLD BX Tissue ESOPHAGOGASTRIC JUNCTION BIOPSY SURGICAL PATHOLOGY EXAM Manan Pollock MD 12/13/2024 1431 Procedure Location [facility] [address] [facility] [address] [id number] [phone] Referring Provider Kylie Danielle PA-C Procedure Provider Manan Pollock MD       Assessment  Rasheed Antonio IS 84 y.o. male  on day  4 of admission presenting with Acute myocardial infarction, unspecified MI type, unspecified artery (Multi).  Actively being treated for the  following medical Problems:     84 y.o. male  on day  0 of admission presenting with Acute myocardial infarction, unspecified MI type, unspecified artery (Multi).  From a nursing home with known history of CKD/anemia/GERD/dementia/anxiety/hypertension who presented to the ED with progressive hypertension blood systolic blood pressure of 190, increased oxygen demand as he required increased from 2 L baseline to currently at 6 to 8 L was noticed by family member during [place] he is to have increased work of breathing working out of ordinary, labs were pertinent for testing positive for COVID, chest x-ray showed bilateral infiltrates patient in the right lower lobe, ABG showed hypoxia without significant CO2 retention, RSV/COVID-negative, hemoglobin was low at 8.3 with bicarb mildly elevated WBC elevation at 20 also elevated both BNP and troponin  Acute on chronic hypoxia  Right lower lobe infiltrate with elevated white count  COVID-19 positive  Concern of non-ST elevation MI based on elevated troponin  Accelerated hypertension  Lactic acidosis likely multifactorial      Recommendation:  Patient is down to room air  May discharge from pulm standpoint   maintain saturation target of 89 to 94%  Head of elevation aspiration precautions  Droplet plus isolation  Oxygen for saturation of 89 to 94%  Incentive spirometry  Bronchodilators therapy as needed  PT/OT  DVT prophylaxis  Minimize benzodiazepine and narcotics  Encourage ambulation  Head evaluation and aspiration precautions.    Javi Weinberg MD  Pulmonary critical care consultant  12/31/24  11:08 AM       STAFF PHYSICIAN NOTE OF PERSONAL INVOLVEMENT IN CARE  As the attending physician, I certify that I personally reviewed the patient's history and personally examined the patient to confirm the physical findings described above, and that I reviewed the relevant imaging studies and available reports.  I also discussed the differential  diagnosis and all of the proposed management plans with the patient and individuals accompanying the patient to this visit.  They had the opportunity to ask questions about the proposed management plans and to have those questions answered.

## 2024-12-31 NOTE — PROGRESS NOTES
Occupational Therapy                 Therapy Communication Note    Patient Name: Rasheed Antonio  MRN: [id number]  Department: [facility]  Room: [facility]/812-A  Today's Date: 12/31/2024     Discipline: Occupational Therapy    OT Missed Visit: Yes     Missed Visit Reason:  Chart reviewed and case conference with RN at 12:12 when attempted initial OT eval however patient adamantly refused, stating 'Leave me alone' multiple times.  Plan:  Continue to attempt as appropriate.    Missed Time: Attempt

## 2024-12-31 NOTE — PROGRESS NOTES
Rasheed Antonio is a 84 y.o. male on day 4 of admission presenting with Acute myocardial infarction, unspecified MI type, unspecified artery (Multi).      Subjective   Patient fully evaluated  12/31  for    Problem List Items Addressed This Visit       * (Principal) Acute myocardial infarction, unspecified MI type, unspecified artery (Multi) - Primary    Relevant Medications    amLODIPine (Norvasc) tablet 5 mg     Other Visit Diagnoses       Acute pneumonia        Leukocytosis, unspecified type        COVID-19              Patient seen resting in bed with head of bed elevated, no s/s or c/o acute difficulties at this time.  Vital signs for last 24 hours Temp:  [36 °C (96.8 °F)-37.2 °C (99 °F)] 36.4 °C (97.5 °F)  Heart Rate:  [69-95] 72  Resp:  [14-20] 18  BP: (102-118)/(57-65) 113/61  FiO2 (%):  [21 %-28 %] 21 %    I/O this shift:  In: -   Out: 2950 [Urine:2950]  Patient still requiring frequent cardiac and SPO2 monitoring. Continue aggressive pulmonary hygiene and oral hygiene. Off loading as tolerated for skin integrity. Medications and labs reviewed-   Results for orders placed or performed during the hospital encounter of 12/26/24 (from the past 24 hours)   POCT GLUCOSE   Result Value Ref Range    POCT Glucose 134 (H) 74 - 99 mg/dL   POCT GLUCOSE   Result Value Ref Range    POCT Glucose 108 (H) 74 - 99 mg/dL   POCT GLUCOSE   Result Value Ref Range    POCT Glucose 142 (H) 74 - 99 mg/dL      Patient recently received an antibiotic (last 12 hours)       Date/Time Action Medication Dose Rate    12/31/24 1633 New Bag    azithromycin 500 mg in dextrose 5% 250 mL  mg 250 mL/hr    12/31/24 1125 New Bag    cefepime (Maxipime) 1 g in dextrose 5% IV 50 mL 1 g 100 mL/hr        Patient seen today, no longer requiring supplemental oxygen, denies SOB, chest pain at this time. Per speech therapy today-  1. Ongoing Easy to Chew Diet with Thin Liquids  2.   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     Appreciate input and agree with plan. Will continue current and repeat labs in the AM.     Discharge planning discussed with patient and care team. Therapy evaluations ordered. [facility], anticipate HHC/SNF at discharge. Per [facility] - Noted that patient is a resident of [facility], has a power of . Spoke to patient's MARLYN Mcgovern via telephone. Patient is a resident of [facility] assisted living, has been active there with [facility]. While on the phone, she was on the phone with Deisy from [facility], had 3 way call. [facility] plans to take back when discharged at [facility]. Discussed progress in therapy, was given phone number for Verena the director of nursing at [facility]. Spoke to Verena via telephone, she states that they can take him back with [facility] when discharged to [facility]. Will  notify social service of hospice care. Patient aware and agreeable to current plan, continue plan as above.     I spent a total of 50 minutes on the date of the service which included preparing to see the patient, face-to-face patient care, completing clinical documentation, obtaining and/or reviewing separately obtained history, performing a medically appropriate examination, counseling and educating the patient/family/caregiver, ordering medications, tests, or procedures, communicating with other HCPs (not separately reported), independently interpreting results (not separately reported), communicating results to the patient/family/caregiver, and care coordination (not separately reported).        Objective     Last Recorded Vitals  /61   Pulse 72   Temp 36.4 °C (97.5 °F)   Resp 18   Wt 60 kg (132 lb 4.4 oz)   SpO2 94%   Intake/Output last 3  Shifts:    Intake/Output Summary (Last 24 hours) at 12/31/2024 1647  Last data filed at 12/31/2024 1507  Gross per 24 hour   Intake 650 ml   Output 3900 ml   Net -3250 ml       Admission Weight  Weight: 60 kg (132 lb 4.4 oz) (12/26/24 0437)    Daily Weight  12/26/24 : 60 kg (132 lb 4.4 oz)    Image Results  XR chest 1 view  Narrative: Interpreted By:  Courtney Bartholomew,   STUDY:  XR CHEST 1 VIEW;  12/28/2024 6:10 am      INDICATION:  Signs/Symptoms:worsening shortness of breath.      COMPARISON:  12/27/2024      ACCESSION NUMBER(S):  [id number]      ORDERING CLINICIAN:  GELA FLOREZ      FINDINGS:  The patient is rotated. The heart size may be normal. There are  atherosclerotic changes of the aorta.      There is bilateral parenchymal infiltration.      There are degenerative changes of the spine.      COMPARISON OF FINDING:  Lungs appear worse.      Impression: Worsening parenchymal infiltration.      MACRO:  none      Signed by: Courtney Bartholomew 12/28/2024 7:11 AM  Dictation workstation:   [id number]      Physical Exam    Relevant Results               Assessment/Plan   This patient currently has cardiac telemetry ordered; if you would like to modify or discontinue the telemetry order, click here to go to the orders activity to modify/discontinue the order.              Assessment & Plan  Acute myocardial infarction, unspecified MI type, unspecified artery (Multi)            Signed       Expand All Collapse All    History Of Present Illness  Rasheed piña is a 84-year-old male who presents to the ED with shortness of breath from assisted living [facility].  Patient has a past medical history of CKD, anemia, GERD, dementia, anxiety, and HTN.  Per ED note patient was brought in by EMS, EMS reported that patient was hypertensive in route with a blood pressure of 190/130, 1 sublingual nitro was given with blood pressure coming down to 112/60.  Patient reported that he felt better at that time.  Patient also wears  oxygen chronically at 2 L NC patient placed on 5 L nasal cannula at nursing facility with oxygen saturations still 71%, EMS placed patient on CPAP.  When patient arrived to ED blood pressure was now hypotensive.  Daughter-in-law at bedside answering questions, she reports that patient went out with her and family on [address] and did not show any signs of illness.  She denies any new out of the ordinary cough, fever, new weakness, vomiting, urinary frequency, or known syncopal episodes.  Patient reports he was walking to the bathroom today and that is when he realized he had such difficulty breathing.     ED Course      Hemodynamically Stable.    Vitals: Temp., , Resp.  20, /72, Pulse ox 100% BiPAP 40% FiO2     Labs: Glucose 194, Na+ 141, K+ 3.4, Bun 25, Creat.  1.88 GFR 34, Ca 9.2, Albumin 4.0, Alk Phos. 49, ALT 5, AST 13, , troponin 464 now 1453, Mg+ 1.54, Lactate 3.3, WBC 20.3,  Hgb.  8.3, Hct.  27.7, COVID-positive, VBG: pH 7.3, PO2 33, pCO2 35, base excess -8.4, HCO3 17.2, flu/RSV negative  Medications: Azithromycin, cefepime, heparin bolus, heparin infusion, DuoNeb, Solu-Medrol,  Imaging: interpreted by radiologist.  Chest x-ray: Pending      EKG: Not available for my review.  Interpreted by ED provider.  EKG of concern for acute ischemia.    Patient placed on BiPAP in ED, with improvement in mentation of patient.  Patient found to have EKG changes with elevated troponin so heparin drip was initiated.     Past Medical History  Medical History        Past Medical History:   Diagnosis Date    Personal history of other diseases of the respiratory system 09/28/2016     History of acute bronchitis    Personal history of other endocrine, nutritional and metabolic disease 01/13/2017     History of hyperlipidemia    Unspecified asthma with (acute) exacerbation ([facility]) 08/15/2018     Asthma exacerbation    Vitamin D deficiency, unspecified 02/11/2022     Vitamin D deficiency            Surgical  History  None noted as patient is wearing BiPAP he is unable to contribute.     Social History  He reports that he has never smoked. He has never used smokeless tobacco. He reports that he does not currently use alcohol. He reports that he does not use drugs.  Patient currently lives at [facility].     Family History  Noncontributory.     Allergies  Grass pollen and Penicillins     Review of Systems     10 point review of systems was obtained from daughter-in-law.  Patient wearing BiPAP and not wearing his hearing aids so he is unable to participate fully in questions being asked.     Physical Exam  Constitutional:       General: He is awake. He is not in acute distress.     Appearance: Normal appearance. He is normal weight. He is not toxic-appearing.      Interventions: Face mask in place.      Comments: On Bi-pap   HENT:      Head: Normocephalic and atraumatic.      Nose: Nose normal. No rhinorrhea.      Mouth/Throat:      Mouth: Mucous membranes are dry.   Eyes:      General:         Right eye: No discharge.         Left eye: No discharge.      Conjunctiva/sclera: Conjunctivae normal.      Pupils: Pupils are equal, round, and reactive to light.   Cardiovascular:      Rate and Rhythm: Normal rate and regular rhythm.      Pulses: Normal pulses.   Pulmonary:      Effort: Pulmonary effort is normal. No respiratory distress.      Breath sounds: Examination of the right-upper field reveals rhonchi. Examination of the left-upper field reveals rhonchi. Examination of the right-middle field reveals rhonchi and rales. Examination of the left-middle field reveals rhonchi and rales. Examination of the right-lower field reveals rales. Examination of the left-lower field reveals rales. Rhonchi and rales present. No wheezing.   Abdominal:      General: Abdomen is protuberant. Bowel sounds are normal. There is distension.      Palpations: Abdomen is rigid.      Tenderness: There is no abdominal tenderness.  "There is no guarding.   Musculoskeletal:         General: Normal range of motion.      Cervical back: Normal range of motion and neck supple.      Right lower leg: No edema.      Left lower leg: No edema.   Skin:     General: Skin is warm and dry.   Neurological:      General: No focal deficit present.      Mental Status: He is alert.      Motor: No weakness.   Psychiatric:         Attention and Perception: Attention normal.         Mood and Affect: Mood normal.         Behavior: Behavior normal.            Last Recorded Vitals  Blood pressure 106/63, pulse 99, temperature 36.8 °C (98.2 °F), temperature source Temporal, resp. rate 22, height 1.626 m (5' 4\"), weight 60 kg (132 lb 4.4 oz), SpO2 97%.     Relevant Results        Results for orders placed or performed during the hospital encounter of 12/26/24 (from the past 24 hours)   CBC and Auto Differential   Result Value Ref Range     WBC 20.3 (H) 4.4 - 11.3 x10*3/uL     nRBC 0.0 0.0 - 0.0 /100 WBCs     RBC 3.43 (L) 4.50 - 5.90 x10*6/uL     Hemoglobin 8.3 (L) 13.5 - 17.5 g/dL     Hematocrit 27.7 (L) 41.0 - 52.0 %     MCV 81 80 - 100 fL     MCH 24.2 (L) 26.0 - 34.0 pg     MCHC 30.0 (L) 32.0 - 36.0 g/dL     RDW 17.0 (H) 11.5 - 14.5 %     Platelets 357 150 - 450 x10*3/uL     Neutrophils % 92.0 40.0 - 80.0 %     Immature Granulocytes %, Automated 0.6 0.0 - 0.9 %     Lymphocytes % 2.8 13.0 - 44.0 %     Monocytes % 4.2 2.0 - 10.0 %     Eosinophils % 0.2 0.0 - 6.0 %     Basophils % 0.2 0.0 - 2.0 %     Neutrophils Absolute 18.60 (H) 1.60 - 5.50 x10*3/uL     Immature Granulocytes Absolute, Automated 0.13 0.00 - 0.50 x10*3/uL     Lymphocytes Absolute 0.57 (L) 0.80 - 3.00 x10*3/uL     Monocytes Absolute 0.85 (H) 0.05 - 0.80 x10*3/uL     Eosinophils Absolute 0.05 0.00 - 0.40 x10*3/uL     Basophils Absolute 0.05 0.00 - 0.10 x10*3/uL   Comprehensive metabolic panel   Result Value Ref Range     Glucose 194 (H) 74 - 99 mg/dL     Sodium 141 136 - 145 mmol/L     Potassium 3.4 (L) 3.5 " - 5.3 mmol/L     Chloride 110 (H) 98 - 107 mmol/L     Bicarbonate 18 (L) 21 - 32 mmol/L     Anion Gap 16 10 - 20 mmol/L     Urea Nitrogen 25 (H) 6 - 23 mg/dL     Creatinine 1.88 (H) 0.50 - 1.30 mg/dL     eGFR 34 (L) >60 mL/min/1.73m*2     Calcium 9.2 8.6 - 10.3 mg/dL     Albumin 4.0 3.4 - 5.0 g/dL     Alkaline Phosphatase 49 33 - 136 U/L     Total Protein 7.3 6.4 - 8.2 g/dL     AST 13 9 - 39 U/L     Bilirubin, Total 0.5 0.0 - 1.2 mg/dL     ALT 5 (L) 10 - 52 U/L   Magnesium   Result Value Ref Range     Magnesium 1.54 (L) 1.60 - 2.40 mg/dL   Phosphorus   Result Value Ref Range     Phosphorus 3.4 2.5 - 4.9 mg/dL   Lactate   Result Value Ref Range     Lactate 3.3 (H) 0.4 - 2.0 mmol/L   B-Type Natriuretic Peptide   Result Value Ref Range      (H) 0 - 99 pg/mL   Protime-INR   Result Value Ref Range     Protime 12.2 9.8 - 12.8 seconds     INR 1.1 0.9 - 1.1   aPTT   Result Value Ref Range     aPTT 29 27 - 38 seconds   Type And Screen   Result Value Ref Range     ABO TYPE A       Rh TYPE NEG       ANTIBODY SCREEN NEG     Blood Gas Venous   Result Value Ref Range     POCT pH, Venous 7.30 (L) 7.33 - 7.43 pH     POCT pCO2, Venous 35 (L) 41 - 51 mm Hg     POCT pO2, Venous 33 (L) 35 - 45 mm Hg     POCT SO2, Venous 46 45 - 75 %     POCT Oxy Hemoglobin, Venous 45.1 45.0 - 75.0 %     POCT Base Excess, Venous -8.4 (L) -2.0 - 3.0 mmol/L     POCT HCO3 Calculated, Venous 17.2 (L) 22.0 - 26.0 mmol/L     Patient Temperature 37.0 degrees Celsius     FiO2 40 %   Troponin I, High Sensitivity, Initial   Result Value Ref Range     Troponin I, High Sensitivity 464 (HH) 0 - 20 ng/L   Sars-CoV-2 PCR   Result Value Ref Range     Coronavirus 2019, PCR Detected (A) Not Detected   Influenza A, and B PCR   Result Value Ref Range     Flu A Result Not Detected Not Detected     Flu B Result Not Detected Not Detected   RSV PCR   Result Value Ref Range     RSV PCR Not Detected Not Detected   Blood Culture     Specimen: Peripheral Venipuncture; Blood  culture   Result Value Ref Range     Blood Culture Loaded on Instrument - Culture in progress     Blood Culture     Specimen: Peripheral Venipuncture; Blood culture   Result Value Ref Range     Blood Culture Loaded on Instrument - Culture in progress     MRSA Surveillance for Vancomycin De-escalation, PCR     Specimen: Anterior Nares; Swab   Result Value Ref Range     MRSA PCR Not Detected Not Detected   Troponin, High Sensitivity, 1 Hour   Result Value Ref Range     Troponin I, High Sensitivity 1,453 (HH) 0 - 20 ng/L   Lactate   Result Value Ref Range     Lactate 2.7 (H) 0.4 - 2.0 mmol/L   Troponin I, High Sensitivity   Result Value Ref Range     Troponin I, High Sensitivity 10,700 (HH) 0 - 20 ng/L   CBC   Result Value Ref Range     WBC 20.6 (H) 4.4 - 11.3 x10*3/uL     nRBC 0.0 0.0 - 0.0 /100 WBCs     RBC 3.39 (L) 4.50 - 5.90 x10*6/uL     Hemoglobin 8.2 (L) 13.5 - 17.5 g/dL     Hematocrit 28.2 (L) 41.0 - 52.0 %     MCV 83 80 - 100 fL     MCH 24.2 (L) 26.0 - 34.0 pg     MCHC 29.1 (L) 32.0 - 36.0 g/dL     RDW 17.1 (H) 11.5 - 14.5 %     Platelets 303 150 - 450 x10*3/uL   Lactate   Result Value Ref Range     Lactate 3.9 (H) 0.4 - 2.0 mmol/L   D-Dimer, Quantitative Non VTE   Result Value Ref Range     D-Dimer Non VTE, Quant (ng/mL FEU) 1,075 (H) <=500 ng/mL FEU   Basic metabolic panel   Result Value Ref Range     Glucose 168 (H) 74 - 99 mg/dL     Sodium 140 136 - 145 mmol/L     Potassium 3.9 3.5 - 5.3 mmol/L     Chloride 110 (H) 98 - 107 mmol/L     Bicarbonate 17 (L) 21 - 32 mmol/L     Anion Gap 17 10 - 20 mmol/L     Urea Nitrogen 29 (H) 6 - 23 mg/dL     Creatinine 2.07 (H) 0.50 - 1.30 mg/dL     eGFR 31 (L) >60 mL/min/1.73m*2     Calcium 9.0 8.6 - 10.3 mg/dL   Magnesium   Result Value Ref Range     Magnesium 2.20 1.60 - 2.40 mg/dL   Heparin Assay, UFH   Result Value Ref Range     Heparin Unfractionated 0.3 See Comment Below for Therapeutic Ranges IU/mL   Lactate   Result Value Ref Range     Lactate 3.8 (H) 0.4 - 2.0  mmol/L   Urinalysis with Reflex Culture and Microscopic   Result Value Ref Range     Color, Urine Light-Yellow Light-Yellow, Yellow, Dark-Yellow     Appearance, Urine Clear Clear     Specific Gravity, Urine 1.011 1.005 - 1.035     pH, Urine 5.0 5.0, 5.5, 6.0, 6.5, 7.0, 7.5, 8.0     Protein, Urine 10 (TRACE) NEGATIVE, 10 (TRACE), 20 (TRACE) mg/dL     Glucose, Urine Normal Normal mg/dL     Blood, Urine NEGATIVE NEGATIVE     Ketones, Urine NEGATIVE NEGATIVE mg/dL     Bilirubin, Urine NEGATIVE NEGATIVE     Urobilinogen, Urine Normal Normal mg/dL     Nitrite, Urine NEGATIVE NEGATIVE     Leukocyte Esterase, Urine NEGATIVE NEGATIVE   Urinalysis Microscopic   Result Value Ref Range     WBC, Urine 1-5 1-5, NONE /HPF     RBC, Urine NONE NONE, 1-2, 3-5 /HPF   Heparin Assay   Result Value Ref Range     Heparin Unfractionated 0.3 See Comment Below for Therapeutic Ranges IU/mL               Assessment/Plan     Assessment & Plan  Acute myocardial infarction, unspecified MI type, unspecified artery (Multi)           Patient arrived to ED today after being picked up from assisted living facility for shortness of breath and oxygen saturations at 71% with patient's baseline 2 L nasal cannula increased to 5 L.  Patient placed on CPAP and route by EMS.  Patient was also found to be hypertensive 190/130, EMS gave 1 nitro and patient became hypotensive but patient reports feeling better.  In ED EKG was obtained which showed ischemic changes and possible MI, troponins are reflecting that with initial troponin 464 and second troponin 1453.  Per ED physician spoke with daughter-in-law who is power of  and wants patient to remain DNR CC arrest DNI.  Family did not want cardiac catheterization so patient is being treated with a continuous heparin drip.  Cardiology consulted appreciate recs.  Patient also found to be COVID-positive, will treat with COVID protocol.  Chest x-ray pending.  Pulmonology consult placed appreciate recs.   Patient also found to have electrolyte imbalance, electrolytes repleted, will trend with morning labs.     Patient admitted to Dr. Nick Barker for further medical management.      # Myocardial infarction?  # EKG changes  # Hypertensive PTA then Hypotensive  # Pulmonary Vascular Congestion  # Elevated BNP  # Elevated Troponin   # Hypokalemia  # Hypomagnesemia  -Continuous Heparin Drip, with heparin assays  -Cardiology consult  -Trend troponin x 1 more, initial Trope 464, 2nd trop 1453  -Replete magnesium and potassium  -N.p.o. diet while on BiPAP, may advance diet when appropriate to cardiac  -admitted to inpatient with telemetry    -q4 vitals   -morning labs, trend electrolytes, troponin, repeat VBG     # Sepsis   # COVID-positive  # Leukocytosis  # Lactic acidosis  # Abnormal VBG  -DuoNeb scheduled, albuterol as needed  -0.9 normal saline at 75 mL/hour until BiPAP is removed  -Initiate remdesivir  -Continue BiPAP  -Dexamethasone  -Antibiotics initiated in ED will continue cefepime, azithromycin  -Blood cultures in process  -Pulmonology consult  -Droplet plus precautions  -Strep pneumo/Legionella urine ordered/UA  -Respiratory culture ordered     Chronic Conditions   # CKD  # Anemia  # GERD  # Dementia  # Anxiety     Continue home medications as ordered when nursing completes home med rec.    PT/OT/SW   DNR cc Arrest-DNI     #DVT Prophylaxis   Scd's as tolerated   DVT Prophylaxis on continuous heparin        12/27: doing better with conservative management, palliative consutled, heparin drip, echo pending        Patient fully evaluated, repeat labs in AM and continue plan as above.  I spent 75 minutes in the professional and overall care of this patient.                              Audra Schaeffer

## 2024-12-31 NOTE — CARE PLAN
The patient's goals for the shift include      The clinical goals for the shift include Maintain O2 above 92%      Problem: Skin  Goal: Decreased wound size/increased tissue granulation at next dressing change  Outcome: Progressing  Goal: Participates in plan/prevention/treatment measures  Outcome: Progressing  Goal: Prevent/manage excess moisture  Outcome: Progressing  Goal: Prevent/minimize sheer/friction injuries  Outcome: Progressing  Goal: Promote/optimize nutrition  Outcome: Progressing  Goal: Promote skin healing  Outcome: Progressing  Flowsheets (Taken 12/30/2024 2215)  Promote skin healing: Assess skin/pad under line(s)/device(s)     Problem: Fall/Injury  Goal: Not fall by end of shift  Outcome: Progressing  Goal: Be free from injury by end of the shift  Outcome: Progressing  Goal: Verbalize understanding of personal risk factors for fall in the hospital  Outcome: Progressing  Goal: Verbalize understanding of risk factor reduction measures to prevent injury from fall in the home  Outcome: Progressing  Goal: Use assistive devices by end of the shift  Outcome: Progressing  Goal: Pace activities to prevent fatigue by end of the shift  Outcome: Progressing       Over the shift, the patient did not make progress toward the following goals. Barriers to progression include

## 2024-12-31 NOTE — DOCUMENTATION CLARIFICATION NOTE
PATIENT:               ILAN BUNDY  ACCT #:                  [id number]  MRN:                       [id number]  :                       1940  ADMIT DATE:       2024 11:23 PM  DISCH DATE:  RESPONDING PROVIDER #:        [id number]          PROVIDER RESPONSE TEXT:    Acute Systolic Congestive Heart Failure    CDI QUERY TEXT:    Clarification      Instruction:    Based on your assessment of the patient and the clinical information, please provide the requested documentation by clicking on the appropriate radio button and enter any additional information if prompted.    Question: Please further clarify the acuity of congestive heart failure    When answering this query, please exercise your independent professional judgment. The fact that a question is being asked, does not imply that any particular answer is desired or expected.    The patient's clinical indicators include:  Clinical Information:  84-year-old male who presents to the ED with shortness of breath from assisted living. Patient has a past medical history of CKD, CHF, anemia, GERD, dementia, anxiety, and HTN. Pt Covid positive.    Clinical Indicators:  Labs:  24 BNP:  798    24 Cardiology:  ?HFrEF: Received IV fluids for hypotension  with worsening respiratory status.  Patient is also COVID-positive. IV fluids were stopped and received a dose of IV Lasix. X-ray was worse?    24 Progress note:  ?Pulmonary Vascular Congestion  Elevated BNP  Elevated Troponin?    Treatment:  IV Lasix 40 mg    Risk Factors:  HF, elevated BNP, Pulmonary Vascular Congestion  Options provided:  -- Acute Systolic Congestive Heart Failure  -- Acute on Chronic Systolic Congestive Heart Failure  -- Other - I will add my own diagnosis  -- Refer to Clinical Documentation Reviewer    Query created by: Antoinette Bell on 2024 11:54 AM      Electronically signed by:  MARLENY SALVADOR DO 2024 11:17 AM

## 2025-01-01 ENCOUNTER — APPOINTMENT (OUTPATIENT)
Dept: RADIOLOGY | Facility: HOSPITAL | Age: 85
End: 2025-01-01
Payer: MEDICARE

## 2025-01-01 LAB
ALBUMIN SERPL BCP-MCNC: 3.8 G/DL (ref 3.4–5)
ALP SERPL-CCNC: 44 U/L (ref 33–136)
ALT SERPL W P-5'-P-CCNC: 11 U/L (ref 10–52)
ANION GAP SERPL CALC-SCNC: 14 MMOL/L (ref 10–20)
AST SERPL W P-5'-P-CCNC: 12 U/L (ref 9–39)
BASOPHILS # BLD AUTO: 0.01 X10*3/UL (ref 0–0.1)
BASOPHILS NFR BLD AUTO: 0.1 %
BILIRUB SERPL-MCNC: 0.7 MG/DL (ref 0–1.2)
BUN SERPL-MCNC: 60 MG/DL (ref 6–23)
CALCIUM SERPL-MCNC: 9.1 MG/DL (ref 8.6–10.3)
CHLORIDE SERPL-SCNC: 103 MMOL/L (ref 98–107)
CO2 SERPL-SCNC: 25 MMOL/L (ref 21–32)
CREAT SERPL-MCNC: 1.87 MG/DL (ref 0.5–1.3)
EGFRCR SERPLBLD CKD-EPI 2021: 35 ML/MIN/1.73M*2
EOSINOPHIL # BLD AUTO: 0 X10*3/UL (ref 0–0.4)
EOSINOPHIL NFR BLD AUTO: 0 %
ERYTHROCYTE [DISTWIDTH] IN BLOOD BY AUTOMATED COUNT: 17 % (ref 11.5–14.5)
GLUCOSE BLD MANUAL STRIP-MCNC: 104 MG/DL (ref 74–99)
GLUCOSE BLD MANUAL STRIP-MCNC: 138 MG/DL (ref 74–99)
GLUCOSE BLD MANUAL STRIP-MCNC: 140 MG/DL (ref 74–99)
GLUCOSE BLD MANUAL STRIP-MCNC: 90 MG/DL (ref 74–99)
GLUCOSE SERPL-MCNC: 104 MG/DL (ref 74–99)
HCT VFR BLD AUTO: 31.2 % (ref 41–52)
HGB BLD-MCNC: 9.4 G/DL (ref 13.5–17.5)
IMM GRANULOCYTES # BLD AUTO: 0.28 X10*3/UL (ref 0–0.5)
IMM GRANULOCYTES NFR BLD AUTO: 1.9 % (ref 0–0.9)
LYMPHOCYTES # BLD AUTO: 1.54 X10*3/UL (ref 0.8–3)
LYMPHOCYTES NFR BLD AUTO: 10.3 %
MCH RBC QN AUTO: 23.8 PG (ref 26–34)
MCHC RBC AUTO-ENTMCNC: 30.1 G/DL (ref 32–36)
MCV RBC AUTO: 79 FL (ref 80–100)
MONOCYTES # BLD AUTO: 1.33 X10*3/UL (ref 0.05–0.8)
MONOCYTES NFR BLD AUTO: 8.9 %
NEUTROPHILS # BLD AUTO: 11.83 X10*3/UL (ref 1.6–5.5)
NEUTROPHILS NFR BLD AUTO: 78.8 %
NRBC BLD-RTO: 0.4 /100 WBCS (ref 0–0)
PLATELET # BLD AUTO: 449 X10*3/UL (ref 150–450)
POTASSIUM SERPL-SCNC: 3.8 MMOL/L (ref 3.5–5.3)
PROT SERPL-MCNC: 7.1 G/DL (ref 6.4–8.2)
RBC # BLD AUTO: 3.95 X10*6/UL (ref 4.5–5.9)
SODIUM SERPL-SCNC: 138 MMOL/L (ref 136–145)
WBC # BLD AUTO: 15 X10*3/UL (ref 4.4–11.3)

## 2025-01-01 PROCEDURE — 82947 ASSAY GLUCOSE BLOOD QUANT: CPT

## 2025-01-01 PROCEDURE — 2500000002 HC RX 250 W HCPCS SELF ADMINISTERED DRUGS (ALT 637 FOR MEDICARE OP, ALT 636 FOR OP/ED): Performed by: STUDENT IN AN ORGANIZED HEALTH CARE EDUCATION/TRAINING PROGRAM

## 2025-01-01 PROCEDURE — 2500000005 HC RX 250 GENERAL PHARMACY W/O HCPCS

## 2025-01-01 PROCEDURE — 36415 COLL VENOUS BLD VENIPUNCTURE: CPT | Performed by: INTERNAL MEDICINE

## 2025-01-01 PROCEDURE — 2500000001 HC RX 250 WO HCPCS SELF ADMINISTERED DRUGS (ALT 637 FOR MEDICARE OP)

## 2025-01-01 PROCEDURE — 84075 ASSAY ALKALINE PHOSPHATASE: CPT | Performed by: INTERNAL MEDICINE

## 2025-01-01 PROCEDURE — 2500000004 HC RX 250 GENERAL PHARMACY W/ HCPCS (ALT 636 FOR OP/ED): Performed by: INTERNAL MEDICINE

## 2025-01-01 PROCEDURE — 71045 X-RAY EXAM CHEST 1 VIEW: CPT

## 2025-01-01 PROCEDURE — 2060000001 HC INTERMEDIATE ICU ROOM DAILY

## 2025-01-01 PROCEDURE — 2500000004 HC RX 250 GENERAL PHARMACY W/ HCPCS (ALT 636 FOR OP/ED)

## 2025-01-01 PROCEDURE — 94640 AIRWAY INHALATION TREATMENT: CPT

## 2025-01-01 PROCEDURE — 2500000001 HC RX 250 WO HCPCS SELF ADMINISTERED DRUGS (ALT 637 FOR MEDICARE OP): Performed by: INTERNAL MEDICINE

## 2025-01-01 PROCEDURE — 94660 CPAP INITIATION&MGMT: CPT

## 2025-01-01 PROCEDURE — 85025 COMPLETE CBC W/AUTO DIFF WBC: CPT | Performed by: INTERNAL MEDICINE

## 2025-01-01 PROCEDURE — 71045 X-RAY EXAM CHEST 1 VIEW: CPT | Performed by: RADIOLOGY

## 2025-01-01 PROCEDURE — 2500000001 HC RX 250 WO HCPCS SELF ADMINISTERED DRUGS (ALT 637 FOR MEDICARE OP): Performed by: PHYSICIAN ASSISTANT

## 2025-01-01 RX ADMIN — AZITHROMYCIN MONOHYDRATE 500 MG: 500 INJECTION, POWDER, LYOPHILIZED, FOR SOLUTION INTRAVENOUS at 23:07

## 2025-01-01 RX ADMIN — DEXAMETHASONE SODIUM PHOSPHATE 6 MG: 10 INJECTION INTRAMUSCULAR; INTRAVENOUS at 11:33

## 2025-01-01 RX ADMIN — IPRATROPIUM BROMIDE AND ALBUTEROL SULFATE 3 ML: .5; 3 SOLUTION RESPIRATORY (INHALATION) at 20:48

## 2025-01-01 RX ADMIN — CEFEPIME 1 G: 1 INJECTION, POWDER, FOR SOLUTION INTRAMUSCULAR; INTRAVENOUS at 23:07

## 2025-01-01 RX ADMIN — DORZOLAMIDE HCL 1 DROP: 20 SOLUTION/ DROPS OPHTHALMIC at 17:42

## 2025-01-01 RX ADMIN — CEFEPIME 1 G: 1 INJECTION, POWDER, FOR SOLUTION INTRAMUSCULAR; INTRAVENOUS at 11:38

## 2025-01-01 RX ADMIN — LATANOPROST 1 DROP: 50 SOLUTION/ DROPS OPHTHALMIC at 20:02

## 2025-01-01 RX ADMIN — ATORVASTATIN CALCIUM 20 MG: 20 TABLET, FILM COATED ORAL at 20:02

## 2025-01-01 RX ADMIN — Medication 21 PERCENT: at 07:07

## 2025-01-01 RX ADMIN — GUAIFENESIN 600 MG: 600 TABLET, EXTENDED RELEASE ORAL at 11:33

## 2025-01-01 RX ADMIN — GUAIFENESIN 600 MG: 600 TABLET, EXTENDED RELEASE ORAL at 20:02

## 2025-01-01 RX ADMIN — AMLODIPINE BESYLATE 5 MG: 5 TABLET ORAL at 11:33

## 2025-01-01 RX ADMIN — CEFEPIME 1 G: 1 INJECTION, POWDER, FOR SOLUTION INTRAMUSCULAR; INTRAVENOUS at 00:48

## 2025-01-01 RX ADMIN — ASPIRIN 81 MG CHEWABLE TABLET 81 MG: 81 TABLET CHEWABLE at 11:33

## 2025-01-01 RX ADMIN — Medication 5 MG: at 20:02

## 2025-01-01 RX ADMIN — DORZOLAMIDE HCL 1 DROP: 20 SOLUTION/ DROPS OPHTHALMIC at 11:37

## 2025-01-01 RX ADMIN — ACETAMINOPHEN 650 MG: 325 TABLET, FILM COATED ORAL at 20:02

## 2025-01-01 RX ADMIN — IPRATROPIUM BROMIDE AND ALBUTEROL SULFATE 3 ML: .5; 3 SOLUTION RESPIRATORY (INHALATION) at 13:02

## 2025-01-01 RX ADMIN — IPRATROPIUM BROMIDE AND ALBUTEROL SULFATE 3 ML: .5; 3 SOLUTION RESPIRATORY (INHALATION) at 07:03

## 2025-01-01 RX ADMIN — FUROSEMIDE 40 MG: 10 INJECTION, SOLUTION INTRAMUSCULAR; INTRAVENOUS at 11:33

## 2025-01-01 ASSESSMENT — PAIN SCALES - GENERAL
PAINLEVEL_OUTOF10: 3
PAINLEVEL_OUTOF10: 0 - NO PAIN

## 2025-01-01 ASSESSMENT — COGNITIVE AND FUNCTIONAL STATUS - GENERAL
MOVING TO AND FROM BED TO CHAIR: A LOT
DRESSING REGULAR UPPER BODY CLOTHING: A LITTLE
WALKING IN HOSPITAL ROOM: TOTAL
PERSONAL GROOMING: A LITTLE
TOILETING: A LITTLE
WALKING IN HOSPITAL ROOM: TOTAL
MOBILITY SCORE: 14
DRESSING REGULAR LOWER BODY CLOTHING: A LITTLE
CLIMB 3 TO 5 STEPS WITH RAILING: TOTAL
DAILY ACTIVITIY SCORE: 19
STANDING UP FROM CHAIR USING ARMS: A LOT
HELP NEEDED FOR BATHING: A LITTLE
DAILY ACTIVITIY SCORE: 19
DRESSING REGULAR UPPER BODY CLOTHING: A LITTLE
STANDING UP FROM CHAIR USING ARMS: A LOT
HELP NEEDED FOR BATHING: A LITTLE
TOILETING: A LITTLE
DRESSING REGULAR LOWER BODY CLOTHING: A LITTLE
MOBILITY SCORE: 14
MOVING TO AND FROM BED TO CHAIR: A LOT
CLIMB 3 TO 5 STEPS WITH RAILING: TOTAL
PERSONAL GROOMING: A LITTLE

## 2025-01-01 ASSESSMENT — PAIN - FUNCTIONAL ASSESSMENT
PAIN_FUNCTIONAL_ASSESSMENT: 0-10

## 2025-01-01 ASSESSMENT — PAIN DESCRIPTION - DESCRIPTORS: DESCRIPTORS: ACHING

## 2025-01-01 NOTE — PROGRESS NOTES
Occupational Therapy                 Therapy Communication Note    Patient Name: Rasheed Antonio  MRN: [id number]  Department: [facility]  Room: [facility]/812-A  Today's Date: 1/1/2025     Discipline: Occupational Therapy    OT Missed Visit: Yes     Missed Visit Reason: Missed Visit Reason:  (per nursing, patient is resuming hospice care at this time and without skilled therapy needs; OT to d/c orders at this time; please reconsult as needed)    Missed Time: Attempt

## 2025-01-01 NOTE — CARE PLAN
The patient's goals for the shift include      The clinical goals for the shift include remain HDS    Over the shift, the patient will maintain comfort and safety, refrain from respiratory distress and refrain from further complications

## 2025-01-01 NOTE — PROGRESS NOTES
Today's attending admit note admission orders reviewed and amended as needed Rasheed Antonio is a 84 y.o. male on day 5 of admission presenting with call with an acute MI      Subjective   Patient currently feels okay feels tired is off oxygen on room air denies any chest pain palpitation orthopnea coughing or hemoptysis       Objective     Last Recorded Vitals  /68 (BP Location: Left arm, Patient Position: Lying)   Pulse 62   Temp 35.3 °C (95.5 °F) (Temporal)   Resp 18   Wt 60 kg (132 lb 4.4 oz)   SpO2 93%   Intake/Output last 3 Shifts:    Intake/Output Summary (Last 24 hours) at 1/1/2025 1745  Last data filed at 1/1/2025 0900  Gross per 24 hour   Intake --   Output 1250 ml   Net -1250 ml       Admission Weight  Weight: 60 kg (132 lb 4.4 oz) (12/26/24 6037)    Daily Weight  12/26/24 : 60 kg (132 lb 4.4 oz)    Image Results  XR chest 1 view  Narrative: Interpreted By:  Courtney Bartholomew,   STUDY:  XR CHEST 1 VIEW;  12/28/2024 6:10 am      INDICATION:  Signs/Symptoms:worsening shortness of breath.      COMPARISON:  12/27/2024      ACCESSION NUMBER(S):  [id number]      ORDERING CLINICIAN:  GELA FLOREZ      FINDINGS:  The patient is rotated. The heart size may be normal. There are  atherosclerotic changes of the aorta.      There is bilateral parenchymal infiltration.      There are degenerative changes of the spine.      COMPARISON OF FINDING:  Lungs appear worse.      Impression: Worsening parenchymal infiltration.      MACRO:  none      Signed by: Courtney Bartholomew 12/28/2024 7:11 AM  Dictation workstation:   [id number]      History of present illness  84 y.o. male  on day  4 of admission presenting with Acute myocardial infarction, unspecified MI type, unspecified artery (Multi).  From a nursing home with known history of CKD/anemia/GERD/dementia/anxiety/hypertension who presented to the ED with progressive hypertension blood systolic blood pressure of 190, increased oxygen demand as he required increased  from 2 L baseline to currently at 6 to 8 L was noticed by family member during [place] he is to have increased work of breathing working out of ordinary, labs were pertinent for testing positive for COVID, chest x-ray showed bilateral infiltrates patient in the right lower lobe, ABG showed hypoxia without significant CO2 retention, RSV/COVID-negative, hemoglobin was low at 8.3 with bicarb mildly elevated WBC elevation at 20 also elevated both BNP and troponin  Past Medical/Surgical History:    has a past medical history of Personal history of other diseases of the respiratory system (09/28/2016), Personal history of other endocrine, nutritional and metabolic disease (01/13/2017), Unspecified asthma with (acute) exacerbation ([facility]) (08/15/2018), and Vitamin D deficiency, unspecified (02/11/2022).   has no past surgical history on file.   reports that he has never smoked. He has never used smokeless tobacco. He reports that he does not currently use alcohol. He reports that he does not use drugs.  Family History:   No relevant family history has been documented for this patient.     Allergies:     Grass pollen and Penicillins        Social history:   reports that he has never smoked. He has never used smokeless tobacco. He reports that he does not currently use alcohol. He reports that he does not use drugs.        Review of Systems:   General: denies weight gain, denies loss of appetite, fever, chills, night sweats.  HEENT: denies headaches, dizziness, head trauma, visual changes, eye pain, tinnitus, nosebleeds, hoarseness or throat pain    Respiratory: denies chest pain, dyspnea, cough and hemoptysis  Cardiovascular: denies orthopnea, paroxysmal nocturnal dyspnea, leg swelling, and previous heart attack.    Gastrointestinal: denies pain, nausea vomiting, diarrhea, constipation, melena or bleeding.  Genitourinary: denies hematuria, frequency, urgency or dysuria  Neurology: denies syncope, seizures, paralysis,  paraesthesia   Endocrine: denies polyuria, polydipsia, skin or hair changes, and heat or cold intolerance  Musculoskeletal: denies joint pain, swelling, arthritis or myalgia  Hematologic: denies bleeding, adenopathy and easy bruising  Skin: denies rashes and skin discoloration  Psychiatry: denies depression     Physical Exam:   Vital Signs:       Vitals:     12/31/24 0826   BP: 104/57   Pulse: 91   Resp: 20   Temp: 36.3 °C (97.3 °F)   SpO2: 94%      Vitals       Vitals:     12/26/24 2327   Weight: 60 kg (132 lb 4.4 oz)                               1/1/25   General appearance: Not in pain or distress, in no respiratory distress    HEENT: Atraumatic/normocephalic, EOMI, BRITTANY, pharynx clear, moist mucosa, redness of the uvula appreciated,   Neck: Supple, no jugular venous distension, lymphadenopathy, thyromegaly or carotid bruits  Chest: Rhonchi  Cardiovascular: Normal S1, S2, regular rate and rhythm, no murmur, rub or gallop  Abdomen: Normal sounds present, soft, lax with no tenderness, no hepatosplenomegaly, and no masses  Extremities: No edema. Pulses are equally present.   Skin: intact, no rashes   Neurologic: Alert and oriented x 3, No focal deficit           Current Medications:   Scheduled medications  Reviewed     Continuous medications  Continuous Medications            PRN medications  PRN Medications   PRN medications: acetaminophen, albuterol, melatonin, ondansetron           Investigations:  Labs, radiological imaging and cardiac work up were personally reviewed     XR chest 1 view     Result Date: 12/27/2024  STUDY: Chest Radiograph;  12/26/2024 11:54 PM INDICATION: Shortness of breath. COMPARISON: 10/16/2024 XR Chest ACCESSION NUMBER(S): [id number] ORDERING CLINICIAN: ZAINA MCDANIEL TECHNIQUE:  Frontal chest was obtained at 23:51 hours. FINDINGS: CARDIOMEDIASTINAL SILHOUETTE: Cardiomediastinal silhouette is stable in size and configuration.  LUNGS: Right basilar airspace disease/consolidation is  demonstrated.  Left lung is grossly clear.  No overt edema.  No large pleural effusion. No pneumothorax.  ABDOMEN: No remarkable upper abdominal findings.  BONES: No acute osseous changes.     Right basilar airspace disease/consolidation most compatible with pneumonia. Signed by Alberto Yanez MD     Esophagogastroduodenoscopy (EGD)     Result Date: 12/13/2024  Table formatting from the original result was not included. Impression 3 cm hiatal hernia The upper third of the esophagus and middle third of the esophagus appeared normal. Two tongues of Gary-colored mucosa (~1 cm ) suspicion for Amanda's esophagus; performed cold forceps biopsy The cardia, fundus of the stomach, body of the stomach, incisura, antrum, prepyloric region and pylorus appeared normal. The duodenal bulb, 1st part of the duodenum and 2nd part of the duodenum appeared normal. Findings 3 cm hiatal hernia without Javier lesions present - GE junction 35 cm from the incisors, diaphragmatic impression 38 cm from the incisors, confirmed by retroflexion. Hill grade I hiatal hernia The upper third of the esophagus and middle third of the esophagus appeared normal. Amanda's esophagus observed with 2 tongues and no associated lesion without using a distal attachment cap. The Z-line was 35 cm from the incisors; performed cold forceps biopsy The cardia, fundus of the stomach, body of the stomach, incisura, antrum, prepyloric region and pylorus appeared normal. The duodenal bulb, 1st part of the duodenum and 2nd part of the duodenum appeared normal. Recommendation Await pathology results Follow up with PCP Protonix 40 mg once daily  Indication Esophagitis Staff Staff Role Manan Pollock MD Proceduralist Medications See Anesthesia Record. Preprocedure A history and physical has been performed, and patient medication allergies have been reviewed. The patient's tolerance of previous anesthesia has been reviewed. The risks and benefits of the procedure and  the sedation options and risks were discussed with the patient. All questions were answered and informed consent obtained. Details of the Procedure The patient underwent monitored anesthesia care, which was administered by an anesthesia professional. The patient's blood pressure, ECG, ETCO2, heart rate, level of consciousness, oxygen and respirations were monitored throughout the procedure. The scope was introduced through the mouth and advanced to the second part of the duodenum. Retroflexion was performed in the cardia. The patient experienced no blood loss. The procedure was not difficult. The patient tolerated the procedure well. There were no apparent adverse events. Events Procedure Events Event Event Time ENDO SCOPE IN TIME 12/13/2024  2:28 PM ENDO SCOPE OUT TIME 12/13/2024  2:32 PM Specimens ID Type Source Tests Collected by Time 1 : COLD BX Tissue ESOPHAGOGASTRIC JUNCTION BIOPSY SURGICAL PATHOLOGY EXAM Manan Pollock MD 12/13/2024 1431 Procedure Location [facility] [address] [facility] [address] [phone] Referring Provider Kylie Danielle PA-C Procedure Provider Manan Pollock MD        Assessment  Rasheed Antonio IS 84 y.o. male  on day  4 of admission presenting with Acute myocardial infarction, unspecified MI type, unspecified artery (Multi). Actively being treated for the  following medical Problems:      84 y.o. male  on day  0 of admission presenting with Acute myocardial infarction, unspecified MI type, unspecified artery (Multi).  From a nursing home with known history of CKD/anemia/GERD/dementia/anxiety/hypertension who presented to the ED with progressive hypertension blood systolic blood pressure of 190, increased oxygen demand as he required increased from 2 L baseline to currently at 6 to 8 L was noticed by family member during [place] he is to have increased work of breathing working out of ordinary, labs were pertinent for testing positive for  COVID, chest x-ray showed bilateral infiltrates patient in the right lower lobe, ABG showed hypoxia without significant CO2 retention, RSV/COVID-negative, hemoglobin was low at 8.3 with bicarb mildly elevated WBC elevation at 20 also elevated both BNP and troponin  Acute on chronic hypoxia  Right lower lobe infiltrate with elevated white count  COVID-19 positive  Concern of non-ST elevation MI based on elevated troponin  Accelerated hypertension  Lactic acidosis likely multifactorial    Chronic Conditions   # CKD  # Anemia  # GERD  # Dementia  # Anxiety    Plan as per orders open above for all above diagnosis                Nehemiah Traore MD

## 2025-01-01 NOTE — PROGRESS NOTES
Rasheed Antonio is a 84 y.o. male on day 5 of admission presenting with Acute myocardial infarction, unspecified MI type, unspecified artery (Multi).      Subjective   ***       Objective     Last Recorded Vitals  /61 (BP Location: Right arm, Patient Position: Lying)   Pulse 74   Temp 35.3 °C (95.5 °F) (Temporal)   Resp 18   Wt 60 kg (132 lb 4.4 oz)   SpO2 97%   Intake/Output last 3 Shifts:    Intake/Output Summary (Last 24 hours) at 1/1/2025 1448  Last data filed at 1/1/2025 0900  Gross per 24 hour   Intake --   Output 2650 ml   Net -2650 ml       Admission Weight  Weight: 60 kg (132 lb 4.4 oz) (12/26/24 1457)    Daily Weight  12/26/24 : 60 kg (132 lb 4.4 oz)    Image Results  XR chest 1 view  Narrative: Interpreted By:  Courtney Bartholomew,   STUDY:  XR CHEST 1 VIEW;  12/28/2024 6:10 am      INDICATION:  Signs/Symptoms:worsening shortness of breath.      COMPARISON:  12/27/2024      ACCESSION NUMBER(S):  [id number]      ORDERING CLINICIAN:  GELA FLOREZ      FINDINGS:  The patient is rotated. The heart size may be normal. There are  atherosclerotic changes of the aorta.      There is bilateral parenchymal infiltration.      There are degenerative changes of the spine.      COMPARISON OF FINDING:  Lungs appear worse.      Impression: Worsening parenchymal infiltration.      MACRO:  none      Signed by: Courtney Bartholomew 12/28/2024 7:11 AM  Dictation workstation:   [id number]    84 y.o. male  on day  4 of admission presenting with Acute myocardial infarction, unspecified MI type, unspecified artery (Multi).  From a nursing home with known history of CKD/anemia/GERD/dementia/anxiety/hypertension who presented to the ED with progressive hypertension blood systolic blood pressure of 190, increased oxygen demand as he required increased from 2 L baseline to currently at 6 to 8 L was noticed by family member during [place] he is to have increased work of breathing working out of ordinary, labs were pertinent for  testing positive for COVID, chest x-ray showed bilateral infiltrates patient in the right lower lobe, ABG showed hypoxia without significant CO2 retention, RSV/COVID-negative, hemoglobin was low at 8.3 with bicarb mildly elevated WBC elevation at 20 also elevated both BNP and troponin  Past Medical/Surgical History:    has a past medical history of Personal history of other diseases of the respiratory system (09/28/2016), Personal history of other endocrine, nutritional and metabolic disease (01/13/2017), Unspecified asthma with (acute) exacerbation ([facility]) (08/15/2018), and Vitamin D deficiency, unspecified (02/11/2022).   has no past surgical history on file.   reports that he has never smoked. He has never used smokeless tobacco. He reports that he does not currently use alcohol. He reports that he does not use drugs.  Family History:   No relevant family history has been documented for this patient.     Allergies:     Grass pollen and Penicillins        Social history:   reports that he has never smoked. He has never used smokeless tobacco. He reports that he does not currently use alcohol. He reports that he does not use drugs.        Review of Systems:   General: denies weight gain, denies loss of appetite, fever, chills, night sweats.  HEENT: denies headaches, dizziness, head trauma, visual changes, eye pain, tinnitus, nosebleeds, hoarseness or throat pain    Respiratory: denies chest pain, dyspnea, cough and hemoptysis  Cardiovascular: denies orthopnea, paroxysmal nocturnal dyspnea, leg swelling, and previous heart attack.    Gastrointestinal: denies pain, nausea vomiting, diarrhea, constipation, melena or bleeding.  Genitourinary: denies hematuria, frequency, urgency or dysuria  Neurology: denies syncope, seizures, paralysis, paraesthesia   Endocrine: denies polyuria, polydipsia, skin or hair changes, and heat or cold intolerance  Musculoskeletal: denies joint pain, swelling, arthritis or  myalgia  Hematologic: denies bleeding, adenopathy and easy bruising  Skin: denies rashes and skin discoloration  Psychiatry: denies depression     Physical Exam:   Vital Signs:       Vitals:     12/31/24 0826   BP: 104/57   Pulse: 91   Resp: 20   Temp: 36.3 °C (97.3 °F)   SpO2: 94%      Vitals       Vitals:     12/26/24 2327   Weight: 60 kg (132 lb 4.4 oz)               Input/Output:     Intake/Output Summary (Last 24 hours) at 12/31/2024 1108  Last data filed at 12/31/2024 0310      Gross per 24 hour   Intake 1060 ml   Output 2800 ml   Net -1740 ml         Oxygen requirements:     Ventilator Information:  FiO2 (%):  [21 %-28 %] 21 %  S RR:  [12] 12  Oxygen Therapy/Pulse Ox  Medical Gas Therapy: None (Room air)  Medical Gas Delivery Method: Nasal cannula  FiO2 (%): 21 %  SpO2: 94 %  Patient Activity During SpO2 Measurement: At rest  Temp: 36.3 °C (97.3 °F)           General appearance: Not in pain or distress, in no respiratory distress    HEENT: Atraumatic/normocephalic, EOMI, BRITTANY, pharynx clear, moist mucosa, redness of the uvula appreciated,   Neck: Supple, no jugular venous distension, lymphadenopathy, thyromegaly or carotid bruits  Chest: Rhonchi  Cardiovascular: Normal S1, S2, regular rate and rhythm, no murmur, rub or gallop  Abdomen: Normal sounds present, soft, lax with no tenderness, no hepatosplenomegaly, and no masses  Extremities: No edema. Pulses are equally present.   Skin: intact, no rashes   Neurologic: Alert and oriented x 3, No focal deficit           Current Medications:   Scheduled medications    Scheduled Medications   amLODIPine, 5 mg, oral, Daily  aspirin, 81 mg, oral, Daily  atorvastatin, 20 mg, oral, Nightly  azithromycin, 500 mg, intravenous, q24h  cefepime, 1 g, intravenous, q12h  dexAMETHasone, 6 mg, intravenous, Daily  dorzolamide, 1 drop, Both Eyes, TID  furosemide, 40 mg, intravenous, Daily  guaiFENesin, 600 mg, oral, BID  ipratropium-albuteroL, 3 mL, nebulization, TID  latanoprost,  1 drop, Both Eyes, Nightly  oxygen, , inhalation, Continuous - Inhalation  pantoprazole, 40 mg, oral, Daily before breakfast  pneumococcal polysaccharide, 0.5 mL, intramuscular, During hospitalization  polyethylene glycol, 17 g, oral, q48h         Continuous medications  Continuous Medications            PRN medications  PRN Medications   PRN medications: acetaminophen, albuterol, melatonin, ondansetron           Investigations:  Labs, radiological imaging and cardiac work up were personally reviewed            Results from last 7 days   Lab Units 12/30/24  0558 12/29/24  0544 12/28/24  0532   SODIUM mmol/L 138 137 138   POTASSIUM mmol/L 3.9 3.8 4.0   CHLORIDE mmol/L 105 106 108*   CO2 mmol/L 22 18* 17*   BUN mg/dL 56* 52* 42*   CREATININE mg/dL 1.95* 1.81* 1.89*   GLUCOSE mg/dL 117* 109* 155*   CALCIUM mg/dL 8.5* 8.2* 8.6           Results from last 7 days   Lab Units 12/30/24  0558   WBC AUTO x10*3/uL 15.1*   HEMOGLOBIN g/dL 7.3*   HEMATOCRIT % 24.2*   PLATELETS AUTO x10*3/uL 317          XR chest 1 view     Result Date: 12/27/2024  STUDY: Chest Radiograph;  12/26/2024 11:54 PM INDICATION: Shortness of breath. COMPARISON: 10/16/2024 XR Chest ACCESSION NUMBER(S): [id number] ORDERING CLINICIAN: ZAINA MCDANIEL TECHNIQUE:  Frontal chest was obtained at 23:51 hours. FINDINGS: CARDIOMEDIASTINAL SILHOUETTE: Cardiomediastinal silhouette is stable in size and configuration.  LUNGS: Right basilar airspace disease/consolidation is demonstrated.  Left lung is grossly clear.  No overt edema.  No large pleural effusion. No pneumothorax.  ABDOMEN: No remarkable upper abdominal findings.  BONES: No acute osseous changes.     Right basilar airspace disease/consolidation most compatible with pneumonia. Signed by Alberto Yanez MD     Esophagogastroduodenoscopy (EGD)     Result Date: 12/13/2024  Table formatting from the original result was not included. Impression 3 cm hiatal hernia The upper third of the esophagus and middle  third of the esophagus appeared normal. Two tongues of Cam[place]-colored mucosa (~1 cm ) suspicion for Amanda's esophagus; performed cold forceps biopsy The cardia, fundus of the stomach, body of the stomach, incisura, antrum, prepyloric region and pylorus appeared normal. The duodenal bulb, 1st part of the duodenum and 2nd part of the duodenum appeared normal. Findings 3 cm hiatal hernia without Javier lesions present - GE junction 35 cm from the incisors, diaphragmatic impression 38 cm from the incisors, confirmed by retroflexion. Hill grade I hiatal hernia The upper third of the esophagus and middle third of the esophagus appeared normal. Amanda's esophagus observed with 2 tongues and no associated lesion without using a distal attachment cap. The Z-line was 35 cm from the incisors; performed cold forceps biopsy The cardia, fundus of the stomach, body of the stomach, incisura, antrum, prepyloric region and pylorus appeared normal. The duodenal bulb, 1st part of the duodenum and 2nd part of the duodenum appeared normal. Recommendation Await pathology results Follow up with PCP Protonix 40 mg once daily  Indication Esophagitis Staff Staff Role Manan Pollock MD Proceduralist Medications See Anesthesia Record. Preprocedure A history and physical has been performed, and patient medication allergies have been reviewed. The patient's tolerance of previous anesthesia has been reviewed. The risks and benefits of the procedure and the sedation options and risks were discussed with the patient. All questions were answered and informed consent obtained. Details of the Procedure The patient underwent monitored anesthesia care, which was administered by an anesthesia professional. The patient's blood pressure, ECG, ETCO2, heart rate, level of consciousness, oxygen and respirations were monitored throughout the procedure. The scope was introduced through the mouth and advanced to the second part of the duodenum. Retroflexion  was performed in the cardia. The patient experienced no blood loss. The procedure was not difficult. The patient tolerated the procedure well. There were no apparent adverse events. Events Procedure Events Event Event Time ENDO SCOPE IN TIME 12/13/2024  2:28 PM ENDO SCOPE OUT TIME 12/13/2024  2:32 PM Specimens ID Type Source Tests Collected by Time 1 : COLD BX Tissue ESOPHAGOGASTRIC JUNCTION BIOPSY SURGICAL PATHOLOGY EXAM Manan Pollock MD 12/13/2024 1431 Procedure Location [facility] [address] [facility] [address] [phone] Referring Provider Kylie Danielle PA-C Procedure Provider Manan Pollock MD        Assessment  Rasheed Antonio IS 84 y.o. male  on day  4 of admission presenting with Acute myocardial infarction, unspecified MI type, unspecified artery (Multi). Actively being treated for the  following medical Problems:      84 y.o. male  on day  0 of admission presenting with Acute myocardial infarction, unspecified MI type, unspecified artery (Multi).  From a nursing home with known history of CKD/anemia/GERD/dementia/anxiety/hypertension who presented to the ED with progressive hypertension blood systolic blood pressure of 190, increased oxygen demand as he required increased from 2 L baseline to currently at 6 to 8 L was noticed by family member during [place] he is to have increased work of breathing working out of ordinary, labs were pertinent for testing positive for COVID, chest x-ray showed bilateral infiltrates patient in the right lower lobe, ABG showed hypoxia without significant CO2 retention, RSV/COVID-negative, hemoglobin was low at 8.3 with bicarb mildly elevated WBC elevation at 20 also elevated both BNP and troponin  Acute on chronic hypoxia  Right lower lobe infiltrate with elevated white count  COVID-19 positive  Concern of non-ST elevation MI based on elevated troponin  Accelerated hypertension  Lactic acidosis likely multifactorial                Nehemiah Traore MD

## 2025-01-01 NOTE — CARE PLAN
The patient's goals for the shift include      The clinical goals for the shift include safety and comfort      Problem: Skin  Goal: Decreased wound size/increased tissue granulation at next dressing change  Outcome: Progressing  Goal: Participates in plan/prevention/treatment measures  Outcome: Progressing  Goal: Prevent/manage excess moisture  Outcome: Progressing  Goal: Prevent/minimize sheer/friction injuries  Outcome: Progressing  Goal: Promote/optimize nutrition  Outcome: Progressing  Flowsheets (Taken 1/1/2025 0144)  Promote/optimize nutrition: Monitor/record intake including meals  Goal: Promote skin healing  Outcome: Progressing     Problem: Fall/Injury  Goal: Not fall by end of shift  Outcome: Progressing  Goal: Be free from injury by end of the shift  Outcome: Progressing  Goal: Verbalize understanding of personal risk factors for fall in the hospital  Outcome: Progressing  Goal: Verbalize understanding of risk factor reduction measures to prevent injury from fall in the home  Outcome: Progressing  Goal: Use assistive devices by end of the shift  Outcome: Progressing  Goal: Pace activities to prevent fatigue by end of the shift  Outcome: Progressing       Over the shift, the patient did not make progress toward the following goals. Barriers to progression include

## 2025-01-02 VITALS
BODY MASS INDEX: 22.58 KG/M2 | RESPIRATION RATE: 18 BRPM | SYSTOLIC BLOOD PRESSURE: 113 MMHG | DIASTOLIC BLOOD PRESSURE: 58 MMHG | TEMPERATURE: 98.1 F | HEART RATE: 77 BPM | OXYGEN SATURATION: 97 % | HEIGHT: 64 IN | WEIGHT: 132.28 LBS

## 2025-01-02 LAB
ALBUMIN SERPL BCP-MCNC: 3.8 G/DL (ref 3.4–5)
ALP SERPL-CCNC: 52 U/L (ref 33–136)
ALT SERPL W P-5'-P-CCNC: 12 U/L (ref 10–52)
ANION GAP SERPL CALC-SCNC: 15 MMOL/L (ref 10–20)
AST SERPL W P-5'-P-CCNC: 16 U/L (ref 9–39)
BASOPHILS # BLD AUTO: 0.02 X10*3/UL (ref 0–0.1)
BASOPHILS NFR BLD AUTO: 0.1 %
BILIRUB SERPL-MCNC: 0.7 MG/DL (ref 0–1.2)
BUN SERPL-MCNC: 62 MG/DL (ref 6–23)
CALCIUM SERPL-MCNC: 9 MG/DL (ref 8.6–10.3)
CHLORIDE SERPL-SCNC: 99 MMOL/L (ref 98–107)
CO2 SERPL-SCNC: 25 MMOL/L (ref 21–32)
CREAT SERPL-MCNC: 1.83 MG/DL (ref 0.5–1.3)
EGFRCR SERPLBLD CKD-EPI 2021: 35 ML/MIN/1.73M*2
EOSINOPHIL # BLD AUTO: 0 X10*3/UL (ref 0–0.4)
EOSINOPHIL NFR BLD AUTO: 0 %
ERYTHROCYTE [DISTWIDTH] IN BLOOD BY AUTOMATED COUNT: 16.7 % (ref 11.5–14.5)
GLUCOSE BLD MANUAL STRIP-MCNC: 110 MG/DL (ref 74–99)
GLUCOSE BLD MANUAL STRIP-MCNC: 154 MG/DL (ref 74–99)
GLUCOSE BLD MANUAL STRIP-MCNC: 90 MG/DL (ref 74–99)
GLUCOSE SERPL-MCNC: 103 MG/DL (ref 74–99)
HCT VFR BLD AUTO: 32.2 % (ref 41–52)
HGB BLD-MCNC: 9.9 G/DL (ref 13.5–17.5)
IMM GRANULOCYTES # BLD AUTO: 0.47 X10*3/UL (ref 0–0.5)
IMM GRANULOCYTES NFR BLD AUTO: 2.8 % (ref 0–0.9)
LYMPHOCYTES # BLD AUTO: 1.66 X10*3/UL (ref 0.8–3)
LYMPHOCYTES NFR BLD AUTO: 9.9 %
MCH RBC QN AUTO: 23.9 PG (ref 26–34)
MCHC RBC AUTO-ENTMCNC: 30.7 G/DL (ref 32–36)
MCV RBC AUTO: 78 FL (ref 80–100)
MONOCYTES # BLD AUTO: 1.42 X10*3/UL (ref 0.05–0.8)
MONOCYTES NFR BLD AUTO: 8.5 %
NEUTROPHILS # BLD AUTO: 13.16 X10*3/UL (ref 1.6–5.5)
NEUTROPHILS NFR BLD AUTO: 78.7 %
NRBC BLD-RTO: 0.2 /100 WBCS (ref 0–0)
PLATELET # BLD AUTO: 493 X10*3/UL (ref 150–450)
POTASSIUM SERPL-SCNC: 3.8 MMOL/L (ref 3.5–5.3)
PROT SERPL-MCNC: 7.4 G/DL (ref 6.4–8.2)
RBC # BLD AUTO: 4.14 X10*6/UL (ref 4.5–5.9)
SODIUM SERPL-SCNC: 135 MMOL/L (ref 136–145)
WBC # BLD AUTO: 16.7 X10*3/UL (ref 4.4–11.3)

## 2025-01-02 PROCEDURE — 85025 COMPLETE CBC W/AUTO DIFF WBC: CPT | Performed by: INTERNAL MEDICINE

## 2025-01-02 PROCEDURE — 2500000004 HC RX 250 GENERAL PHARMACY W/ HCPCS (ALT 636 FOR OP/ED)

## 2025-01-02 PROCEDURE — 94640 AIRWAY INHALATION TREATMENT: CPT

## 2025-01-02 PROCEDURE — 2500000001 HC RX 250 WO HCPCS SELF ADMINISTERED DRUGS (ALT 637 FOR MEDICARE OP): Performed by: PHYSICIAN ASSISTANT

## 2025-01-02 PROCEDURE — 2500000001 HC RX 250 WO HCPCS SELF ADMINISTERED DRUGS (ALT 637 FOR MEDICARE OP): Performed by: INTERNAL MEDICINE

## 2025-01-02 PROCEDURE — 36415 COLL VENOUS BLD VENIPUNCTURE: CPT | Performed by: INTERNAL MEDICINE

## 2025-01-02 PROCEDURE — 80053 COMPREHEN METABOLIC PANEL: CPT | Performed by: INTERNAL MEDICINE

## 2025-01-02 PROCEDURE — 2500000005 HC RX 250 GENERAL PHARMACY W/O HCPCS

## 2025-01-02 PROCEDURE — 82947 ASSAY GLUCOSE BLOOD QUANT: CPT

## 2025-01-02 PROCEDURE — 99239 HOSP IP/OBS DSCHRG MGMT >30: CPT | Performed by: STUDENT IN AN ORGANIZED HEALTH CARE EDUCATION/TRAINING PROGRAM

## 2025-01-02 PROCEDURE — 2500000002 HC RX 250 W HCPCS SELF ADMINISTERED DRUGS (ALT 637 FOR MEDICARE OP, ALT 636 FOR OP/ED): Performed by: STUDENT IN AN ORGANIZED HEALTH CARE EDUCATION/TRAINING PROGRAM

## 2025-01-02 PROCEDURE — 2500000004 HC RX 250 GENERAL PHARMACY W/ HCPCS (ALT 636 FOR OP/ED): Performed by: INTERNAL MEDICINE

## 2025-01-02 RX ORDER — ATORVASTATIN CALCIUM 20 MG/1
20 TABLET, FILM COATED ORAL NIGHTLY
Qty: 30 TABLET | Refills: 0 | Status: SHIPPED | OUTPATIENT
Start: 2025-01-02

## 2025-01-02 RX ORDER — NAPROXEN SODIUM 220 MG/1
81 TABLET, FILM COATED ORAL DAILY
Qty: 30 TABLET | Refills: 0 | Status: SHIPPED | OUTPATIENT
Start: 2025-01-03

## 2025-01-02 RX ADMIN — DORZOLAMIDE HCL 1 DROP: 20 SOLUTION/ DROPS OPHTHALMIC at 08:44

## 2025-01-02 RX ADMIN — Medication 21 PERCENT: at 07:00

## 2025-01-02 RX ADMIN — DEXAMETHASONE SODIUM PHOSPHATE 6 MG: 10 INJECTION INTRAMUSCULAR; INTRAVENOUS at 08:43

## 2025-01-02 RX ADMIN — FUROSEMIDE 40 MG: 10 INJECTION, SOLUTION INTRAMUSCULAR; INTRAVENOUS at 08:43

## 2025-01-02 RX ADMIN — AMLODIPINE BESYLATE 5 MG: 5 TABLET ORAL at 08:42

## 2025-01-02 RX ADMIN — IPRATROPIUM BROMIDE AND ALBUTEROL SULFATE 3 ML: .5; 3 SOLUTION RESPIRATORY (INHALATION) at 19:05

## 2025-01-02 RX ADMIN — ASPIRIN 81 MG CHEWABLE TABLET 81 MG: 81 TABLET CHEWABLE at 08:42

## 2025-01-02 RX ADMIN — IPRATROPIUM BROMIDE AND ALBUTEROL SULFATE 3 ML: .5; 3 SOLUTION RESPIRATORY (INHALATION) at 07:00

## 2025-01-02 RX ADMIN — Medication 21 PERCENT: at 07:02

## 2025-01-02 RX ADMIN — IPRATROPIUM BROMIDE AND ALBUTEROL SULFATE 3 ML: .5; 3 SOLUTION RESPIRATORY (INHALATION) at 13:15

## 2025-01-02 ASSESSMENT — COGNITIVE AND FUNCTIONAL STATUS - GENERAL
DAILY ACTIVITIY SCORE: 19
STANDING UP FROM CHAIR USING ARMS: A LOT
MOVING TO AND FROM BED TO CHAIR: A LOT
WALKING IN HOSPITAL ROOM: TOTAL
DRESSING REGULAR UPPER BODY CLOTHING: A LITTLE
DRESSING REGULAR LOWER BODY CLOTHING: A LITTLE
CLIMB 3 TO 5 STEPS WITH RAILING: TOTAL
MOBILITY SCORE: 14
HELP NEEDED FOR BATHING: A LITTLE
PERSONAL GROOMING: A LITTLE
TOILETING: A LITTLE

## 2025-01-02 ASSESSMENT — PAIN SCALES - GENERAL: PAINLEVEL_OUTOF10: 0 - NO PAIN

## 2025-01-02 NOTE — CARE PLAN
The patient's goals for the shift include      The clinical goals for the shift include patient will maintain safety and comfort and refrain from further complications      Problem: Skin  Goal: Decreased wound size/increased tissue granulation at next dressing change  Outcome: Progressing  Goal: Participates in plan/prevention/treatment measures  Outcome: Progressing  Goal: Prevent/manage excess moisture  Outcome: Progressing  Goal: Prevent/minimize sheer/friction injuries  Outcome: Progressing  Goal: Promote/optimize nutrition  Outcome: Progressing  Flowsheets (Taken 1/2/2025 0015)  Promote/optimize nutrition: Monitor/record intake including meals  Goal: Promote skin healing  Outcome: Progressing     Problem: Fall/Injury  Goal: Not fall by end of shift  Outcome: Progressing  Goal: Be free from injury by end of the shift  Outcome: Progressing  Goal: Verbalize understanding of personal risk factors for fall in the hospital  Outcome: Progressing  Goal: Verbalize understanding of risk factor reduction measures to prevent injury from fall in the home  Outcome: Progressing  Goal: Use assistive devices by end of the shift  Outcome: Progressing  Goal: Pace activities to prevent fatigue by end of the shift  Outcome: Progressing       Over the shift, the patient did not make progress toward the following goals. Barriers to progression include

## 2025-01-02 NOTE — PROGRESS NOTES
Yesenia spoke with pts POA to see if she would like to continue [facility] at the [facility]. Pt's POA stated that they are going to continue with hospice.  Sw sent over clinicals to [facility] as requested.  They know that pt is for DC today and they will follow pt at the [facility]. Yesenia notified pts POA that pt will DC this afternoon.  Pt will need transport arranged to get back to the [facility].  Yesenia answered all dc related questions. Yesenia will continue to provide support and services as needed to ensure a safe dc plan is in place.

## 2025-01-03 NOTE — DISCHARGE SUMMARY
Discharge Diagnosis  Acute myocardial infarction, unspecified MI type, unspecified artery (Multi)    Issues Requiring Follow-Up  mi    Test Results Pending At Discharge  Pending Labs       No current pending labs.            Hospital Course   Patient arrived to ED today after being picked up from assisted living facility for shortness of breath and oxygen saturations at 71% with patient's baseline 2 L nasal cannula increased to 5 L.  Patient placed on CPAP and route by EMS.  Patient was also found to be hypertensive 190/130, EMS gave 1 nitro and patient became hypotensive but patient reports feeling better.  In ED EKG was obtained which showed ischemic changes and possible MI, troponins are reflecting that with initial troponin 464 and second troponin 1453.  Per ED physician spoke with daughter-in-law who is power of  and wants patient to remain DNR CC arrest DNI.  Family did not want cardiac catheterization so patient is being treated with a continuous heparin drip.  Cardiology consulted appreciate recs.  Patient also found to be COVID-positive, will treat with COVID protocol.  Chest x-ray pending.  Pulmonology consult placed appreciate recs.  Patient also found to have electrolyte imbalance, electrolytes repleted, will trend with morning labs.     Patient admitted to Dr. Nick Barker for further medical management.      # Myocardial infarction?  # EKG changes  # Hypertensive PTA then Hypotensive  # Pulmonary Vascular Congestion  # Elevated BNP  # Elevated Troponin   # Hypokalemia  # Hypomagnesemia  -Continuous Heparin Drip, with heparin assays  -Cardiology consult  -Trend troponin x 1 more, initial Trope 464, 2nd trop 1453  -Replete magnesium and potassium  -N.p.o. diet while on BiPAP, may advance diet when appropriate to cardiac  -admitted to inpatient with telemetry    -q4 vitals   -morning labs, trend electrolytes, troponin, repeat VBG     # Sepsis   # COVID-positive  # Leukocytosis  # Lactic acidosis  #  Abnormal VBG  -DuoNeb scheduled, albuterol as needed  -0.9 normal saline at 75 mL/hour until BiPAP is removed  -Initiate remdesivir  -Continue BiPAP  -Dexamethasone  -Antibiotics initiated in ED will continue cefepime, azithromycin  -Blood cultures in process  -Pulmonology consult  -Droplet plus precautions  -Strep pneumo/Legionella urine ordered/UA  -Respiratory culture ordered     Chronic Conditions   # CKD  # Anemia  # GERD  # Dementia  # Anxiety     Continue home medications as ordered when nursing completes home med rec.    PT/OT/SW   DNR cc Arrest-DNI     #DVT Prophylaxis   Scd's as tolerated   DVT Prophylaxis on continuous heparin        12/27: doing better with conservative management, palliative consutled, heparin drip, echo pending    Pertinent Physical Exam At Time of Discharge  Physical Exam  Constitutional:       Appearance: Normal appearance.   HENT:      Head: Normocephalic and atraumatic.      Right Ear: Tympanic membrane and ear canal normal.      Left Ear: Tympanic membrane and ear canal normal.      Mouth/Throat:      Mouth: Mucous membranes are moist.      Pharynx: Oropharynx is clear.   Eyes:      Extraocular Movements: Extraocular movements intact.      Conjunctiva/sclera: Conjunctivae normal.      Pupils: Pupils are equal, round, and reactive to light.   Cardiovascular:      Rate and Rhythm: Normal rate and regular rhythm.      Pulses: Normal pulses.      Heart sounds: Normal heart sounds.   Pulmonary:      Effort: Pulmonary effort is normal.      Breath sounds: Normal breath sounds.   Abdominal:      General: Abdomen is flat. Bowel sounds are normal.      Palpations: Abdomen is soft.   Musculoskeletal:         General: Normal range of motion.      Cervical back: Normal range of motion and neck supple.   Skin:     General: Skin is warm and dry.      Capillary Refill: Capillary refill takes 2 to 3 seconds.   Neurological:      General: No focal deficit present.      Mental Status: He is alert  and oriented to person, place, and time. Mental status is at baseline.   Psychiatric:         Mood and Affect: Mood normal.         Behavior: Behavior normal.         Thought Content: Thought content normal.         Judgment: Judgment normal.         Home Medications     Medication List      START taking these medications     aspirin 81 mg chewable tablet; Chew 1 tablet (81 mg) once daily.   atorvastatin 20 mg tablet; Commonly known as: Lipitor; Take 1 tablet (20   mg) by mouth once daily at bedtime.     CONTINUE taking these medications     acetaminophen 325 mg tablet; Commonly known as: Tylenol   albuterol 2.5 mg /3 mL (0.083 %) nebulizer solution; Take 3 mL by   nebulization every 4 hours if needed for wheezing.   amLODIPine 10 mg tablet; Commonly known as: Norvasc; Take 1 tablet (10   mg) by mouth once daily.   dorzolamide 2 % ophthalmic solution; Commonly known as: Trusopt   latanoprost 0.005 % ophthalmic solution; Commonly known as: Xalatan   oxygen gas therapy; Commonly known as: O2; Inhale 1 each once every 24   hours.   pantoprazole 40 mg EC tablet; Commonly known as: ProtoNix; Take 1 tablet   (40 mg) by mouth once daily in the morning. Take before meals. Do not   crush, chew, or split.   QUEtiapine 25 mg tablet; Commonly known as: SEROquel     STOP taking these medications     dexAMETHasone 6 mg tablet; Commonly known as: Decadron       Outpatient Follow-Up  No future appointments.    Nick Barker, DO

## 2025-01-15 NOTE — DOCUMENTATION CLARIFICATION NOTE
"    PATIENT:               ILAN BUNDY  ACCT #:                  [id number]  MRN:                       [id number]  :                       1940  ADMIT DATE:       2024 11:23 PM  DISCH DATE:        2025 8:00 PM  RESPONDING PROVIDER #:        [id number]          PROVIDER RESPONSE TEXT:    Sepsis due to COVID 19 with multi organ dysfunction of respiratory with acute hypoxic respiratory failure, circulatory with low blood pressure, endocrine with hyperglycemia in the absence of DM, and cardiac with NSTEMI type II and acute systolic heart failure    CDI QUERY TEXT:    Clarification        Instruction:    Based on your assessment of the patient and the clinical information, please provide the requested documentation by clicking on the appropriate radio button and enter any additional information if prompted.    Question: Please further clarify if a relationship exists between the Sepsis and acute organ dysfunction    When answering this query, please exercise your independent professional judgment. The fact that a question is being asked, does not imply that any particular answer is desired or expected.    The patient's clinical indicators include:  Clinical Information:  84-year-old male who presents to the ED with shortness of breath from assisted living. Patient has a past medical history of CKD, CHF, anemia, GERD, dementia, anxiety, and HTN. Pt Covid positive.    Clinical Indicators:  Labs:  24 glucose:  194  24 glucose:  168  24 glucose:  155    24 Cardiology:  \"NSTEMI: Continue heparin for an additional day\"  ?HFrEF: Received IV fluids for hypotension with worsening respiratory status.  Patient is also COVID-positive?      25  Discharge Summary:  ?Myocardial infarction  EKG changes  Hypertensive PTA then Hypotensive  Pulmonary Vascular Congestion  Elevated BNP  Elevated Troponin?    ?Sepsis  COVID-positive?      Treatment:  IV Cefepime, IV Azithromycin, IV fluids, IV Lasix, " X-ray    Risk Factors:  Sepsis, Covid, Heart Failure, Respiratory Failure  Options provided:  -- Sepsis due to COVID 19 with multi organ dysfunction of respiratory with acute hypoxic respiratory failure, circulatory with low blood pressure, endocrine with hyperglycemia in the absence of DM, and cardiac with NSTEMI and acute systolic heart failure  -- Sepsis due to COVID 19 with multi organ dysfunction of respiratory with acute hypoxic respiratory failure, circulatory with low blood pressure, endocrine with hyperglycemia in the absence of DM, and cardiac with NSTEMI type II and acute systolic heart failure  -- Sepsis due to COVID 19 with circulatory organ dysfunction of low blood pressure  -- Sepsis due to COVID 19 with endocrine organ dysfunction of hyperglycemia without mention of diabetes  -- Sepsis due to COVID 19 with cardiac organ dysfunction of NSTEMI and acute systolic heart failure  -- Sepsis due to COVID 19 with cardiac organ dysfunction of NSTEMI type II and acute systolic heart failure  -- Sepsis due to COVID 19 with respiratory organ dysfunction with acute hypoxic respiratory failure  -- Other - I will add my own diagnosis  -- Refer to Clinical Documentation Reviewer    Query created by: Antoinette Bell on 1/15/2025 10:29 AM      Electronically signed by:  MARLENY SALVADOR DO 1/15/2025 11:01 AM